# Patient Record
Sex: FEMALE | Race: ASIAN | NOT HISPANIC OR LATINO
[De-identification: names, ages, dates, MRNs, and addresses within clinical notes are randomized per-mention and may not be internally consistent; named-entity substitution may affect disease eponyms.]

---

## 2023-09-14 PROBLEM — Z00.00 ENCOUNTER FOR PREVENTIVE HEALTH EXAMINATION: Status: ACTIVE | Noted: 2023-09-14

## 2023-09-15 ENCOUNTER — APPOINTMENT (OUTPATIENT)
Dept: ORTHOPEDIC SURGERY | Facility: CLINIC | Age: 73
End: 2023-09-15
Payer: MEDICARE

## 2023-09-15 ENCOUNTER — OUTPATIENT (OUTPATIENT)
Dept: OUTPATIENT SERVICES | Facility: HOSPITAL | Age: 73
LOS: 1 days | End: 2023-09-15
Payer: MEDICARE

## 2023-09-15 ENCOUNTER — RESULT REVIEW (OUTPATIENT)
Age: 73
End: 2023-09-15

## 2023-09-15 VITALS
WEIGHT: 125 LBS | SYSTOLIC BLOOD PRESSURE: 126 MMHG | OXYGEN SATURATION: 98 % | BODY MASS INDEX: 23.6 KG/M2 | HEART RATE: 70 BPM | HEIGHT: 61 IN | DIASTOLIC BLOOD PRESSURE: 79 MMHG

## 2023-09-15 DIAGNOSIS — Z78.9 OTHER SPECIFIED HEALTH STATUS: ICD-10-CM

## 2023-09-15 PROCEDURE — 99205 OFFICE O/P NEW HI 60 MIN: CPT

## 2023-09-15 PROCEDURE — 73564 X-RAY EXAM KNEE 4 OR MORE: CPT

## 2023-09-15 PROCEDURE — 73564 X-RAY EXAM KNEE 4 OR MORE: CPT | Mod: 26,50

## 2023-09-19 PROBLEM — Z78.9 NON-SMOKER: Status: ACTIVE | Noted: 2023-09-15

## 2023-12-14 ENCOUNTER — OUTPATIENT (OUTPATIENT)
Dept: OUTPATIENT SERVICES | Facility: HOSPITAL | Age: 73
LOS: 1 days | End: 2023-12-14
Payer: MEDICARE

## 2023-12-14 ENCOUNTER — APPOINTMENT (OUTPATIENT)
Dept: ORTHOPEDIC SURGERY | Facility: CLINIC | Age: 73
End: 2023-12-14
Payer: MEDICARE

## 2023-12-14 ENCOUNTER — RESULT REVIEW (OUTPATIENT)
Age: 73
End: 2023-12-14

## 2023-12-14 DIAGNOSIS — Z01.818 ENCOUNTER FOR OTHER PREPROCEDURAL EXAMINATION: ICD-10-CM

## 2023-12-14 DIAGNOSIS — Z56.0 UNEMPLOYMENT, UNSPECIFIED: ICD-10-CM

## 2023-12-14 DIAGNOSIS — M17.0 BILATERAL PRIMARY OSTEOARTHRITIS OF KNEE: ICD-10-CM

## 2023-12-14 LAB
A1C WITH ESTIMATED AVERAGE GLUCOSE RESULT: 5.7 % — HIGH (ref 4–5.6)
A1C WITH ESTIMATED AVERAGE GLUCOSE RESULT: 5.7 % — HIGH (ref 4–5.6)
ALBUMIN SERPL ELPH-MCNC: 4.1 G/DL — SIGNIFICANT CHANGE UP (ref 3.3–5)
ALBUMIN SERPL ELPH-MCNC: 4.1 G/DL — SIGNIFICANT CHANGE UP (ref 3.3–5)
ALP SERPL-CCNC: 68 U/L — SIGNIFICANT CHANGE UP (ref 40–120)
ALP SERPL-CCNC: 68 U/L — SIGNIFICANT CHANGE UP (ref 40–120)
ALT FLD-CCNC: 15 U/L — SIGNIFICANT CHANGE UP (ref 10–45)
ALT FLD-CCNC: 15 U/L — SIGNIFICANT CHANGE UP (ref 10–45)
ANION GAP SERPL CALC-SCNC: 10 MMOL/L — SIGNIFICANT CHANGE UP (ref 5–17)
ANION GAP SERPL CALC-SCNC: 10 MMOL/L — SIGNIFICANT CHANGE UP (ref 5–17)
APPEARANCE UR: CLEAR — SIGNIFICANT CHANGE UP
APPEARANCE UR: CLEAR — SIGNIFICANT CHANGE UP
APTT BLD: 33.8 SEC — SIGNIFICANT CHANGE UP (ref 24.5–35.6)
APTT BLD: 33.8 SEC — SIGNIFICANT CHANGE UP (ref 24.5–35.6)
AST SERPL-CCNC: 17 U/L — SIGNIFICANT CHANGE UP (ref 10–40)
AST SERPL-CCNC: 17 U/L — SIGNIFICANT CHANGE UP (ref 10–40)
BACTERIA # UR AUTO: NEGATIVE /HPF — SIGNIFICANT CHANGE UP
BACTERIA # UR AUTO: NEGATIVE /HPF — SIGNIFICANT CHANGE UP
BASOPHILS # BLD AUTO: 0.06 K/UL — SIGNIFICANT CHANGE UP (ref 0–0.2)
BASOPHILS # BLD AUTO: 0.06 K/UL — SIGNIFICANT CHANGE UP (ref 0–0.2)
BASOPHILS NFR BLD AUTO: 1 % — SIGNIFICANT CHANGE UP (ref 0–2)
BASOPHILS NFR BLD AUTO: 1 % — SIGNIFICANT CHANGE UP (ref 0–2)
BILIRUB SERPL-MCNC: 0.5 MG/DL — SIGNIFICANT CHANGE UP (ref 0.2–1.2)
BILIRUB SERPL-MCNC: 0.5 MG/DL — SIGNIFICANT CHANGE UP (ref 0.2–1.2)
BILIRUB UR-MCNC: NEGATIVE — SIGNIFICANT CHANGE UP
BILIRUB UR-MCNC: NEGATIVE — SIGNIFICANT CHANGE UP
BUN SERPL-MCNC: 10 MG/DL — SIGNIFICANT CHANGE UP (ref 7–23)
BUN SERPL-MCNC: 10 MG/DL — SIGNIFICANT CHANGE UP (ref 7–23)
CALCIUM SERPL-MCNC: 9.6 MG/DL — SIGNIFICANT CHANGE UP (ref 8.4–10.5)
CALCIUM SERPL-MCNC: 9.6 MG/DL — SIGNIFICANT CHANGE UP (ref 8.4–10.5)
CAST: 0 /LPF — SIGNIFICANT CHANGE UP (ref 0–4)
CAST: 0 /LPF — SIGNIFICANT CHANGE UP (ref 0–4)
CHLORIDE SERPL-SCNC: 107 MMOL/L — SIGNIFICANT CHANGE UP (ref 96–108)
CHLORIDE SERPL-SCNC: 107 MMOL/L — SIGNIFICANT CHANGE UP (ref 96–108)
CO2 SERPL-SCNC: 25 MMOL/L — SIGNIFICANT CHANGE UP (ref 22–31)
CO2 SERPL-SCNC: 25 MMOL/L — SIGNIFICANT CHANGE UP (ref 22–31)
COLOR SPEC: YELLOW — SIGNIFICANT CHANGE UP
COLOR SPEC: YELLOW — SIGNIFICANT CHANGE UP
CREAT SERPL-MCNC: 0.65 MG/DL — SIGNIFICANT CHANGE UP (ref 0.5–1.3)
CREAT SERPL-MCNC: 0.65 MG/DL — SIGNIFICANT CHANGE UP (ref 0.5–1.3)
DIFF PNL FLD: ABNORMAL
DIFF PNL FLD: ABNORMAL
EGFR: 93 ML/MIN/1.73M2 — SIGNIFICANT CHANGE UP
EGFR: 93 ML/MIN/1.73M2 — SIGNIFICANT CHANGE UP
EOSINOPHIL # BLD AUTO: 0.14 K/UL — SIGNIFICANT CHANGE UP (ref 0–0.5)
EOSINOPHIL # BLD AUTO: 0.14 K/UL — SIGNIFICANT CHANGE UP (ref 0–0.5)
EOSINOPHIL NFR BLD AUTO: 2.4 % — SIGNIFICANT CHANGE UP (ref 0–6)
EOSINOPHIL NFR BLD AUTO: 2.4 % — SIGNIFICANT CHANGE UP (ref 0–6)
ESTIMATED AVERAGE GLUCOSE: 117 MG/DL — HIGH (ref 68–114)
ESTIMATED AVERAGE GLUCOSE: 117 MG/DL — HIGH (ref 68–114)
GLUCOSE SERPL-MCNC: 90 MG/DL — SIGNIFICANT CHANGE UP (ref 70–99)
GLUCOSE SERPL-MCNC: 90 MG/DL — SIGNIFICANT CHANGE UP (ref 70–99)
GLUCOSE UR QL: NEGATIVE MG/DL — SIGNIFICANT CHANGE UP
GLUCOSE UR QL: NEGATIVE MG/DL — SIGNIFICANT CHANGE UP
HCT VFR BLD CALC: 43.5 % — SIGNIFICANT CHANGE UP (ref 34.5–45)
HCT VFR BLD CALC: 43.5 % — SIGNIFICANT CHANGE UP (ref 34.5–45)
HGB BLD-MCNC: 14.5 G/DL — SIGNIFICANT CHANGE UP (ref 11.5–15.5)
HGB BLD-MCNC: 14.5 G/DL — SIGNIFICANT CHANGE UP (ref 11.5–15.5)
IMM GRANULOCYTES NFR BLD AUTO: 0.3 % — SIGNIFICANT CHANGE UP (ref 0–0.9)
IMM GRANULOCYTES NFR BLD AUTO: 0.3 % — SIGNIFICANT CHANGE UP (ref 0–0.9)
INR BLD: 0.94 — SIGNIFICANT CHANGE UP (ref 0.85–1.18)
INR BLD: 0.94 — SIGNIFICANT CHANGE UP (ref 0.85–1.18)
KETONES UR-MCNC: NEGATIVE MG/DL — SIGNIFICANT CHANGE UP
KETONES UR-MCNC: NEGATIVE MG/DL — SIGNIFICANT CHANGE UP
LEUKOCYTE ESTERASE UR-ACNC: NEGATIVE — SIGNIFICANT CHANGE UP
LEUKOCYTE ESTERASE UR-ACNC: NEGATIVE — SIGNIFICANT CHANGE UP
LYMPHOCYTES # BLD AUTO: 1.56 K/UL — SIGNIFICANT CHANGE UP (ref 1–3.3)
LYMPHOCYTES # BLD AUTO: 1.56 K/UL — SIGNIFICANT CHANGE UP (ref 1–3.3)
LYMPHOCYTES # BLD AUTO: 27.2 % — SIGNIFICANT CHANGE UP (ref 13–44)
LYMPHOCYTES # BLD AUTO: 27.2 % — SIGNIFICANT CHANGE UP (ref 13–44)
MCHC RBC-ENTMCNC: 31.9 PG — SIGNIFICANT CHANGE UP (ref 27–34)
MCHC RBC-ENTMCNC: 31.9 PG — SIGNIFICANT CHANGE UP (ref 27–34)
MCHC RBC-ENTMCNC: 33.3 GM/DL — SIGNIFICANT CHANGE UP (ref 32–36)
MCHC RBC-ENTMCNC: 33.3 GM/DL — SIGNIFICANT CHANGE UP (ref 32–36)
MCV RBC AUTO: 95.8 FL — SIGNIFICANT CHANGE UP (ref 80–100)
MCV RBC AUTO: 95.8 FL — SIGNIFICANT CHANGE UP (ref 80–100)
MONOCYTES # BLD AUTO: 0.48 K/UL — SIGNIFICANT CHANGE UP (ref 0–0.9)
MONOCYTES # BLD AUTO: 0.48 K/UL — SIGNIFICANT CHANGE UP (ref 0–0.9)
MONOCYTES NFR BLD AUTO: 8.4 % — SIGNIFICANT CHANGE UP (ref 2–14)
MONOCYTES NFR BLD AUTO: 8.4 % — SIGNIFICANT CHANGE UP (ref 2–14)
MRSA PCR RESULT.: NEGATIVE — SIGNIFICANT CHANGE UP
MRSA PCR RESULT.: NEGATIVE — SIGNIFICANT CHANGE UP
MUCOUS THREADS # UR AUTO: PRESENT
MUCOUS THREADS # UR AUTO: PRESENT
NEUTROPHILS # BLD AUTO: 3.48 K/UL — SIGNIFICANT CHANGE UP (ref 1.8–7.4)
NEUTROPHILS # BLD AUTO: 3.48 K/UL — SIGNIFICANT CHANGE UP (ref 1.8–7.4)
NEUTROPHILS NFR BLD AUTO: 60.7 % — SIGNIFICANT CHANGE UP (ref 43–77)
NEUTROPHILS NFR BLD AUTO: 60.7 % — SIGNIFICANT CHANGE UP (ref 43–77)
NITRITE UR-MCNC: NEGATIVE — SIGNIFICANT CHANGE UP
NITRITE UR-MCNC: NEGATIVE — SIGNIFICANT CHANGE UP
NRBC # BLD: 0 /100 WBCS — SIGNIFICANT CHANGE UP (ref 0–0)
NRBC # BLD: 0 /100 WBCS — SIGNIFICANT CHANGE UP (ref 0–0)
PH UR: 6 — SIGNIFICANT CHANGE UP (ref 5–8)
PH UR: 6 — SIGNIFICANT CHANGE UP (ref 5–8)
PLATELET # BLD AUTO: 287 K/UL — SIGNIFICANT CHANGE UP (ref 150–400)
PLATELET # BLD AUTO: 287 K/UL — SIGNIFICANT CHANGE UP (ref 150–400)
POTASSIUM SERPL-MCNC: 4 MMOL/L — SIGNIFICANT CHANGE UP (ref 3.5–5.3)
POTASSIUM SERPL-MCNC: 4 MMOL/L — SIGNIFICANT CHANGE UP (ref 3.5–5.3)
POTASSIUM SERPL-SCNC: 4 MMOL/L — SIGNIFICANT CHANGE UP (ref 3.5–5.3)
POTASSIUM SERPL-SCNC: 4 MMOL/L — SIGNIFICANT CHANGE UP (ref 3.5–5.3)
PROT SERPL-MCNC: 6.7 G/DL — SIGNIFICANT CHANGE UP (ref 6–8.3)
PROT SERPL-MCNC: 6.7 G/DL — SIGNIFICANT CHANGE UP (ref 6–8.3)
PROT UR-MCNC: NEGATIVE MG/DL — SIGNIFICANT CHANGE UP
PROT UR-MCNC: NEGATIVE MG/DL — SIGNIFICANT CHANGE UP
PROTHROM AB SERPL-ACNC: 10.7 SEC — SIGNIFICANT CHANGE UP (ref 9.5–13)
PROTHROM AB SERPL-ACNC: 10.7 SEC — SIGNIFICANT CHANGE UP (ref 9.5–13)
RBC # BLD: 4.54 M/UL — SIGNIFICANT CHANGE UP (ref 3.8–5.2)
RBC # BLD: 4.54 M/UL — SIGNIFICANT CHANGE UP (ref 3.8–5.2)
RBC # FLD: 12 % — SIGNIFICANT CHANGE UP (ref 10.3–14.5)
RBC # FLD: 12 % — SIGNIFICANT CHANGE UP (ref 10.3–14.5)
RBC CASTS # UR COMP ASSIST: 1 /HPF — SIGNIFICANT CHANGE UP (ref 0–4)
RBC CASTS # UR COMP ASSIST: 1 /HPF — SIGNIFICANT CHANGE UP (ref 0–4)
S AUREUS DNA NOSE QL NAA+PROBE: NEGATIVE — SIGNIFICANT CHANGE UP
S AUREUS DNA NOSE QL NAA+PROBE: NEGATIVE — SIGNIFICANT CHANGE UP
SODIUM SERPL-SCNC: 142 MMOL/L — SIGNIFICANT CHANGE UP (ref 135–145)
SODIUM SERPL-SCNC: 142 MMOL/L — SIGNIFICANT CHANGE UP (ref 135–145)
SP GR SPEC: 1.01 — SIGNIFICANT CHANGE UP (ref 1–1.03)
SP GR SPEC: 1.01 — SIGNIFICANT CHANGE UP (ref 1–1.03)
SQUAMOUS # UR AUTO: 1 /HPF — SIGNIFICANT CHANGE UP (ref 0–5)
SQUAMOUS # UR AUTO: 1 /HPF — SIGNIFICANT CHANGE UP (ref 0–5)
UROBILINOGEN FLD QL: 0.2 MG/DL — SIGNIFICANT CHANGE UP (ref 0.2–1)
UROBILINOGEN FLD QL: 0.2 MG/DL — SIGNIFICANT CHANGE UP (ref 0.2–1)
WBC # BLD: 5.74 K/UL — SIGNIFICANT CHANGE UP (ref 3.8–10.5)
WBC # BLD: 5.74 K/UL — SIGNIFICANT CHANGE UP (ref 3.8–10.5)
WBC # FLD AUTO: 5.74 K/UL — SIGNIFICANT CHANGE UP (ref 3.8–10.5)
WBC # FLD AUTO: 5.74 K/UL — SIGNIFICANT CHANGE UP (ref 3.8–10.5)
WBC UR QL: 0 /HPF — SIGNIFICANT CHANGE UP (ref 0–5)
WBC UR QL: 0 /HPF — SIGNIFICANT CHANGE UP (ref 0–5)

## 2023-12-14 PROCEDURE — 71046 X-RAY EXAM CHEST 2 VIEWS: CPT | Mod: 26

## 2023-12-14 PROCEDURE — 80053 COMPREHEN METABOLIC PANEL: CPT

## 2023-12-14 PROCEDURE — 87086 URINE CULTURE/COLONY COUNT: CPT

## 2023-12-14 PROCEDURE — 85610 PROTHROMBIN TIME: CPT

## 2023-12-14 PROCEDURE — 87640 STAPH A DNA AMP PROBE: CPT

## 2023-12-14 PROCEDURE — 85025 COMPLETE CBC W/AUTO DIFF WBC: CPT

## 2023-12-14 PROCEDURE — 85730 THROMBOPLASTIN TIME PARTIAL: CPT

## 2023-12-14 PROCEDURE — 83036 HEMOGLOBIN GLYCOSYLATED A1C: CPT

## 2023-12-14 PROCEDURE — 71046 X-RAY EXAM CHEST 2 VIEWS: CPT

## 2023-12-14 PROCEDURE — 99214 OFFICE O/P EST MOD 30 MIN: CPT

## 2023-12-14 PROCEDURE — 93005 ELECTROCARDIOGRAM TRACING: CPT

## 2023-12-14 PROCEDURE — 87641 MR-STAPH DNA AMP PROBE: CPT

## 2023-12-14 PROCEDURE — 93010 ELECTROCARDIOGRAM REPORT: CPT | Mod: NC

## 2023-12-14 PROCEDURE — 81001 URINALYSIS AUTO W/SCOPE: CPT

## 2023-12-14 SDOH — ECONOMIC STABILITY - INCOME SECURITY: UNEMPLOYMENT, UNSPECIFIED: Z56.0

## 2023-12-15 PROBLEM — Z56.0 UNEMPLOYED: Status: ACTIVE | Noted: 2023-12-15

## 2023-12-15 LAB
CULTURE RESULTS: SIGNIFICANT CHANGE UP
CULTURE RESULTS: SIGNIFICANT CHANGE UP
SPECIMEN SOURCE: SIGNIFICANT CHANGE UP
SPECIMEN SOURCE: SIGNIFICANT CHANGE UP

## 2023-12-15 RX ORDER — CELECOXIB 100 MG/1
100 CAPSULE ORAL
Refills: 0 | Status: ACTIVE | COMMUNITY

## 2023-12-15 RX ORDER — VIT C/VIT E/LUTEIN/MINERALS 1 60-30-6
CAPSULE ORAL
Refills: 0 | Status: ACTIVE | COMMUNITY

## 2023-12-26 NOTE — ADDENDUM
[FreeTextEntry1] : Labs (12/14/23): CBC, CMP, Coags wnl A1c 5.7% MSSA/MRSA negative UCx (12/15/23): insignificant amount of mixed growth  ECG (12/14/23): Sinus bradycardia 53 bpm  CXR (12/14/23): 2 cm circular opacity projecting over the left lower lung zone in the PA view possibly within the lingula and the lateral view. Further characterization to include a CT chest is suggested to exclude a lung carcinoma.  Based on the above, patient is medically optimized for surgery.

## 2023-12-26 NOTE — HISTORY OF PRESENT ILLNESS
[No Pertinent Cardiac History] : no history of aortic stenosis, atrial fibrillation, coronary artery disease, recent myocardial infarction, or implantable device/pacemaker [No Pertinent Pulmonary History] : no history of asthma, COPD, sleep apnea, or smoking [No Adverse Anesthesia Reaction] : no adverse anesthesia reaction in self or family member [(Patient denies any chest pain, claudication, dyspnea on exertion, orthopnea, palpitations or syncope)] : Patient denies any chest pain, claudication, dyspnea on exertion, orthopnea, palpitations or syncope [Moderate (4-6 METs)] : Moderate (4-6 METs) [NSAIDs: _____] : NSAIDs: [unfilled] [Herbal Supplements: _____] : Herbal Supplements: [unfilled] [Chronic Anticoagulation] : no chronic anticoagulation [Chronic Kidney Disease] : no chronic kidney disease [Diabetes] : no diabetes [FreeTextEntry1] : Left total knee arthroplasty [FreeTextEntry2] : 1/4/2024 [FreeTextEntry3] : Dr. Gurjit Sanderson [FreeTextEntry4] : MELANIE VILLAFANA is a 73 year old female who presents for preprocedural evaluation.  Hx vision impairment left eye 2/2 macular degeneration, glaucoma, retinal detachment.    [FreeTextEntry8] : Physical activity limited due to pain.

## 2023-12-26 NOTE — PHYSICAL EXAM
[No Acute Distress] : no acute distress [Well Nourished] : well nourished [Well Developed] : well developed [Well-Appearing] : well-appearing [Normal Sclera/Conjunctiva] : normal sclera/conjunctiva [PERRL] : pupils equal round and reactive to light [EOMI] : extraocular movements intact [Normal Outer Ear/Nose] : the outer ears and nose were normal in appearance [Normal Oropharynx] : the oropharynx was normal [No JVD] : no jugular venous distention [No Lymphadenopathy] : no lymphadenopathy [Supple] : supple [Thyroid Normal, No Nodules] : the thyroid was normal and there were no nodules present [No Respiratory Distress] : no respiratory distress  [No Accessory Muscle Use] : no accessory muscle use [Clear to Auscultation] : lungs were clear to auscultation bilaterally [Normal Rate] : normal rate  [Regular Rhythm] : with a regular rhythm [Normal S1, S2] : normal S1 and S2 [No Murmur] : no murmur heard [No Carotid Bruits] : no carotid bruits [No Abdominal Bruit] : a ~M bruit was not heard ~T in the abdomen [No Varicosities] : no varicosities [Pedal Pulses Present] : the pedal pulses are present [No Edema] : there was no peripheral edema [No Palpable Aorta] : no palpable aorta [No Extremity Clubbing/Cyanosis] : no extremity clubbing/cyanosis [Soft] : abdomen soft [Non Tender] : non-tender [Non-distended] : non-distended [No Masses] : no abdominal mass palpated [No HSM] : no HSM [Normal Bowel Sounds] : normal bowel sounds [Normal Posterior Cervical Nodes] : no posterior cervical lymphadenopathy [Normal Anterior Cervical Nodes] : no anterior cervical lymphadenopathy [No CVA Tenderness] : no CVA  tenderness [No Spinal Tenderness] : no spinal tenderness [No Rash] : no rash [Coordination Grossly Intact] : coordination grossly intact [No Focal Deficits] : no focal deficits [Normal Gait] : normal gait [Deep Tendon Reflexes (DTR)] : deep tendon reflexes were 2+ and symmetric [Normal Affect] : the affect was normal [Normal Insight/Judgement] : insight and judgment were intact [de-identified] : Ambulates with cane. Left knee limited ROM due to pain. Tender to palpation medial joint line.

## 2023-12-26 NOTE — REVIEW OF SYSTEMS
[Vision Problems] : vision problems [Joint Pain] : joint pain [Joint Stiffness] : joint stiffness [Negative] : Heme/Lymph [Discharge] : no discharge [Pain] : no pain [Redness] : no redness [Itching] : no itching [FreeTextEntry3] : Visual impairment left eye

## 2023-12-26 NOTE — INTERPRETER SERVICES
[Interpreters_FullName] : Donita Dawson [Interpreters_Relationshiptopatient] : Daughter [TWNoteComboBox1] : Hiram

## 2023-12-26 NOTE — ASSESSMENT
[FreeTextEntry4] : MELANIE VILLAFANA is a 73 year old female presents for preoperative medical evaluation:  Milton score 0 RCRI class I - 3.9% 30-day risk of death, MI or cardiac arrest METs >4 CXR (12/14/23): No evidence of acute cardiopulmonary disease.  Plan:  Labs, ECG Avoid Intra-/lakesha-operative hypotension. Prophylactic antibiotics recommended.  Stop NSAIDs, vitamins, supplements and herbal therapies at least 5 days prior to procedure.  DVT prophylaxis and early mobilization is recommended. Early use of incentive spirometry recommended. All questions answered and patient verbalized understanding.

## 2024-01-04 ENCOUNTER — APPOINTMENT (OUTPATIENT)
Dept: ORTHOPEDIC SURGERY | Facility: HOSPITAL | Age: 74
End: 2024-01-04

## 2024-01-04 NOTE — H&P ADULT - PROBLEM SELECTOR PLAN 1
Admit to Orthopaedic Service.  Presents today for elective left total knee replacement  Pt medically stable and cleared for procedure today by  ** Admit to Orthopaedic Service.  Presents today for elective left total knee replacement  Pt medically stable and cleared for procedure today by Dr. Farley

## 2024-01-04 NOTE — H&P ADULT - NSHPLABSRESULTS_GEN_ALL_CORE
Preop CBC, BMP, PT/INR, PTT within normal range and reviewed per medical clearance  H/H: 14.5/43.5  Cr: 0.65  A1c: 5.7  UA: negative, + small blood  Preop CXR showing 2cm circular opacity projecting over left lower lung- reviewed per medical clearance  Preop EKG- HR 53bpm, sinus bradycardia and reviewed per medical clearance  3M: DOS Preop CBC, BMP, PT/INR, PTT within normal range and reviewed per medical clearance  H/H: 14.5/43.5  Cr: 0.65  A1c: 5.7  UA: negative, + small blood  Preop CXR showing 2cm circular opacity projecting over left lower lung- reviewed per medical clearance  Preop EKG- HR 53bpm, sinus bradycardia and reviewed per medical clearance  3M: DOS  MSSA positive  MRSA negative

## 2024-01-04 NOTE — H&P ADULT - NSHPPHYSICALEXAM_GEN_ALL_CORE
MSK: Decreased left knee ROM secondary to pain     Remained of PE per medical clearance Gen: 73F NAD  MSK: Decreased left knee ROM secondary to pain  Skin without erythema, ecchymosis, abrasions or lesions,   Calves soft, nontender bilaterally   Sensation intact to light touch bilateral lower extremities  Pulses: +2 DP palpabe, brisk capillary refill  EHL/FHL/TA/GS 5/5 bilaterally LE    Rest of PE per MD clearance

## 2024-01-04 NOTE — H&P ADULT - HISTORY OF PRESENT ILLNESS
73yoF with left knee pain x     Presents today for elective left total knee replacement with Dr. Sanderson. Daughter bedside to translate.     73yoF with left knee pain x 10 years. She said that the pain began spontaneously and without accident or trauma. Pt says her pain has progressively worsened without improvement and has become increasingly unresponsive to conservative management. Pt ambulates with a cane at home. Denies numbness/tingling of extremities, She is able to ambulate 2 block with a cane.     Denies any history of blood clots or seizure. Denies chest pain, shortness of breath, nausea or vomiting today.     Presents today for elective left total knee replacement with Dr. Sanderson.

## 2024-01-05 VITALS
SYSTOLIC BLOOD PRESSURE: 115 MMHG | WEIGHT: 122.58 LBS | HEART RATE: 81 BPM | HEIGHT: 61 IN | TEMPERATURE: 97 F | DIASTOLIC BLOOD PRESSURE: 77 MMHG | OXYGEN SATURATION: 98 % | RESPIRATION RATE: 18 BRPM

## 2024-01-05 RX ORDER — OXYCODONE 5 MG/1
5 TABLET ORAL
Qty: 50 | Refills: 0 | Status: ACTIVE | COMMUNITY
Start: 2024-01-05 | End: 1900-01-01

## 2024-01-05 RX ORDER — MORPHINE SULFATE 15 MG/1
15 TABLET, FILM COATED, EXTENDED RELEASE ORAL
Qty: 14 | Refills: 0 | Status: ACTIVE | COMMUNITY
Start: 2024-01-05 | End: 1900-01-01

## 2024-01-05 RX ORDER — POVIDONE-IODINE 5 %
1 AEROSOL (ML) TOPICAL ONCE
Refills: 0 | Status: COMPLETED | OUTPATIENT
Start: 2024-01-08 | End: 2024-01-08

## 2024-01-05 RX ORDER — ACETAMINOPHEN 500 MG/1
500 TABLET ORAL
Qty: 180 | Refills: 1 | Status: ACTIVE | COMMUNITY
Start: 2024-01-05 | End: 1900-01-01

## 2024-01-05 RX ORDER — PANTOPRAZOLE 40 MG/1
40 TABLET, DELAYED RELEASE ORAL
Qty: 30 | Refills: 2 | Status: ACTIVE | COMMUNITY
Start: 2024-01-05 | End: 1900-01-01

## 2024-01-05 RX ORDER — CELECOXIB 200 MG/1
200 CAPSULE ORAL TWICE DAILY
Qty: 60 | Refills: 2 | Status: ACTIVE | COMMUNITY
Start: 2024-01-05 | End: 1900-01-01

## 2024-01-05 NOTE — ASU PATIENT PROFILE, ADULT - FALL HARM RISK - RISK INTERVENTIONS
Assistance OOB with selected safe patient handling equipment/Communicate Fall Risk and Risk Factors to all staff, patient, and family/Discuss with provider need for PT consult/Monitor gait and stability/Provide patient with walking aids - walker, cane, crutches/Reinforce activity limits and safety measures with patient and family/Visual Cue: Yellow wristband/Bed in lowest position, wheels locked, appropriate side rails in place/Call bell, personal items and telephone in reach/Instruct patient to call for assistance before getting out of bed or chair/Non-slip footwear when patient is out of bed/[place] to call system/Physically safe environment - no spills, clutter or unnecessary equipment/Purposeful Proactive Rounding/Room/bathroom lighting operational, light cord in reach Assistance OOB with selected safe patient handling equipment/Communicate Fall Risk and Risk Factors to all staff, patient, and family/Discuss with provider need for PT consult/Monitor gait and stability/Provide patient with walking aids - walker, cane, crutches/Reinforce activity limits and safety measures with patient and family/Visual Cue: Yellow wristband/Bed in lowest position, wheels locked, appropriate side rails in place/Call bell, personal items and telephone in reach/Instruct patient to call for assistance before getting out of bed or chair/Non-slip footwear when patient is out of bed/[place] to call system/Physically safe environment - no spills, clutter or unnecessary equipment/Purposeful Proactive Rounding/Room/bathroom lighting operational, light cord in reach

## 2024-01-05 NOTE — ASU PATIENT PROFILE, ADULT - MENTAL HEALTH CONDITIONS/SYMPTOMS, PROFILE
LOV 1/4/23 with SD for CPE. Pended Mammogram.  OK to order? Last Mammogram 10/22/22 for Bilateral 2D 3D Mammogram.  Copied results:    Impression   CONCLUSION:       BI-RADS CATEGORY:    DIAGNOSTIC CATEGORY 1--NEGATIVE ASSESSMENT. RECOMMENDATIONS:    ROUTINE MAMMOGRAM AND CLINICAL EVALUATION IN 12 MONTHS. none

## 2024-01-05 NOTE — ASU PREOP CHECKLIST - WAS PATIENT ON BETA BLOCKER?
Chief Complaint Patient presents with  Hypertension 3 month follow up 1. Have you been to the ER, urgent care clinic since your last visit? Hospitalized since your last visit? No 
 
2. Have you seen or consulted any other health care providers outside of the [address] [place] [address] since your last visit? Include any pap smears or colon screening. No  
 
Fasting No

## 2024-01-07 ENCOUNTER — TRANSCRIPTION ENCOUNTER (OUTPATIENT)
Age: 74
End: 2024-01-07

## 2024-01-08 ENCOUNTER — INPATIENT (INPATIENT)
Facility: HOSPITAL | Age: 74
LOS: 0 days | Discharge: HOME HEALTH | DRG: 470 | End: 2024-01-09
Attending: ORTHOPAEDIC SURGERY | Admitting: ORTHOPAEDIC SURGERY
Payer: MEDICARE

## 2024-01-08 ENCOUNTER — APPOINTMENT (OUTPATIENT)
Dept: ORTHOPEDIC SURGERY | Facility: HOSPITAL | Age: 74
End: 2024-01-08

## 2024-01-08 DIAGNOSIS — Z98.890 OTHER SPECIFIED POSTPROCEDURAL STATES: Chronic | ICD-10-CM

## 2024-01-08 DIAGNOSIS — M17.12 UNILATERAL PRIMARY OSTEOARTHRITIS, LEFT KNEE: ICD-10-CM

## 2024-01-08 PROCEDURE — 73560 X-RAY EXAM OF KNEE 1 OR 2: CPT | Mod: 26,LT

## 2024-01-08 PROCEDURE — 27447 TOTAL KNEE ARTHROPLASTY: CPT | Mod: LT

## 2024-01-08 PROCEDURE — S2900 ROBOTIC SURGICAL SYSTEM: CPT | Mod: NC

## 2024-01-08 DEVICE — CELLERATE SURGICAL POWDER RX 5GM: Type: IMPLANTABLE DEVICE | Site: LEFT | Status: FUNCTIONAL

## 2024-01-08 DEVICE — PIN CAS FIX 3.2X150MM: Type: IMPLANTABLE DEVICE | Site: LEFT | Status: FUNCTIONAL

## 2024-01-08 DEVICE — CEMENT PALACOS R: Type: IMPLANTABLE DEVICE | Site: LEFT | Status: FUNCTIONAL

## 2024-01-08 DEVICE — IMP KNEE PERSONA ASF PS 10MM: Type: IMPLANTABLE DEVICE | Site: LEFT | Status: FUNCTIONAL

## 2024-01-08 DEVICE — ZIMMER/NEXGEN SMOOTH PIN 3.2X75MM: Type: IMPLANTABLE DEVICE | Site: LEFT | Status: FUNCTIONAL

## 2024-01-08 DEVICE — ZIMMER/NEXGEN HEX HEAD SCREW 3.5MM: Type: IMPLANTABLE DEVICE | Site: LEFT | Status: FUNCTIONAL

## 2024-01-08 DEVICE — STEM PSN TIB CMT SZ CL 5 DEG: Type: IMPLANTABLE DEVICE | Site: LEFT | Status: FUNCTIONAL

## 2024-01-08 DEVICE — ZIMMER FEMALE HEX SCREW MAGNETIC 2.5MM X 25MM: Type: IMPLANTABLE DEVICE | Site: LEFT | Status: FUNCTIONAL

## 2024-01-08 DEVICE — PIN FIX CAS 3.2X80MM STR: Type: IMPLANTABLE DEVICE | Site: LEFT | Status: FUNCTIONAL

## 2024-01-08 DEVICE — FEM PERSONA PS CMT CCR STD SZ6 L: Type: IMPLANTABLE DEVICE | Site: LEFT | Status: FUNCTIONAL

## 2024-01-08 DEVICE — STEM EXT PERSONA 14MM PLUS 30M: Type: IMPLANTABLE DEVICE | Site: LEFT | Status: FUNCTIONAL

## 2024-01-08 RX ORDER — OXYCODONE HYDROCHLORIDE 5 MG/1
10 TABLET ORAL EVERY 6 HOURS
Refills: 0 | Status: DISCONTINUED | OUTPATIENT
Start: 2024-01-08 | End: 2024-01-09

## 2024-01-08 RX ORDER — SODIUM CHLORIDE 9 MG/ML
1000 INJECTION, SOLUTION INTRAVENOUS
Refills: 0 | Status: DISCONTINUED | OUTPATIENT
Start: 2024-01-08 | End: 2024-01-09

## 2024-01-08 RX ORDER — CEFAZOLIN SODIUM 1 G
2000 VIAL (EA) INJECTION EVERY 8 HOURS
Refills: 0 | Status: COMPLETED | OUTPATIENT
Start: 2024-01-08 | End: 2024-01-09

## 2024-01-08 RX ORDER — POLYETHYLENE GLYCOL 3350 17 G/17G
17 POWDER, FOR SOLUTION ORAL AT BEDTIME
Refills: 0 | Status: DISCONTINUED | OUTPATIENT
Start: 2024-01-08 | End: 2024-01-09

## 2024-01-08 RX ORDER — ONDANSETRON 8 MG/1
4 TABLET, FILM COATED ORAL EVERY 6 HOURS
Refills: 0 | Status: DISCONTINUED | OUTPATIENT
Start: 2024-01-08 | End: 2024-01-09

## 2024-01-08 RX ORDER — ASPIRIN/CALCIUM CARB/MAGNESIUM 324 MG
81 TABLET ORAL
Refills: 0 | Status: DISCONTINUED | OUTPATIENT
Start: 2024-01-09 | End: 2024-01-09

## 2024-01-08 RX ORDER — APREPITANT 80 MG/1
40 CAPSULE ORAL ONCE
Refills: 0 | Status: COMPLETED | OUTPATIENT
Start: 2024-01-08 | End: 2024-01-08

## 2024-01-08 RX ORDER — HYDROMORPHONE HYDROCHLORIDE 2 MG/ML
0.5 INJECTION INTRAMUSCULAR; INTRAVENOUS; SUBCUTANEOUS
Refills: 0 | Status: DISCONTINUED | OUTPATIENT
Start: 2024-01-08 | End: 2024-01-09

## 2024-01-08 RX ORDER — SENNA PLUS 8.6 MG/1
2 TABLET ORAL AT BEDTIME
Refills: 0 | Status: DISCONTINUED | OUTPATIENT
Start: 2024-01-08 | End: 2024-01-09

## 2024-01-08 RX ORDER — CHLORHEXIDINE GLUCONATE 213 G/1000ML
1 SOLUTION TOPICAL ONCE
Refills: 0 | Status: COMPLETED | OUTPATIENT
Start: 2024-01-08 | End: 2024-01-08

## 2024-01-08 RX ORDER — MAGNESIUM HYDROXIDE 400 MG/1
30 TABLET, CHEWABLE ORAL DAILY
Refills: 0 | Status: DISCONTINUED | OUTPATIENT
Start: 2024-01-08 | End: 2024-01-09

## 2024-01-08 RX ORDER — OXYCODONE HYDROCHLORIDE 5 MG/1
5 TABLET ORAL EVERY 4 HOURS
Refills: 0 | Status: DISCONTINUED | OUTPATIENT
Start: 2024-01-08 | End: 2024-01-09

## 2024-01-08 RX ORDER — ACETAMINOPHEN 500 MG
1000 TABLET ORAL EVERY 8 HOURS
Refills: 0 | Status: DISCONTINUED | OUTPATIENT
Start: 2024-01-08 | End: 2024-01-09

## 2024-01-08 RX ORDER — ACETAMINOPHEN 500 MG
1000 TABLET ORAL ONCE
Refills: 0 | Status: COMPLETED | OUTPATIENT
Start: 2024-01-08 | End: 2024-01-08

## 2024-01-08 RX ORDER — CELECOXIB 200 MG/1
200 CAPSULE ORAL EVERY 12 HOURS
Refills: 0 | Status: DISCONTINUED | OUTPATIENT
Start: 2024-01-09 | End: 2024-01-09

## 2024-01-08 RX ADMIN — Medication 1000 MILLIGRAM(S): at 09:23

## 2024-01-08 RX ADMIN — Medication 1 APPLICATION(S): at 09:24

## 2024-01-08 RX ADMIN — APREPITANT 40 MILLIGRAM(S): 80 CAPSULE ORAL at 09:24

## 2024-01-08 RX ADMIN — CHLORHEXIDINE GLUCONATE 1 APPLICATION(S): 213 SOLUTION TOPICAL at 09:24

## 2024-01-08 RX ADMIN — Medication 100 MILLIGRAM(S): at 19:24

## 2024-01-08 RX ADMIN — Medication 1000 MILLIGRAM(S): at 19:24

## 2024-01-08 NOTE — BRIEF OPERATIVE NOTE - ASSISTANT(S)
Dr. Hinkle , Fellow[facility]

## 2024-01-08 NOTE — PHYSICAL THERAPY INITIAL EVALUATION ADULT - PERTINENT HX OF CURRENT PROBLEM, REHAB EVAL
Patient is 73 year old female with left knee pain x 10 years. She said that the pain began spontaneously and without accident or trauma. Pt says her pain has progressively worsened without improvement and has become increasingly unresponsive to conservative management.

## 2024-01-08 NOTE — PHYSICAL THERAPY INITIAL EVALUATION ADULT - GENERAL OBSERVATIONS, REHAB EVAL
MO Kenney aware of intent to treat. Patient received semi-supine in NAD with +heplock +L knee dressing clean/dry/intact +cryocuff +SCD x 2

## 2024-01-08 NOTE — PROGRESS NOTE ADULT - SUBJECTIVE AND OBJECTIVE BOX
Orthopedics Post Op Check    Procedure: left TKA   Surgeon: Dr Sanderson    Pt comfortable, without complaints. Pain well controlled in PACU; pt visiting with daughter.   Denies CP, SOB, N/V, numbness/tingling BLE     Vital Signs Last 24 Hrs  T(C): 36.7 (08 Jan 2024 15:26), Max: 36.7 (08 Jan 2024 15:26)  T(F): 98 (08 Jan 2024 15:26), Max: 98 (08 Jan 2024 15:26)  HR: 82 (08 Jan 2024 15:26) (78 - 84)  BP: 111/63 (08 Jan 2024 15:26) (103/57 - 115/77)  BP(mean): 82 (08 Jan 2024 15:26) (74 - 82)  RR: 16 (08 Jan 2024 15:26) (13 - 20)  SpO2: 95% (08 Jan 2024 15:26) (93% - 98%)    Parameters below as of 08 Jan 2024 15:26  Patient On (Oxygen Delivery Method): room air    AVSS, NAD  General: Pt Alert and oriented, comfortable  Dressing C/D/I ace , cryo cuff in place   Sensation intact and equal BLE   Motor ehl/fhl/ta/gs 5/5 BLE   Pulses dp palpable BLE, cap refill brisk, toes warm and well perfused     Post op XR: s/p left TKA, hardware in place     A/P: 73yFemale POD#0 s/p left TKA   - Stable, monitor labs and VS   - Pain Control  - DVT ppx: SCDs, ASA 81 BID   - Yesika-op abx: Ancef   - PT, WBS: WBAT   - F/U AM Labs

## 2024-01-08 NOTE — PHYSICAL THERAPY INITIAL EVALUATION ADULT - ADDITIONAL COMMENTS
Patient reports she lives with her  in elevator apartment with no CHANCE. PTA patient ambulated with rollator and was independent with ADLs. Patient required assistance for IADLs from A 6 days 5hrs.

## 2024-01-09 ENCOUNTER — TRANSCRIPTION ENCOUNTER (OUTPATIENT)
Age: 74
End: 2024-01-09

## 2024-01-09 VITALS
RESPIRATION RATE: 16 BRPM | DIASTOLIC BLOOD PRESSURE: 55 MMHG | HEART RATE: 74 BPM | SYSTOLIC BLOOD PRESSURE: 101 MMHG | TEMPERATURE: 98 F | OXYGEN SATURATION: 96 %

## 2024-01-09 LAB
ANION GAP SERPL CALC-SCNC: 8 MMOL/L — SIGNIFICANT CHANGE UP (ref 5–17)
ANION GAP SERPL CALC-SCNC: 8 MMOL/L — SIGNIFICANT CHANGE UP (ref 5–17)
BUN SERPL-MCNC: 12 MG/DL — SIGNIFICANT CHANGE UP (ref 7–23)
BUN SERPL-MCNC: 12 MG/DL — SIGNIFICANT CHANGE UP (ref 7–23)
CALCIUM SERPL-MCNC: 8.5 MG/DL — SIGNIFICANT CHANGE UP (ref 8.4–10.5)
CALCIUM SERPL-MCNC: 8.5 MG/DL — SIGNIFICANT CHANGE UP (ref 8.4–10.5)
CHLORIDE SERPL-SCNC: 108 MMOL/L — SIGNIFICANT CHANGE UP (ref 96–108)
CHLORIDE SERPL-SCNC: 108 MMOL/L — SIGNIFICANT CHANGE UP (ref 96–108)
CO2 SERPL-SCNC: 24 MMOL/L — SIGNIFICANT CHANGE UP (ref 22–31)
CO2 SERPL-SCNC: 24 MMOL/L — SIGNIFICANT CHANGE UP (ref 22–31)
CREAT SERPL-MCNC: 0.58 MG/DL — SIGNIFICANT CHANGE UP (ref 0.5–1.3)
CREAT SERPL-MCNC: 0.58 MG/DL — SIGNIFICANT CHANGE UP (ref 0.5–1.3)
EGFR: 96 ML/MIN/1.73M2 — SIGNIFICANT CHANGE UP
EGFR: 96 ML/MIN/1.73M2 — SIGNIFICANT CHANGE UP
GLUCOSE SERPL-MCNC: 124 MG/DL — HIGH (ref 70–99)
GLUCOSE SERPL-MCNC: 124 MG/DL — HIGH (ref 70–99)
HCT VFR BLD CALC: 37.7 % — SIGNIFICANT CHANGE UP (ref 34.5–45)
HCT VFR BLD CALC: 37.7 % — SIGNIFICANT CHANGE UP (ref 34.5–45)
HCV AB S/CO SERPL IA: 0.04 S/CO — SIGNIFICANT CHANGE UP
HCV AB S/CO SERPL IA: 0.04 S/CO — SIGNIFICANT CHANGE UP
HCV AB SERPL-IMP: SIGNIFICANT CHANGE UP
HCV AB SERPL-IMP: SIGNIFICANT CHANGE UP
HGB BLD-MCNC: 12.4 G/DL — SIGNIFICANT CHANGE UP (ref 11.5–15.5)
HGB BLD-MCNC: 12.4 G/DL — SIGNIFICANT CHANGE UP (ref 11.5–15.5)
MCHC RBC-ENTMCNC: 31.2 PG — SIGNIFICANT CHANGE UP (ref 27–34)
MCHC RBC-ENTMCNC: 31.2 PG — SIGNIFICANT CHANGE UP (ref 27–34)
MCHC RBC-ENTMCNC: 32.9 GM/DL — SIGNIFICANT CHANGE UP (ref 32–36)
MCHC RBC-ENTMCNC: 32.9 GM/DL — SIGNIFICANT CHANGE UP (ref 32–36)
MCV RBC AUTO: 94.7 FL — SIGNIFICANT CHANGE UP (ref 80–100)
MCV RBC AUTO: 94.7 FL — SIGNIFICANT CHANGE UP (ref 80–100)
NRBC # BLD: 0 /100 WBCS — SIGNIFICANT CHANGE UP (ref 0–0)
NRBC # BLD: 0 /100 WBCS — SIGNIFICANT CHANGE UP (ref 0–0)
PLATELET # BLD AUTO: 295 K/UL — SIGNIFICANT CHANGE UP (ref 150–400)
PLATELET # BLD AUTO: 295 K/UL — SIGNIFICANT CHANGE UP (ref 150–400)
POTASSIUM SERPL-MCNC: 4 MMOL/L — SIGNIFICANT CHANGE UP (ref 3.5–5.3)
POTASSIUM SERPL-MCNC: 4 MMOL/L — SIGNIFICANT CHANGE UP (ref 3.5–5.3)
POTASSIUM SERPL-SCNC: 4 MMOL/L — SIGNIFICANT CHANGE UP (ref 3.5–5.3)
POTASSIUM SERPL-SCNC: 4 MMOL/L — SIGNIFICANT CHANGE UP (ref 3.5–5.3)
RBC # BLD: 3.98 M/UL — SIGNIFICANT CHANGE UP (ref 3.8–5.2)
RBC # BLD: 3.98 M/UL — SIGNIFICANT CHANGE UP (ref 3.8–5.2)
RBC # FLD: 12 % — SIGNIFICANT CHANGE UP (ref 10.3–14.5)
RBC # FLD: 12 % — SIGNIFICANT CHANGE UP (ref 10.3–14.5)
SODIUM SERPL-SCNC: 140 MMOL/L — SIGNIFICANT CHANGE UP (ref 135–145)
SODIUM SERPL-SCNC: 140 MMOL/L — SIGNIFICANT CHANGE UP (ref 135–145)
WBC # BLD: 15.27 K/UL — HIGH (ref 3.8–10.5)
WBC # BLD: 15.27 K/UL — HIGH (ref 3.8–10.5)
WBC # FLD AUTO: 15.27 K/UL — HIGH (ref 3.8–10.5)
WBC # FLD AUTO: 15.27 K/UL — HIGH (ref 3.8–10.5)

## 2024-01-09 PROCEDURE — 86803 HEPATITIS C AB TEST: CPT

## 2024-01-09 PROCEDURE — 85027 COMPLETE CBC AUTOMATED: CPT

## 2024-01-09 PROCEDURE — 80048 BASIC METABOLIC PNL TOTAL CA: CPT

## 2024-01-09 PROCEDURE — 97161 PT EVAL LOW COMPLEX 20 MIN: CPT

## 2024-01-09 PROCEDURE — 36415 COLL VENOUS BLD VENIPUNCTURE: CPT

## 2024-01-09 PROCEDURE — S2900: CPT

## 2024-01-09 PROCEDURE — C1889: CPT

## 2024-01-09 PROCEDURE — C1776: CPT

## 2024-01-09 PROCEDURE — 73560 X-RAY EXAM OF KNEE 1 OR 2: CPT

## 2024-01-09 PROCEDURE — 99222 1ST HOSP IP/OBS MODERATE 55: CPT

## 2024-01-09 PROCEDURE — C1713: CPT

## 2024-01-09 PROCEDURE — 97116 GAIT TRAINING THERAPY: CPT

## 2024-01-09 RX ORDER — MORPHINE SULFATE 50 MG/1
1 CAPSULE, EXTENDED RELEASE ORAL
Qty: 0 | Refills: 0 | DISCHARGE

## 2024-01-09 RX ORDER — CELECOXIB 200 MG/1
1 CAPSULE ORAL
Refills: 0 | DISCHARGE

## 2024-01-09 RX ORDER — PANTOPRAZOLE SODIUM 20 MG/1
40 TABLET, DELAYED RELEASE ORAL
Refills: 0 | Status: DISCONTINUED | OUTPATIENT
Start: 2024-01-09 | End: 2024-01-09

## 2024-01-09 RX ORDER — PANTOPRAZOLE SODIUM 20 MG/1
1 TABLET, DELAYED RELEASE ORAL
Qty: 0 | Refills: 0 | DISCHARGE
Start: 2024-01-09

## 2024-01-09 RX ORDER — ACETAMINOPHEN 500 MG
2 TABLET ORAL
Qty: 0 | Refills: 0 | DISCHARGE
Start: 2024-01-09

## 2024-01-09 RX ORDER — ASPIRIN/CALCIUM CARB/MAGNESIUM 324 MG
1 TABLET ORAL
Qty: 60 | Refills: 0
Start: 2024-01-09 | End: 2024-02-07

## 2024-01-09 RX ORDER — OXYCODONE HYDROCHLORIDE 5 MG/1
1 TABLET ORAL
Qty: 0 | Refills: 0 | DISCHARGE
Start: 2024-01-09

## 2024-01-09 RX ORDER — CELECOXIB 200 MG/1
1 CAPSULE ORAL
Qty: 0 | Refills: 0 | DISCHARGE
Start: 2024-01-09

## 2024-01-09 RX ADMIN — CELECOXIB 200 MILLIGRAM(S): 200 CAPSULE ORAL at 06:25

## 2024-01-09 RX ADMIN — Medication 1000 MILLIGRAM(S): at 11:36

## 2024-01-09 RX ADMIN — Medication 100 MILLIGRAM(S): at 02:00

## 2024-01-09 RX ADMIN — CELECOXIB 200 MILLIGRAM(S): 200 CAPSULE ORAL at 05:25

## 2024-01-09 RX ADMIN — Medication 1000 MILLIGRAM(S): at 02:32

## 2024-01-09 RX ADMIN — Medication 1000 MILLIGRAM(S): at 01:32

## 2024-01-09 RX ADMIN — Medication 1 TABLET(S): at 11:38

## 2024-01-09 RX ADMIN — Medication 81 MILLIGRAM(S): at 05:26

## 2024-01-09 NOTE — DISCHARGE NOTE NURSING/CASE MANAGEMENT/SOCIAL WORK - NSDCPEFALRISK_GEN_ALL_CORE
For information on Fall & Injury Prevention, visit: [email or web address] OR  [email or web address] OR  https://www.cdc.gov/steadi/patient.html For information on Fall & Injury Prevention, visit: [email or web address] OR  [email or web address] OR  https://www.cdc.gov/steadi/patient.html

## 2024-01-09 NOTE — PROGRESS NOTE ADULT - SUBJECTIVE AND OBJECTIVE BOX
Ortho Note    Pt seen and examined. Pt wishes to use daughter instead of translation service for communication.  Comfortable without complaints, pain controlled  Denies CP, SOB, N/V, numbness/tingling     Vital Signs Last 24 Hrs  T(C): 36.7 (01-09-24 @ 08:53), Max: 36.7 (01-09-24 @ 08:53)  T(F): 98.1 (01-09-24 @ 08:53), Max: 98.1 (01-09-24 @ 08:53)  HR: 74 (01-09-24 @ 08:53) (74 - 74)  BP: 101/55 (01-09-24 @ 08:53) (101/55 - 101/55)  BP(mean): --  RR: 16 (01-09-24 @ 08:53) (16 - 16)  SpO2: 96% (01-09-24 @ 08:53) (96% - 96%)  AVSS    General: Pt Alert and oriented, NAD  DSG- aquacel C/D/I  Pulses: +2DP, WWP feet  Sensation: SILT BLE  Motor: 5/5 EHL/FHL/TA/GS BLE                          12.4   15.27 )-----------( 295      ( 09 Jan 2024 05:30 )             37.7     01-09    140  |  108  |  12  ----------------------------<  124<H>  4.0   |  24  |  0.58    Ca    8.5      09 Jan 2024 05:30      A/P 73y F PO#1 s/p left toatl knee replacement  - Stable  - Pain Control  - DVT ppx: ASA 81mg bid  - PT, WBS: WBAT  - OOB for meals, I/S  - bowel regimen  - dispo: home with HPT (cleared today)    Ortho Pager [phone] Ortho Note    Pt seen and examined. Pt wishes to use daughter instead of translation service for communication.  Comfortable without complaints, pain controlled  Denies CP, SOB, N/V, numbness/tingling     Vital Signs Last 24 Hrs  T(C): 36.7 (01-09-24 @ 08:53), Max: 36.7 (01-09-24 @ 08:53)  T(F): 98.1 (01-09-24 @ 08:53), Max: 98.1 (01-09-24 @ 08:53)  HR: 74 (01-09-24 @ 08:53) (74 - 74)  BP: 101/55 (01-09-24 @ 08:53) (101/55 - 101/55)  BP(mean): --  RR: 16 (01-09-24 @ 08:53) (16 - 16)  SpO2: 96% (01-09-24 @ 08:53) (96% - 96%)  AVSS    General: Pt Alert and oriented, NAD  DSG- aquacel C/D/I  Pulses: +2DP, WWP feet  Sensation: SILT BLE  Motor: 5/5 EHL/FHL/TA/GS BLE                          12.4   15.27 )-----------( 295      ( 09 Jan 2024 05:30 )             37.7     01-09    140  |  108  |  12  ----------------------------<  124<H>  4.0   |  24  |  0.58    Ca    8.5      09 Jan 2024 05:30      A/P 73y F PO#1 s/p left toatl knee replacement  - Stable  - Pain Control  - DVT ppx: ASA 81mg bid  - PT, WBS: WBAT  - OOB for meals, I/S  - bowel regimen  - dispo: home with HPT (cleared today)    Ortho Pager [phone]

## 2024-01-09 NOTE — DISCHARGE NOTE PROVIDER - NSDCFUSCHEDAPPT_GEN_ALL_CORE_FT
[facility]  ORTHOSURG [address]  Scheduled Appointment: 01/22/2024     [facility]  ORTHOSURG [address]  Scheduled Appointment: 01/22/2024

## 2024-01-09 NOTE — DISCHARGE NOTE PROVIDER - NSDCMRMEDTOKEN_GEN_ALL_CORE_FT
CeleBREX 100 mg oral capsule: 1 cap(s) orally prn   with patient acetaminophen 500 mg oral tablet: 2 tab(s) orally every 8 hours  aspirin 81 mg oral delayed release tablet: 1 tab(s) orally every 12 hours for 30 days after surgery MDD: 2  celecoxib 200 mg oral capsule: 1 cap(s) orally every 12 hours  morphine 15 mg/12 hr oral tablet, extended release: 1 tab(s) orally once a day as needed for extended pain relief  oxyCODONE 5 mg oral tablet: 1 tab(s) orally every 4 hours as needed for  severe pain  pantoprazole 40 mg oral delayed release tablet: 1 tab(s) orally once a day (before a meal)   acetaminophen 500 mg oral tablet: 2 tab(s) orally every 8 hours  aspirin 81 mg oral delayed release tablet: 1 tab(s) orally every 12 hours for 30 days after surgery MDD: 2  celecoxib 200 mg oral capsule: 1 cap(s) orally every 12 hours  morphine 15 mg/12 hr oral tablet, extended release: 1 tab(s) orally once a day as needed for extended pain relief DO NOT TAKE IF PAIN WELL-CONTROLLED BY ACETAMINOPHEN, CELECOXIB, AND OXYCODONE  oxyCODONE 5 mg oral tablet: 1 tab(s) orally every 4 hours as needed for  severe pain  pantoprazole 40 mg oral delayed release tablet: 1 tab(s) orally once a day (before a meal)

## 2024-01-09 NOTE — DISCHARGE NOTE PROVIDER - CARE PROVIDER_API CALL
Gurjit Sanderson  Joint Reconstruction  [address]  [place] [address]  Phone: [phone]  Fax: [phone]  Follow Up Time: 1 week   Gurjit Sanderson  Joint Reconstruction  [address]  [place] [address]  Phone: [phone]  Fax: [phone]  Follow Up Time: 1 week

## 2024-01-09 NOTE — DISCHARGE NOTE PROVIDER - NSDCFUADDINST_GEN_ALL_CORE_FT
****Please follow Dr. Sanderson’s instruction sheets- attached****      ACTIVITY:   - Weight bear as tolerated with assistive device. No strenuous activity, heavy lifting, driving or returning to work until cleared by MD.   - Apply a cold compress to the surgical site several times daily to reduce pain and swelling. For icing, twenty-minute sessions followed by an hour off is recommended. You should ice as frequently as possible. Ice should NEVER be placed directly on the skin. Wearing compression stockings during the first week after surgery can help reduce swelling in your knee, calf and foot, but is not required.      (KNEE REPLACEMENTS)   DO place a pillow under your heel when elevating the leg, to encourage full extension of the knee. Do NOT place a pillow behind your knee for comfort, as this can lead to permanent difficulty straightening your leg. It is normal to develop some swelling in the leg, ankle, and foot because of gravity.     DRESSING/SHOWERING:   (AQUACEL – brown gel dressing)   - You may shower, your dressing is water-resistant. Do not soak in bathtubs. Remove dressing after postop day 7, then leave incision open to air. You may do this yourself (simply peel it off), or you may ask for assistance from your visiting nurse. Keep your incision clean and dry. Do not pick at your incision. Do not apply creams, ointments or oils to your incision until cleared by your surgeon. Do not soak your incision in sitting water (ie tubs, pools, lakes, etc.) until cleared by your surgeon. Do not scrub the incision – instead, allow soap and water to flow over the incision and then pat it dry with a clean towel.     MEDICATION/ANTICOAGULATION:   -You have been prescribed Aspirin, as a preventative to help prevent postoperative blood clots. Please take this medication as prescribed.    - You have been prescribed medications for pain: please follow instructions on Dr. Sanderson's attached paperwork   - For any additional medications, follow instructions on the bottle.    -Try to have regular bowel movements. Take stool softener or laxative if necessary. You may wish to take Miralax daily until you have regular bowel movements.    - If you have been prescribed Aspirin or an anti-inflammatory, please take prilosec (omeprazole) once a day, before breakfast, until no longer taking Aspirin or anti-inflammatory. This will help protect your stomach.   - If you have a pain management physician, please follow-up with them postoperatively.    - If you experience any negative side effects of your medications, please call your surgeon's office to discuss.      FOLLOW-UP:   - Call to schedule an appt with Dr. Sanderson for follow up.   - Please follow-up with your primary care physician or any other specialist you see postoperatively, if needed.    - Contact your doctor or go to the emergency room if you experience: fever greater than 101.5, chills, chest pain, difficulty breathing, redness or excessive drainage around the incision, other concerns.

## 2024-01-09 NOTE — DISCHARGE NOTE PROVIDER - NSDCCPTREATMENT_GEN_ALL_CORE_FT
PRINCIPAL PROCEDURE  Procedure: Left total knee replacement  Findings and Treatment: osteoarthritis

## 2024-01-09 NOTE — DISCHARGE NOTE PROVIDER - HOSPITAL COURSE
Admit to Orthopedics   OR for left TKA   Periop Antibiotics- Ancef   DVT ppx- ASA 81 BID, SCDs  PT- WBAT, OOB  Pain control      Please follow Dr. Sanderson’s instruction sheets   ACTIVITY:   - Weight bear as tolerated with assistive device. No strenuous activity, heavy lifting, driving or returning to work until cleared by MD.   - Apply a cold compress to the surgical site several times daily to reduce pain and swelling. For icing, twenty-minute sessions followed by an hour off is recommended. You should ice as frequently as possible. Ice should NEVER be placed directly on the skin. Wearing compression stockings during the first week after surgery can help reduce swelling in your knee, calf and foot, but is not required.      (KNEE REPLACEMENTS)   DO place a pillow under your heel when elevating the leg, to encourage full extension of the knee. Do NOT place a pillow behind your knee for comfort, as this can lead to permanent difficulty straightening your leg. It is normal to develop some swelling in the leg, ankle, and foot because of gravity.     DRESSING/SHOWERING:   (AQUACEL – brown gel dressing)   - You may shower, your dressing is water-resistant. Do not soak in bathtubs. Remove dressing after postop day 7, then leave incision open to air. You may do this yourself (simply peel it off), or you may ask for assistance from your visiting nurse. Keep your incision clean and dry. Do not pick at your incision. Do not apply creams, ointments or oils to your incision until cleared by your surgeon. Do not soak your incision in sitting water (ie tubs, pools, lakes, etc.) until cleared by your surgeon. Do not scrub the incision – instead, allow soap and water to flow over the incision and then pat it dry with a clean towel.     MEDICATION/ANTICOAGULATION:   -You have been prescribed Aspirin, as a preventative to help prevent postoperative blood clots. Please take this medication as prescribed.    - You have been prescribed medications for pain: please follow instructions on Dr. Sanderson's attached paperwork   - For any additional medications, follow instructions on the bottle.    -Try to have regular bowel movements. Take stool softener or laxative if necessary. You may wish to take Miralax daily until you have regular bowel movements.    - If you have been prescribed Aspirin or an anti-inflammatory, please take prilosec (omeprazole) once a day, before breakfast, until no longer taking Aspirin or anti-inflammatory. This will help protect your stomach.   - If you have a pain management physician, please follow-up with them postoperatively.    - If you experience any negative side effects of your medications, please call your surgeon's office to discuss.      FOLLOW-UP:   - Call to schedule an appt with Dr. Sanderson for follow up.   - Please follow-up with your primary care physician or any other specialist you see postoperatively, if needed.    - Contact your doctor or go to the emergency room if you experience: fever greater than 101.5, chills, chest pain, difficulty breathing, redness or excessive drainage around the incision, other concerns. Admit to Orthopedics   OR for left TKA   Periop Antibiotics- Ancef   DVT ppx- ASA 81 BID, SCDs  PT- WBAT, OOB  Pain control    Please follow Dr. Sanderson’s instruction sheets

## 2024-01-09 NOTE — DISCHARGE NOTE PROVIDER - PROVIDER TOKENS
PROVIDER:[TOKEN:[[id number]],FOLLOWUP:[1 week]] PROVIDER:[TOKEN:[[id number]],FOLLOWUP:[1 week]]

## 2024-01-09 NOTE — DISCHARGE NOTE NURSING/CASE MANAGEMENT/SOCIAL WORK - PATIENT PORTAL LINK FT
You can access the FollowMyHealth Patient Portal offered by [facility] by registering at the following website: [email or web address]. By joining [facility]’s FollowMyHealth portal, you will also be able to view your health information using other applications (apps) compatible with our system. You can access the FollowMyHealth Patient Portal offered by [facility] by registering at the following website: [email or web address]. By joining [facility]’s FollowMyHealth portal, you will also be able to view your health information using other applications (apps) compatible with our system.

## 2024-01-09 NOTE — CONSULT NOTE ADULT - SUBJECTIVE AND OBJECTIVE BOX
HPI:  73yoF Cantonese speaking PMHx of LLL lung nodule since 2014 ( last CT Chest showed LLL nodule measured 1.7cm in 2021 and being followed by Pulm Dr. Ashvin Miranda), s/p myomectomy, severe DJD L knee for 10 years, underwent and elective left total knee replacement with Dr. Sanderson. (04 Jan 2024 20:32)  POD# 1  Pt reports miminal L knee pain only s/p APAP    PAST MEDICAL & SURGICAL HISTORY:  Osteoarthritis of left knee      H/O myomectomy        Home Medications:  acetaminophen 500 mg oral tablet: 2 tab(s) orally every 8 hours (09 Jan 2024 12:08)  celecoxib 200 mg oral capsule: 1 cap(s) orally every 12 hours (09 Jan 2024 12:08)  morphine 15 mg/12 hr oral tablet, extended release: 1 tab(s) orally once a day as needed for extended pain relief DO NOT TAKE IF PAIN WELL-CONTROLLED BY ACETAMINOPHEN, CELECOXIB, AND OXYCODONE (09 Jan 2024 12:23)  oxyCODONE 5 mg oral tablet: 1 tab(s) orally every 4 hours as needed for  severe pain (09 Jan 2024 12:08)  pantoprazole 40 mg oral delayed release tablet: 1 tab(s) orally once a day (before a meal) (09 Jan 2024 12:21)    Allergies    No Known Allergies    Intolerances      FAMILY HISTORY:    Social History:  denies smoking history (04 Jan 2024 20:32)      REVIEW OF SYSTEMS:  CONSTITUTIONAL: No fever, weight loss  EYES: No eye pain, or discharge  ENMT:  No tinnitus, vertigo  NECK: No pain or stiffness  RESPIRATORY: No cough, No dyspnea  CARDIOVASCULAR: No chest pain, or leg swelling  GASTROINTESTINAL: No abdominal pain. No diarrhea ;No melena or hematochezia.  GENITOURINARY: No dysuria, frequency, or hematuria  NEUROLOGICAL: No numbness, or tremors  SKIN: No itching, burning, rashes, or lesions   ENDOCRINE: No heat or cold intolerance;  MUSCULOSKELETAL: No joint pain or swelling;   PSYCHIATRIC: No mood swings, or difficulty sleeping  HEME/LYMPH: No easy bruising, or bleeding gums  ALLERGY AND IMMUNOLOGIC: No hives or eczema    Diet, Regular (01-08-24 @ 11:33) [Active]      CURRENT MEDICATIONS:   acetaminophen     Tablet .. 1000 milliGRAM(s) Oral every 8 hours  aspirin enteric coated 81 milliGRAM(s) Oral two times a day  celecoxib 200 milliGRAM(s) Oral every 12 hours  HYDROmorphone  Injectable 0.5 milliGRAM(s) IV Push every 15 minutes PRN  HYDROmorphone  Injectable 0.5 milliGRAM(s) IV Push every 3 hours PRN  lactated ringers. 1000 milliLiter(s) IV Continuous <Continuous>  magnesium hydroxide Suspension 30 milliLiter(s) Oral daily PRN  multivitamin 1 Tablet(s) Oral daily  ondansetron Injectable 4 milliGRAM(s) IV Push every 6 hours PRN  oxyCODONE    IR 5 milliGRAM(s) Oral every 4 hours PRN  oxyCODONE    IR 10 milliGRAM(s) Oral every 6 hours PRN  pantoprazole    Tablet 40 milliGRAM(s) Oral before breakfast  polyethylene glycol 3350 17 Gram(s) Oral at bedtime  senna 2 Tablet(s) Oral at bedtime      VITAL SIGNS, INS/OUTS (last 24 hours):  Vital Signs Last 24 Hrs  T(C): 36.7 (09 Jan 2024 08:53), Max: 37 (08 Jan 2024 20:30)  T(F): 98.1 (09 Jan 2024 08:53), Max: 98.6 (08 Jan 2024 20:30)  HR: 74 (09 Jan 2024 08:53) (73 - 84)  BP: 101/55 (09 Jan 2024 08:53) (101/55 - 116/71)  BP(mean): 82 (08 Jan 2024 15:26) (74 - 82)  RR: 16 (09 Jan 2024 08:53) (13 - 20)  SpO2: 96% (09 Jan 2024 08:53) (93% - 96%)    Parameters below as of 09 Jan 2024 08:53  Patient On (Oxygen Delivery Method): room air      I&O's Summary    08 Jan 2024 07:01  -  09 Jan 2024 07:00  --------------------------------------------------------  IN: 3405 mL / OUT: 1150 mL / NET: 2255 mL        PHYSICAL EXAM:  Gen: Reclining in bed at time of exam, appears stated age  HEENT: NCAT, MMM, clear OP  Neck: supple, trachea at midline  CV: RRR, +S1/S2  Pulm: adequate respiratory effort, no increase in work of breathing  Abd: soft, NTND  Skin: warm and dry,  Ext: WWP, no LE edema, L knee dressing in place   Neuro: AOx3, no gross focal neurological deficits  Psych: affect and behavior appropriate, pleasant at time of interview    BASIC LABS:                        12.4   15.27 )-----------( 295      ( 09 Jan 2024 05:30 )             37.7     01-09    140  |  108  |  12  ----------------------------<  124<H>  4.0   |  24  |  0.58    Ca    8.5      09 Jan 2024 05:30        Urinalysis Basic - ( 09 Jan 2024 05:30 )    Color: x / Appearance: x / SG: x / pH: x  Gluc: 124 mg/dL / Ketone: x  / Bili: x / Urobili: x   Blood: x / Protein: x / Nitrite: x   Leuk Esterase: x / RBC: x / WBC x   Sq Epi: x / Non Sq Epi: x / Bacteria: x      CAPILLARY BLOOD GLUCOSE          OTHER LABS:        MICRODATA:      IMAGING:    EKG:    #Diet - Diet, Regular (01-08-24 @ 11:33) [Active]        #DVT PPx -

## 2024-01-09 NOTE — CONSULT NOTE ADULT - ASSESSMENT
73yoF Cantonese speaking PMHx of LLL lung nodule since 2014 ( last CT Chest showed LLL nodule measured 1.7cm in 2021 and being followed by Pulm Dr. Ashvin Miranda), s/p myomectomy, severe DJD L knee for 10 years, underwent and elective left total knee replacement with Dr. Sanderson. (04 Jan 2024 20:32)  POD# 1     # DJD L knee s/p L TKR ( 1/8)   - POD 1   - s/p pain control/bowel regimen per Ortho   - ASA 81mg po bid for DVT ppx   - PT eval appreciated WBAT     # LLL lung nodule since 2014  - last CT Chest : 1.7cm LLL nodule 2 years ago per outside Pulm Dr. Ashvin Miranda  - CXR showed LLL nodule ~ 2cm   - Pt needs a repeat CT Chest wo contrast   - f/u Pulm Dr. Ashvin Miranda     Contacts:  Pulm Dr. Ashvin Miranda   PMD Dr. Prasad Bolden He   Daughter Donita [phone]     POC as d/w Donita, and Ortho PA    Dr.Thida Leigh covering 1/10-1/17/24    73yoF Cantonese speaking PMHx of LLL lung nodule since 2014 ( last CT Chest showed LLL nodule measured 1.7cm in 2021 and being followed by Pulm Dr. Ashvin Miranda), s/p myomectomy, severe DJD L knee for 10 years, underwent and elective left total knee replacement with Dr. Sanderson. (04 Jan 2024 20:32)  POD# 1     # DJD L knee s/p L TKR ( 1/8)   - POD 1   - s/p pain control/bowel regimen per Ortho   - ASA 81mg po bid for DVT ppx   - PT eval appreciated WBAT     # LLL lung nodule since 2014  - last CT Chest : 1.7cm LLL nodule 2 years ago per outside Pulm Dr. Ashvin Miranda  - CXR showed LLL nodule ~ 2cm   - Pt needs a repeat CT Chest wo contrast   - f/u Pulm Dr. Ashvin Miranda     Contacts:  Pulm Dr. Ashvin Miranda   PMD Dr. Prasad Bolden He   Daughter Donita [phone]     POC as d/w Donita, and Ortho PA    Dr.Thida Leigh covering 1/10-1/17/24

## 2024-01-11 DIAGNOSIS — M17.12 UNILATERAL PRIMARY OSTEOARTHRITIS, LEFT KNEE: ICD-10-CM

## 2024-01-11 DIAGNOSIS — R91.1 SOLITARY PULMONARY NODULE: ICD-10-CM

## 2024-01-19 ENCOUNTER — APPOINTMENT (OUTPATIENT)
Dept: ORTHOPEDIC SURGERY | Facility: CLINIC | Age: 74
End: 2024-01-19

## 2024-01-22 ENCOUNTER — OUTPATIENT (OUTPATIENT)
Dept: OUTPATIENT SERVICES | Facility: HOSPITAL | Age: 74
LOS: 1 days | End: 2024-01-22
Payer: MEDICARE

## 2024-01-22 ENCOUNTER — RESULT REVIEW (OUTPATIENT)
Age: 74
End: 2024-01-22

## 2024-01-22 ENCOUNTER — APPOINTMENT (OUTPATIENT)
Dept: ORTHOPEDIC SURGERY | Facility: CLINIC | Age: 74
End: 2024-01-22
Payer: MEDICARE

## 2024-01-22 VITALS
WEIGHT: 125 LBS | DIASTOLIC BLOOD PRESSURE: 75 MMHG | SYSTOLIC BLOOD PRESSURE: 127 MMHG | OXYGEN SATURATION: 97 % | BODY MASS INDEX: 23.6 KG/M2 | HEIGHT: 61 IN | HEART RATE: 79 BPM

## 2024-01-22 DIAGNOSIS — Z98.890 OTHER SPECIFIED POSTPROCEDURAL STATES: Chronic | ICD-10-CM

## 2024-01-22 PROBLEM — M17.12 UNILATERAL PRIMARY OSTEOARTHRITIS, LEFT KNEE: Chronic | Status: ACTIVE | Noted: 2024-01-04

## 2024-01-22 PROCEDURE — 73562 X-RAY EXAM OF KNEE 3: CPT

## 2024-01-22 PROCEDURE — 99024 POSTOP FOLLOW-UP VISIT: CPT

## 2024-01-22 PROCEDURE — 73562 X-RAY EXAM OF KNEE 3: CPT | Mod: 26,LT

## 2024-01-22 RX ORDER — ASPIRIN ENTERIC COATED TABLETS 81 MG 81 MG/1
81 TABLET, DELAYED RELEASE ORAL
Qty: 60 | Refills: 0 | Status: ACTIVE | COMMUNITY
Start: 2024-01-22 | End: 1900-01-01

## 2024-01-22 NOTE — HISTORY OF PRESENT ILLNESS
[___ Weeks Post Op] : [unfilled] weeks post op [de-identified] : T INGRIDA 01/__/24 [de-identified] : 01/22/24: 72 y/o female presenting now about 2 weeks status post left total knee replacement, overall doing well. patient reports that her pain is about 1/10 and she has been taking Tylenol and Celebrex as needed. She states home physical therapy and visiting nursing services went well. Denies any fever, chills, or drainage from the surgical site.  [de-identified] : General appearance: well nourished and hydrated, pleasant, alert and oriented x 3, cooperative. HEENT: normocephalic, EOM intact, wearing mask, external auditory canal clear. Cardiovascular: no lower leg edema, no varicosities, dorsalis pedis pulses palpable and symmetric. Lymphatics: no palpable lymphadenopathy, no lymphedema. Neurologic: sensation is normal, no muscle weakness in upper or lower extremities, patella tendon reflexes present and symmetric. Dermatologic: skin moist, warm, no rash. Spine: cervical spine with normal lordosis and painless range of motion, thoracic spine with normal kyphosis and painless range of motion, lumbosacral spine with normal lordosis and painless range of motion.  No tenderness to palpation along midline spine and paraspinal musculature.  Sacroiliac joints nontender bilaterally. Negative SLR and crossed SLR tests bilaterally. Gait: normal. Left knee: - Focal soft tissue swelling: none - Ecchymosis: none - Erythema: none - Effusion: none, no palpable Baker's cyst - Wounds: none - Alignment: normal - Tenderness: none - ROM: - Collateral laxity: none - Cruciate laxity: none - Meniscal signs: negative Helga and Pedro - Popliteal angle (degrees): - Quad strength: Right knee: - Focal soft tissue swelling: none - Ecchymosis: none - Erythema: none - Effusion: none, no palpable Baker's cyst - Wounds: none - Alignment: normal - Tenderness: none - ROM: - Collateral laxity: none - Cruciate laxity: none - Meniscal signs: negative Helga and Pedro - Popliteal angle (degrees): - Quad strength:  Limb lengths: clinically equal   Left hip: - Focal soft tissue swelling: none - Ecchymosis: none - Erythema: none - Wounds: none - Tenderness: none - ROM:   - Flexion:   - Extension:   - Adduction:   - Abduction:   - Internal rotation in 90 degrees of hip flexion:   - External rotation in 90 degrees of hip flexion: - BALDO: negative - FADIR: negative - Jagdeep: negative - Stinchfield: negative - Flexor power: - Abductor power:     Right hip: - Focal soft tissue swelling: none - Ecchymosis: none - Erythema: none - Wounds: none - Tenderness: none - ROM:   - Flexion:   - Extension:   - Adduction:   - Abduction:   - Internal rotation in 90 degrees of hip flexion:   - External rotation in 90 degrees of hip flexion: - BALDO: negative - FADIR: negative - Jagdeep: negative - Stinchfield: negative - Flexor power: - Abductor power: [de-identified] : General appearance: well nourished and hydrated, pleasant, alert and oriented x 3, cooperative. HEENT: normocephalic, EOM intact, wearing mask, external auditory canal clear. Cardiovascular: no lower leg edema, no varicosities, dorsalis pedis pulses palpable and symmetric. Lymphatics: no palpable lymphadenopathy, no lymphedema. Neurologic: sensation is normal, no muscle weakness in upper or lower extremities, patella tendon reflexes present and symmetric. Dermatologic: skin moist, warm, no rash. Spine: cervical spine with normal lordosis and painless range of motion, thoracic spine with normal kyphosis and painless range of motion, lumbosacral spine with normal lordosis and painless range of motion.  No tenderness to palpation along midline spine and paraspinal musculature.  Sacroiliac joints nontender bilaterally. Negative SLR and crossed SLR tests bilaterally. Gait: She presents with an antalgic gait with the use of a cane today. Left knee: - Focal soft tissue swelling: none - Ecchymosis: none - Erythema: none - Effusion: none, no palpable Baker's cyst - Wounds: well-healed anterior surgical incision, benign appearing.  - Alignment: normal - Tenderness: none - ROM: 3-105 - Collateral laxity: none - Cruciate laxity: none - Meniscal signs: negative Helga and Pedro - Popliteal angle (degrees): - Quad strength: 5/5  [de-identified] : Imp: 74 y/o female now about 2 weeks status post left total knee replacement.  - I encouraged her to continue with Tylenol and Celebrex as needed for pain.  - I will provide her a script for Aspirin medication to begin a course of 30 days.  - I will also provide her a physical therapy script as well as a home exercise program.  - We will follow up again in 4 weeks with left knee x-rays.

## 2024-01-22 NOTE — END OF VISIT
[FreeTextEntry3] : All medical record entries made by the Scribe were at my, Dr. Gurjit Sanderson, direction and personally dictated by me on 01/22/2024. I have reviewed the chart and agree that the record accurately reflects my personal performance of the history, physical exam, assessment and plan. I have also personally directed, reviewed, and agreed with the chart.

## 2024-02-19 NOTE — ASU PATIENT PROFILE, ADULT - PATIENT'S PREFERRED PRONOUN
Comprehensive CCD (C-CDA v2.1)  
  
                          Created on: 2024  
  
  
JESSICA QUICK  
External Reference #: [id number]  
: 1943  
Sex: Female  
  
Demographics  
  
  
                                        Address             [address] [place]  [address]  
   
                                        Home Phone          [phone]  
   
                                        Mobile Phone        [phone]  
   
                                        Preferred Language  en  
   
                                        Marital Status        
   
                                        [employer] Affiliation Unknown  
   
                                        Race                White  
   
                                        Ethnic Group        Not  or Lati  
no  
  
  
Author  
  
  
                                        Name                Unknown  
   
                                        Address             [address]  
[address]  
[place]  [address]  
   
                                        Phone               [phone]  
   
                                        Organization        [facility]  
  
  
Care Team Providers  
  
  
                                Care Team Member Name Role            Phone  
   
                                UNKNOWN, PROVIDER Attending       Unavailable  
   
                                RAUL WHITAKER Primary Care    Unavailable  
   
                                UNKNOWN, PROVIDER Attending       Unavailable  
   
                                RAUL WHITAKER Primary Care    Unavailable  
   
                                UNKNOWN, PROVIDER Attending       Unavailable  
   
                                RAUL WHITAKER Primary Care    Unavailable  
   
                                UNKNOWN, PROVIDER Attending       Unavailable  
   
                                NO FAMILY DOCTOR, NO FAMILY DOCTOR Primary Care   
   Unavailable  
   
                                FERNANDO KOROMA Admitting       Unavailable  
   
                                FERNANDO KOROMA Attending       Unavailable  
   
                                CLAUDIO BRITO  Primary Care    Unavailable  
   
                                TACOS PEREZ Consulting      Unavailable  
   
                                FERNANDO KOROMA Consulting      Unavailable  
   
                                CLAUDIO BRITO  Primary Care    Unavailable  
   
                                STRUS, BLAIR    Admitting       Unavailable  
   
                                STRUS, BLAIR    Attending       Unavailable  
   
                                STRUS, BLAIR    Consulting      Unavailable  
   
                                DARYL, CLAUDIO  Primary Care    Unavailable  
   
                                ILDA, JACK       Admitting       Unavailable  
   
                                ILDA, JACK       Attending       Unavailable  
   
                                ROSA JOVEL   Consulting      Unavailable  
   
                                ILDA, JACK       Consulting      Unavailable  
   
                                DARYL, CLAUDIO  Primary Care    Unavailable  
   
                                HAY, JACK       Admitting       Unavailable  
   
                                ILDA, JACK       Attending       Unavailable  
   
                                TACOS PEREZ Consulting      Unavailable  
   
                                ILDA, JACK       Consulting      Unavailable  
   
                                Claudio Brito Unavailable     [phone]  
   
                                Unavailable     Unavailable     [phone]  
   
                                Andrew Freeman  Unavailable     [phone]  
   
                                Rona Irizarry Unavailable     [phone]  
   
                                Micki Fischer Unavailable     [phone]  
   
                                DO Claudio Brito Primary Care Provider [phone]  
   
                                DO Andrew Freeman Attending Provider [phone]  
   
                                DO Bart Guzmán Emergency Provider [phone]  
   
                                DO Claudio Brito Primary Care Provider [phone]  
   
                                DO Andrew Freeman Attending Provider [phone]  
   
                                Dr. Tayo Norris Attending       Rianava  
davie Brito, Dr. Claudio Pozo Primary Care    Pablo Norris, Dr. Tayo Rivera Referring       Unava  
ilable  
   
                                House, Dr. Claudio Pozo Primary Care    Pablo Jovel, KYLE Alas Referring       Unavailable  
   
                                KYLE Jovel Attending       Unavailable  
   
                                DO Claudio Brito Primary Care Provider [phone]  
   
                                DO Morteza singh Emergency Provider [phone]  
   
                                Unavailable     Primary Care Provider UnavailTAYO Gaines Attending       Unavailable  
   
                                TAYO NORRIS Referring       Unavailable  
   
                                CLAUDIO BRITO Primary Care    Unavailab  
STEVE Leo Emergency Provider [phone]  
   
                                VernonDO Snyder Primary Care Provider [phone]  
[phone]  
   
                                Beecher, DO Hermann T Admit Provider  [phone]  
   
                                Beecher,  Hermann T Attending Provider [phone]  
   
                                Morteza Nguyễn Attending       Unavailable  
   
                                Claudio Brito  Primary Care    Unavailable  
   
                                Morteza Nguyễn Admitting       Unavailable  
   
                                RoqueHermann bernabe Attending       Unavailable  
   
                                Claudio Brito  [facility]    Unavailable  
   
                                BeecherHermann Admitting       Unavailable  
   
                                Blake CARROLL, Eric DEL ANGEL Attending       Unavailable  
   
                                Blake CARROLL, Eric DEL ANGEL Attending       Unavailable  
   
                                Eric Lozano MD Attending       Unavailable  
   
                                CLAUDIO BRITO Primary Care    Unavailable  
  
  
  
Allergies  
  
  
                                                    Allergy   
Classification      Reported Allergen(s) Allergy Type        Date of   
Onset                     Reaction(s)               Facility  
   
                                                      
(1 source)                              amLODIPine /   
valsartan                 Drug Allergy                
015                                                 [facility]   
Repository  
   
                                                      
(1 source)                              hydroCHLOROthiazide /   
Lisinopril                Drug Allergy                
015                                                 [facility]   
Repository  
   
                                                      
(1 source)          irbesartan          Drug Allergy          
015                                                 [facility]   
Repository  
   
                                                      
(20 sources)        levoFLOXacin        Drug Allergy          
015                       Unknown                   [facility]   
Repository  
   
                                                      
(20 sources)        Verapamil           Drug Allergy          
015                       Unknown                   [facility]   
Repository  
   
                                                      
(14 sources)                            Angiotensin   
Converting Enzyme   
(Ace) Inhibitors;   
Translations: [ACE   
Inhibitors]                             Allergy to   
drug   
(finding)                                 
023                       [facility]   
Repository  
   
                                                      
(15 sources)                            Lisinopril;   
Translations:   
[lisinopril]              Drug Allergy                
023                                     Other, Cough,   
Headache                                [facility]   
[facility]   
250 DO  
Work Phone:   
[phone]  
   
                                                      
(20 sources)                            amLODIPine /   
valsartan       Drug Allergy                    Unknown         [facility]   
[facility]  
Other Phone:   
[phone]  
   
                                                      
(20 sources)                            hydroCHLOROthiazide /   
Lisinopril      Drug Allergy                    Unknown         [facility]   
[facility]  
Other Phone:   
[phone]  
   
                                                      
(20 sources) irbesartan   Drug Allergy              Unknown      [facility]   
[facility]  
Other Phone:   
[phone]  
   
                                                      
(1 source)                              Angiotensin-convertin  
g enzyme inhibitor   
agent                                   Drug   
Intolerance                               
023                                     Other, Cough,   
Headache                                [facility]  
  
  
  
Medications  
Current Medications  
  
  
  
                      Medication Drug Class(es) Dates      Sig (Normalized) Sig   
(Original)  
   
                                                    ACETAMINOPHEN EXTRA   
STRENGTH ORAL  
(1 source)                                                  take 2 capsules by   
mouth four times   
daily                                   ACETAMINOPHEN EXTRA   
STRENGTH ORAL Take   
2 capsules by mouth   
4 times a day. 0   
Active  
   
                                                    apixaban 5 mg oral   
tablet  
(20 sources)                            Factor Xa   
Inhibitor                               Start:   
10-                              take 1 tablet by   
mouth twice daily                       apixaban (Eliquis)   
5 mg tablet   
Indications:   
Chronic atrial   
fibrillation   
(CMS/HCC) Take 1   
tablet (5 mg) by   
mouth 2 times a   
day. 90 tablet 3   
10/10/2023 Active  
  
  
  
                                        Start: 2018   take 1 tablet by marian  
th every   
twelve hours                            Apixaban (Eliquis) 5 mg Tablet   
Active 5 MG PO Q12H 2018 12:00am  
   
                                                    Start: 2018  
End: 10-                         take 1 tablet by mouth every   
twelve hours                            Eliquis 5 MG 1 tablet Orally bid  Active  
  
  
  
                                                    Aspir-81 81 MG  
(20 sources)                                                      
  
  
  
                                                                Aspir-81 81 MG O  
rally Once a day every other day Active  
  
  
  
                                                    aspirin 81 mg   
delayed release   
oral tablet  
(20 sources)                            Platelet Aggregation   
Inhibitor, Nonsteroidal   
Anti-inflammatory Drug    Start: 2021         take 1 tablet   
by mouth   
every twelve   
hours                                   Aspirin 81 MG   
1 tablet   
Orally BID for   
35 days do not   
fill until 1   
day prior to   
surgery 08   
Dec, 2021   
Active  
  
  
  
                                                    Start: 2018  
End: 2024           take 81 mg by mouth once daily Aspirin Discontinued 81  
 MG PO   
Daily   
2018 12:00am 2024 2:10pm Every other day.  
  
  
  
                                                    atorvastatin 40 mg   
oral tablet  
(20 sources)                            HMG-CoA   
Reductase   
Inhibitor                               Start:   
10-                              take 1 tablet   
by mouth once   
daily at   
bedtime                                 atorvastatin   
(Lipitor) 40 mg   
tablet Indications:   
Hyperlipidemia,   
unspecified   
hyperlipidemia type   
Take 1 tablet (40 mg)   
by mouth once daily   
at bedtime. 90 tablet   
3 10/10/2023 Active  
  
  
  
                                                    Start: 2021  
End: 10-                         take 40 mg by mouth once daily   
at bedtime                              Atorvastatin Active 40 MG PO Daily   
at bedtime 2021   
12:00am  
   
                                                    Start: 2018  
End: 2021           take 40 mg by mouth once daily Atorvastatin Discontinu  
ed 40 MG   
PO   
daily 2018 12:00am   
2021 4:21pm  
  
  
  
                                                    cyclobenzaprine   
hydrochloride 10 mg   
oral tablet  
(20 sources)              Muscle Relaxant           Start:   
2024                              take 10 mg   
by mouth   
once daily                              Cyclobenzaprine   
Active 10 MG PO Daily   
2024   
12:00am  
  
  
  
                                        Start: 10-   take 1 tablet by marian  
th three   
times daily as needed                   cyclobenzaprine (Flexeril) 10 mg   
tablet Take 1 tablet (10 mg) by mouth   
3 times a day as needed. 0 10/04/2022   
Active  
   
                                                    Start: 2022  
End: 2022                         take 10 mg by mouth every   
eight hours                             Cyclobenzaprine Discontinued 10 MG PO   
Q8H  12:00am   
2022 5:06pm  
   
                                                    Start: 2021  
End: 2022                         take 10 mg by mouth three   
times daily                             Cyclobenzaprine Discontinued 10 MG PO   
Three times daily 2022   
12:00am 2022 5:06pm  
   
                                                    Start: 2018  
End: 2019                         take 10 mg by mouth once daily   
at bedtime                              Cyclobenzaprine Discontinued 10 MG PO   
Daily at bedtime 2018   
12:00am 2019 11:56am  
  
  
  
                                                    docusate sodium 100   
mg oral capsule  
(20 sources)                            Start: 2021   take 1 capsule   
by mouth every   
twelve hours                            Colace 100 MG 1   
capsule as needed   
Orally BID for 14   
days 08 Dec, 2021   
Active  
   
                                                    gabapentin 300 mg   
oral capsule  
(20 sources)                            Anti-epileptic   
Agent               Start: 10-                       gabapentin   
(Neurontin) 300 mg   
capsule Take 1   
capsule (300 mg)   
by mouth. 2 - 3   
TIMES DAILY AS   
NEEDED 0   
10/10/2022 Active  
  
  
  
                                        Start: 2018   take 1 capsule by mo  
uth every   
eight hours                             Neurontin 300 MG 1 capsule Orally   
tid PRN 16 Mar, 2018 Active  
  
  
  
                                                    hydroCHLOROthiazide 25   
mg oral tablet  
(3 sources)                             Thiazide   
Diuretic                                Start:   
01-                              take 37.5   
mg by   
mouth once   
daily                                   Hydrochlorothiazide   
Active 37.5 MG PO Daily   
   
12:00am  
  
  
  
                                                    Start: 10-  
End: 10-                         take 25 mg by mouth once   
daily                                   Hydrochlorothiazide Discontinued 25 MG   
PO Daily 2024 12:00am   
2024 10:26am  
  
  
  
                                                    latanoprost 0.05 mg/ml   
ophthalmic solution  
(13 sources)    Prostaglandin Analog Start: 2022                 latanopro  
st (Xalatan)   
0.005 % ophthalmic   
solution Administer   
into both eyes once   
daily at bedtime. 0   
2022 Active  
  
  
  
                                        Start: 2022   take 1 drop(s) into   
the eye(s)   
at bedtime                              Latanoprost 0.005 % Ophthalmic   
Solution INSTILL 1 DROP IN BOTH   
EYES AT BEDTIME. Quantity: 0   
Refills: 0 Ordered: 6-Sep-2022 DO   
Start : 6-Sep-2022 Active  
   
                                        Start: 2019   take 1 drop(s) into   
the eye(s)   
once daily at bedtime                   Latanoprost (Xalatan) 0.005 % drops   
Active 1 DROPS EYE-BOTH Bedtime   
2019 11:00pm Instill one   
drop into affected eye once daily   
as directed.  
  
  
  
                                                    metoprolol   
tartrate 25 mg   
oral tablet  
(20 sources)                            beta-Adrenergic   
Blocker                                 Start:   
10-                              take 2   
tablets by   
mouth twice   
daily                                   metoprolol tartrate   
(Lopressor) 25 mg   
tablet Indications:   
Essential   
hypertension,   
benign Take 2   
tablets (50 mg) by   
mouth 2 times a   
day. 90 tablet 3   
10/10/2023 Active  
  
  
  
                                                    Start: 2022  
End: 10-                         take 2 tablets by mouth twice   
daily                                   metoprolol tartrate (Lopressor) 25   
mg tablet Take 2 tablets (50 mg) by   
mouth 2 times a day. 0 2022   
10/10/2023 Discontinued (Reorder)  
   
                                Start: 2018 take 50 mg by mouth twice zachery  
y Metoprolol Tartrate Active 50   
MG PO   
Twice daily 2018   
12:00am  
   
                                                            take 1 tablet by marian  
th every   
twelve hours                            Metoprolol Tartrate 25 MG 1 tablet   
with food Orally Twice a day Active  
   
                                                            take 2 tablets by mo  
uth twice   
daily                                   Metoprolol Tartrate 25 MG Oral   
Tablet TAKE 2 TABLET Twice daily   
Quantity: 0 Refills: 0 Ordered:   
12-Oct-2021 DO Active  
  
  
  
                                                    nitroglycerin 0.4 mg   
sublingual tablet  
(20 sources)    Nitrate Vasodilator Start: 2019                 Nitroglyce  
rin   
(Nitrostat) 0.4 mg   
tablet, sublingual   
Active 0.4 MG   
SUBLINGUAL EVERY 3-5   
MINUTES 2019 11:00pm  
  
  
  
                                Start: 2015                 Nitrostat 0.4   
MG 1 tablet Sublingual q5 min x3 if no relief   
call   
911 prn 13 Aug, 2015 Active  
  
  
  
                                                    omeprazole 20 mg   
delayed release oral   
capsule  
(14 sources)                            Proton Pump   
Inhibitor                                 
End: 10-                         take 1 capsule   
by mouth once   
daily before   
mealtime                                omeprazole   
(PriLOSEC) 20 mg   
DR capsule Take 1   
capsule (20 mg) by   
mouth once daily   
in the morning.   
Take before meals.   
0 10/10/2023   
Discontinued   
(Therapy   
completed)  
   
                                                    oxyCODONE   
hydrochloride 5 mg   
oral tablet  
(15 sources)        Opioid Agonist      Start: 2021   take 1 tablet   
by mouth every   
four hours                              oxyCODONE HCl 5 MG   
1 tablet as needed   
Orally every 4 hrs   
for 7 days do not   
fill until 1 day   
prior to surgery   
08 Dec, 2021   
Active  
  
  
  
                                                    Start: 2018  
End: 2019                         take 10 mg by mouth every six   
hours                                   Oxycodone Discontinued 10 MG PO Q6H   
2018 12:00am 2019 11:56am  
  
  
  
                                                    potassium chloride 10   
meq extended release   
oral tablet  
(1 source)                                          Start: 10-  
End: 10-                         take 1 tablet   
by mouth   
twice daily                             potassium chloride   
CR (Klor-Con) 10 mEq   
ER tablet   
Indications:   
Essential   
hypertension, benign   
, History of PTCA   
Take 1 tablet (10   
mEq) by mouth 2   
times a day. Do not   
crush, chew, or   
split. 60 tablet 11   
10/10/2023   
10/09/2024 Active  
   
                                                    Timolol Maleate  
(1 source)                              beta-Adrenergi  
c Blocker                 Start: 2024         take 1   
drop(s) into   
the eye(s)   
twice daily                             Timolol Maleate   
Active 1 DROPS   
EYE-BOTH Twice daily   
2024   
12:00am  
   
                                                    traMADol   
hydrochloride 50 mg   
oral tablet  
(20 sources)        Opioid Agonist      Start: 2024   take 1 mg by   
mouth once   
daily                                   Tramadol Active 1 MG   
PO Daily 2024 12:00am  
  
  
  
                                                    Start: 2021  
End: 2022                         take 50 mg by mouth every four   
hours                                   Tramadol Discontinued 50 MG PO Q4H 42   
7 2022 12:00am 2022 5:06pm  
  
  
  
                                                    valsartan 320 mg   
oral tablet  
(10 sources)                            Angiotensin 2   
Receptor Blocker                        Start: 10-  
End: 10-                         take 320 mg   
by mouth once   
daily                                   Valsartan Active   
320 MG PO Daily   
2024   
12:00am  
  
  
  
                                                    Start: 10-  
End: 10-                         take 1 tablet by mouth once   
daily                                   valsartan (Diovan) 160 mg tablet Take   
1 tablet (160 mg) by mouth once   
daily. 0 10/13/2022 10/10/2023   
Discontinued (Ineffective)  
  
  
  
Completed/Discontinued Medications  
  
  
  
                      Medication Drug Class(es) Dates      Sig (Normalized) Sig   
(Original)  
   
                                                    acetaminophen 500 mg   
oral tablet  
(20 sources)                                        Start:   
2021  
End:   
2022                              take 2 tablets by   
mouth every eight   
hours                                   Acetaminophen   
(Tylenol Extra   
Strength) 500 mg   
tablet Discontinued   
1000 MG PO Every 8   
hours 60 2022 12:00am   
2022   
5:06pm  
  
  
  
                                Start: 2021 take 2 tablets by mouth every   
eight hours   
   
                                                                  
   
                                                                Tylenol 1 tab Or  
al Active  
  
  
  
                                                    Acetaminophen Extra   
Strength CAPS  
(13 sources)                                                    Acetaminophen Ex  
tra   
Strength CAPS TAKE 2   
CAPSULES 4 TIMES   
DAILY. Quantity: 0   
Refills: 0 Ordered:   
12-Oct-2021 DO Active  
   
                                                    amiodarone   
hydrochloride 200 mg   
oral tablet  
(2 sources)               Antiarrhythmic            Start:   
10-  
2                                       take 2 tablets   
by mouth twice   
daily                                   Amiodarone HCl - 200   
MG Oral Tablet TAKE 2   
TABLET Twice daily   
Quantity: 28 Refills:   
0 Ordered: 13-Oct-2022   
Tayo Norris DO   
Start : 13-Oct-2022   
Active New start- Take   
400 MG BID for 1 week  
   
                                                    ascorbic acid 500 mg   
oral tablet  
(5 sources)               Vitamin C                 Start:   
  
End:   
  
2                                       take 1 tablet by   
mouth once daily                        Ascorbic Acid (Vitamin   
C) (Vitamin C) 500 mg   
Tablet Discontinued   
500 MG PO Daily   
2021   
12:00am July 21st,   
2022 5:06pm  
   
                                                    B12-Levomefolate   
Calcium-B6  
(5 sources)                                         Start:   
  
8  
End:   
  
9                                       take 1 tablet by   
mouth once daily                        B12-Levomefolate   
Calcium-B6   
Discontinued 1 TAB PO   
Daily  12:00am   
2019   
11:56am  
  
  
  
                                                    Start: 2018  
End: 2019                         take 1 tablet by mouth once   
daily                                   B12-Levomefolate Calcium-B6   
Discontinued 1 TAB PO Daily  1:00am 2019 12:56pm  
  
  
  
                                                    busPIRone hydrochloride   
15 mg oral tablet  
(20 sources)                                        Start: 2021  
End: 2024                         take 5 mg by mouth   
once daily at   
bedtime                                 Buspirone Discontinued   
5 MG PO Daily at   
bedtime 2021 12:00am 2024 2:07pm  
  
  
  
                                                    Start: 2021  
End: 2024                         take 1 tablet by mouth once   
daily in the morning                    Buspirone Discontinued 10 MG PO   
Every morning 2021   
11:00pm 2024 2:07pm   
Takes 10mg in AM, 5mg at night. Uses   
one 15mg tablet and breaks it.  
   
                                                    Start: 2019  
End: 2021                         take 7.5 mg by mouth twice   
daily                                   Buspirone Discontinued 7.5 MG PO   
Twice daily 2019 11:00pm   
2021 11:50pm  
   
                                        Start: 2018   take 2 tablets by mo  
ut in the   
morning, then take 1 tablet by   
mouth in the evening                    busPIRone HCl 5 MG 2 tablets in the   
AM Orally and 1 tablet in the PM 17   
May, 2018 Active  
   
                                Start: 2018                   
   
                                                      
End: 10-                                     busPIRone (Buspar) 15 mg tab  
let Take   
by mouth once daily as needed. 0   
10/10/2023 Discontinued (Therapy   
completed)  
  
  
  
                                                    cholecalciferol 0.05   
mg oral capsule  
(5 sources)               Vitamin D                 Start:   
2018  
End: 2019                         take 1   
capsule by   
mouth once                              Cholecalciferol   
(Vitamin D3)   
(Vitamin D3) 2,000   
unit Capsule   
Discontinued 2000   
UNIT PO Once 2018 12:00am   
2019   
11:56am  
   
                                                    clopidogrel 75 mg   
oral tablet  
(5 sources)                             P2Y12 Platelet   
Inhibitor                               Start:   
2018  
End: 2018                         take 75 mg by   
mouth once   
daily                                   Clopidogrel   
Discontinued 75 MG   
PO daily 2018 12:00am   
2018   
9:07am  
   
                                                    Durolane  
(20 sources)                                        Start:   
2020                                          Durolane 17 Aug,   
2020 60 mg  
   
                                                    ferrous sulfate 325   
mg oral tablet  
(5 sources)                                         Start:   
12-  
End: 2022                         take 1 tablet   
by mouth   
twice daily                             Ferrous Sulfate   
(Iron) 325 mg (65 mg   
iron) tablet   
Discontinued 325 MG   
PO Twice daily 60   
2021   
12:00am 2022 1:32pm  
   
                                                    Fish Oil & Vitamin D3  
(5 sources)                                         Start:   
2021  
End: 2022                         take 1   
capsule by   
mouth once   
daily                                   Fish Oil & Vitamin   
D3 Discontinued 1   
CAP PO Daily   
2021   
12:00am 2022 1:32pm  
  
  
  
                                                    Start: 2021  
End: 2022                         take 1 capsule by mouth once   
daily                                   Fish Oil & Vitamin D3 Discontinued 1   
CAP PO Daily 2021   
1:00am 2022 2:32pm  
  
  
  
                                                    hydrOXYzine pamoate   
50 mg oral capsule  
(5 sources)               Antihistamine             Start:   
2021  
End: 2022                         take 50 mg by   
mouth every   
six hours                               Hydroxyzine   
Pamoate   
Discontinued 50   
MG PO Q6H 60  12:00am   
2022 1:32pm  
   
                                                    LORazepam 0.5 mg   
oral tablet  
(5 sources)               Benzodiazepine            Start:   
2021  
End: 2022                         take 1 tablet   
by mouth   
twice daily                             Lorazepam   
(Ativan) 0.5 mg   
tablet   
Discontinued 0.5   
MG PO Twice daily   
14    
12:00am 2022 1:33pm  
   
                                                    losartan potassium   
100 mg oral tablet  
(20 sources)                            Angiotensin 2   
Receptor Blocker                        Start:   
2018  
End: 2024                         take 100 mg   
by mouth once   
daily in the   
morning                                 Losartan   
Discontinued 100   
MG PO Every   
morning 2018 12:00am   
2024   
2:12pm  
   
                                                    meclizine   
hydrochloride 25 mg   
oral tablet  
(5 sources)               Antiemetic                Start:   
2019  
End: 2021                         take 25 mg by   
mouth once   
daily                                   Meclizine   
Discontinued 25   
MG PO Daily 2019   
11:00pm 2021   
11:50pm  
   
                                                    raNITIdine 150 mg   
oral tablet  
(10 sources)                            Histamine-2 Receptor   
Antagonist                              Start:   
2018  
End: 2019                         take 150 mg   
by mouth once   
daily at   
bedtime                                 Ranitidine Hcl   
Discontinued 150   
MG PO Daily at   
bedtime 2018   
12:00am 2019   
11:56am  
  
  
  
                                                    Start: 2018  
End: 2019                         take 150 mg by mouth once daily   
at bedtime                              Ranitidine Hcl Discontinued 150 MG   
PO Daily at bedtime 2018 12:00am 2019 11:56am  
  
  
  
                                                    triamcinolone acetonide 40   
mg/ml injectable suspension  
(20 sources)    Corticosteroid  Start: 2022                 Kenalog-40    
20 mg  
  
  
  
                                Start: 2021                 Kenalog -40 mg  
 11 Aug, 2021 40 mg  
   
                                Start: 2020                 Kenalog -40 mg  
 05 Aug, 2020 40 mg  
   
                                Start: 2020                 KENALOG - 10 m  
g 05 Aug, 2020 40 mg  
   
                                Start: 09-                 Kenalog -40 mg  
 10 Sep, 2019 40 mg  
   
                                Start: 2018                 Kenalog -40 mg  
 16 May, 2018 10 mg  
   
                                Start: 2017                 Kenalog -40 mg  
  40 mg  
   
                                Start: 2012                 KENALOG - 10 m  
g 06 Sep, 2012 1.5 cc  
  
  
  
Problems  
Active Problems  
  
  
                      Problem Classification Problem    Date       Documented Da  
te Episodic/Chronic  
   
                                                    Acute myocardial   
infarction  
(5 sources)                             Acute non-ST segment   
elevation myocardial   
infarction;   
Translations:   
[Non-ST elevation   
(NSTEMI) myocardial   
infarction]                             2021          Chronic  
   
                                                    Anxiety disorders  
(20 sources)                            Anxiety;   
Translations:   
[Anxiety disorder,   
unspecified]                                                Chronic  
   
                                                    Cardiac dysrhythmias  
(20 sources)                            Chronic atrial   
fibrillation;   
Translations:   
[Unspecified atrial   
fibrillation]                           Onset:   
2019                Chronic  
   
                                                    Cardiac dysrhythmias  
(14 sources)                            Palpitations;   
Translations:   
[Palpitations]                          Onset:   
2023                Episodic  
   
                                                    Complication of   
device; implant or   
graft  
(5 sources)                             Arthropathy of knee   
joint; Translations:   
[Fibrosis due to   
internal orthopedic   
prosthetic devices,   
implants and grafts,   
initial encounter]                      2022          Episodic  
   
                                                    Coronary   
atherosclerosis and   
other heart disease  
(20 sources)                            Ischemic   
cardiomyopathy;   
Translations:   
[Atherosclerotic   
heart disease of   
native coronary   
artery without   
angina pectoris]                        Onset:   
2019                Chronic  
   
                                                    Coronary   
atherosclerosis and   
other heart disease  
(2 sources)                             Coronary angioplasty   
status;   
Translations:   
[Coronary   
angioplasty status]                     Onset:   
2023                                          Episodic  
   
                                                    Deficiency and other   
anemia  
(20 sources)                            Thalassemia;   
Translations:   
[Thalassemia,   
unspecified]                            2018          Chronic  
   
                                                    Deficiency and other   
anemia  
(1 source)                              Thalassemia,   
unspecified;   
Translations:   
[Thalassemia]                                               Chronic  
   
                                                    Deficiency and other   
anemia  
(5 sources)                             Anemia;   
Translations:   
[Anemia,   
unspecified]                            2018          Episodic  
   
                                                    Diabetes mellitus   
without complication  
(20 sources)                            Hyperglycemia;   
Translations:   
[Hyperglycemia,   
unspecified]                                                Episodic  
   
                                                    Diseases of mouth;   
excluding dental  
(1 source)                              Cellulitis and   
abscess of mouth;   
Translations:   
[CELLULITIS AND   
ABSCESS OF MOUTH]                       Onset:   
2019                                            
   
                                                    Diseases of white   
blood cells  
(20 sources)                            Leukopenia;   
Translations:   
[Decreased white   
blood cell count,   
unspecified]                                                Chronic  
   
                                                    Disorders of lipid   
metabolism  
(20 sources)                            Hyperlipidemia,   
unspecified;   
Translations:   
[Hyperlipidemia]                        Onset:   
01-                10-                Chronic  
   
                                                    Essential hypertension  
(20 sources)                            Essential (primary)   
hypertension;   
Translations:   
[Benign essential   
hypertension]                           Onset:   
03-                10-                Chronic  
   
                                                    External cause codes:   
Struck by; against  
(1 source)                              Walked into   
furniture, initial   
encounter;   
Translations:   
[WALKED INTO   
FURNITURE INITIAL   
ENC]                                    Onset:   
2019                                            
   
                                                    Fluid and electrolyte   
disorders  
(20 sources)                            Hypokalemia;   
Translations:   
[Hypokalemia]                                               Episodic  
   
                                                    Genitourinary symptoms   
and ill-defined   
conditions  
(20 sources)                            Blood in urine;   
Translations:   
[Hematuria]                                                 Episodic  
   
                                                    Headache; including   
migraine  
(1 source)                              Headache;   
Translations:   
[HEADACHE]                              Onset:   
2019                                          Episodic  
   
                                                    Hypertension with   
complications and   
secondary hypertension  
(3 sources)                             Hypertensive urgency   
; Translations:   
[Hypertensive   
urgency]                                Onset:   
2024                Chronic  
   
                                                    Malaise and fatigue  
(5 sources)                             Fatigue;   
Translations: [Other   
fatigue]                                2018          Episodic  
   
                                                    Menopausal disorders  
(20 sources)                            Menopause present;   
Translations:   
[Menopausal and   
female climacteric   
states]                                                     Chronic  
   
                                                    Nonspecific chest pain  
(6 sources)                             Chest pain,   
unspecified;   
Translations: [Chest   
pain, unspecified]                      Onset:   
2018                                          Episodic  
   
                                                    Nutritional   
deficiencies  
(20 sources)                            Vitamin D   
deficiency;   
Translations:   
[Vitamin D   
deficiency,   
unspecified]                                                Chronic  
   
                                                    Osteoarthritis  
(20 sources)                            Osteoarthritis of   
knee; Translations:   
[Osteoarthritis of   
knee, unspecified]                      Onset:   
2021  
Resolved:   
09-                                          Chronic  
   
                                                    Osteoporosis  
(20 sources)                            Osteoporosis;   
Translations:   
[Osteoporosis]                                              Chronic  
   
                                                    Other aftercare  
(1 source)                              Long term (current)   
use of   
anticoagulants;   
Translations: [LONG   
TERM CURRNT USE   
ANTICOAGULANTS]                         Onset:   
2019                                          Episodic  
   
                                                    Other aftercare  
(20 sources)                            Drug therapy   
finding;   
Translations:   
[Long-term (current)   
use of   
anticoagulants]                         Onset:   
2023                Episodic  
   
                                                    Other aftercare  
(2 sources)                             Taking high risk   
medication;   
Translations: [Other   
long term (current)   
drug therapy]                           Onset:   
09-                10-                Episodic  
   
                                                    Other aftercare  
(2 sources)                             Other long term   
(current) drug   
therapy;   
Translations: [Other   
long term (current)   
drug therapy]                           Onset:   
2023                                          Episodic  
   
                                                    Other connective   
tissue disease  
(20 sources)                            History of total   
knee arthroplasty;   
Translations:   
[Presence of left   
artificial knee   
joint]                                  12-          Chronic  
   
                                                    Other connective   
tissue disease  
(7 sources)                             Presence of left   
artificial knee   
joint                                   Onset:   
2021  
Resolved:   
06-                                          Chronic  
   
                                                    Other connective   
tissue disease  
(3 sources)                             Trigger finger,   
right middle finger                     Onset:   
2022  
Resolved:   
2022                                          Episodic  
   
                                                    Other connective   
tissue disease  
(3 sources)               Pain in right hand        Onset:   
2022  
Resolved:   
2022                                          Episodic  
   
                                                    Other connective   
tissue disease  
(2 sources)                             Trigger finger,   
right index finger                                          Episodic  
   
                                                    Other lower   
respiratory disease  
(15 sources)                            Dyspnea on exertion;   
Translations:   
[Shortness of   
breath]                                 Onset:   
09-                10-                Episodic  
   
                                                    Other lower   
respiratory disease  
(5 sources)                             Dyspnea;   
Translations:   
[Dyspnea,   
unspecified]                            2018          Episodic  
   
                                                    Other lower   
respiratory disease  
(2 sources)                             Other forms of   
dyspnea;   
Translations: [Other   
forms of dyspnea]                       Onset:   
10-                                          Episodic  
   
                                                    Other non-traumatic   
joint disorders  
(20 sources)                            Arthralgia of the   
lower leg;   
Translations: [Knee   
pain, right]                                                Episodic  
   
                                                    Other non-traumatic   
joint disorders  
(4 sources)                             Stiffness of left   
knee, not elsewhere   
classified                              Onset:   
2022  
Resolved:   
06-                                          Episodic  
   
                                                    Other screening for   
suspected conditions   
(not mental disorders   
or infectious disease)  
(2 sources)                             Raised cardiac   
enzyme or marker;   
Translations: [Other   
specified abnormal   
findings of blood   
chemistry]                              2024          Episodic  
   
                                                    Other upper   
respiratory infections  
(5 sources)                             Upper respiratory   
infection;   
Translations: [Acute   
upper respiratory   
infection,   
unspecified]                            2022          Episodic  
   
                                                    Residual codes;   
unclassified  
(13 sources)                            Body mass index   
20-24 - normal;   
Translations: [Body   
Mass Index between   
19-24, adult]                                               Episodic  
   
                                                    Residual codes;   
unclassified  
(5 sources)                             Other specified   
postprocedural   
states                                                      Episodic  
   
                                                    Superficial injury;   
contusion  
(1 source)                              Contusion of left   
front wall of   
thorax, initial   
encounter;   
Translations:   
[CONTUS LT FRONT   
WALL THORAX INITIAL]                    Onset:   
2019                                          Episodic  
   
                                                    Syncope  
(3 sources)                             Near syncope;   
Translations:   
[Syncope and   
collapse]                               Onset:   
2024                Episodic  
   
                                                    Unclassified  
(1 source)                              Other long term   
(current) drug   
therapy                                 Onset:   
2019                                            
   
                                                    Unclassified  
(2 sources)                             Chronic atrial   
fibrillation,   
unspecified;   
Translations:   
[Chronic atrial   
fibrillation,   
unspecified]                            Onset:   
2023                                            
   
                                                    Viral infection  
(5 sources)                             Viral disease;   
Translations: [Viral   
infection,   
unspecified]                            2022          Episodic  
  
  
Past or Other Problems  
  
  
                      Problem Classification Problem    Date       Documented Da  
te Episodic/Chronic  
   
                                                    Conditions associated   
with dizziness or   
vertigo  
(5 sources)                             Dizziness and   
giddiness;   
Translations:   
[Dizziness and   
giddiness]                              Onset:   
2019                                          Episodic  
   
                                                    Disorders of teeth and   
jaw  
(1 source)                              Dental caries,   
unspecified;   
Translations:   
[DENTAL CARIES   
UNSPECIFIED]                            Onset:   
2019                                          Episodic  
   
                                                    Other non-traumatic   
joint disorders  
(4 sources)               Pain in left knee         Onset:   
2021  
Resolved:   
2022                                          Episodic  
   
                                                    Other skin disorders  
(3 sources)                             Localized   
swelling, mass   
and lump, head;   
Translations:   
[LOCALIZED   
SWELLING MASS AND   
LUMP HEAD]                              Onset:   
2019                                          Episodic  
   
                                                    Unclassified  
(13 sources)                            Never smoked   
tobacco;   
Translations:   
[Never a smoker]                                              
   
                                                    Unclassified  
(1 source)                                          Onset:   
10-                10-                  
  
  
  
Results  
  
  
                          Test Name    Value        Interpretation Reference   
[facility]                                   Facility  
   
                                                    Patient Letteron 2024   
   
                                        Patient Letter      [id number]  
[id number]  
[id number]  
IFF                 Normal                                  [facility]  
   
                                                    Outside Recordson 2024  
   
   
                                        Outside Records     [id number]  
[id number]  
[id number]  
IFF                 Normal                                  [facility]  
   
                                                    Acanthocytes [Presence] in B  
lood by Light microscopyOrdered By: Yue Blackwell on   
2024   
   
                                                    Acanthocytes LM Ql   
(Bld)           Moderate                                        [facility]  
   
                                                    Anisocytosis LM Ql (Bld)Orde  
red By: Yue Blackwell on 2024   
   
                      Anisocytosis Ql (Bld) Moderate                         [facility]  
   
                                                    Basic Metabolic Panelon    
   
                      Anion gap [Moles/Vol] 10.5 mmol/L Normal     6.0-15.0   [facility]  
   
                                        Comment on above:   Performed By: #### C  
MP, MG ####  
[facility]  
[address]   
   
                      Calcium [Mass/Vol] 9.0 mg/dL  Normal     8.6-10.3   [facility]  
   
                                        Comment on above:   Performed By: #### C  
MP, MG ####  
[facility]  
[address] [place] [address]   
   
                      Chloride [Moles/Vol] 106 mmol/L Normal          [facility]  
   
                                        Comment on above:   Performed By: #### C  
MP, MG ####  
[facility]  
[id number] [facility]   
   
                      CO2 [Moles/Vol] 26.1 mmol/L Normal     21.0-31.0  [facility]  
   
                                        Comment on above:   Performed By: #### C  
MP, MG ####  
[facility]  
[address] [id number] [place]   
   
                      Creatinine [Mass/Vol] 0.94 mg/dL Normal     0.60-1.20  [facility]  
   
                                        Comment on above:   Performed By: #### C  
MP, MG ####  
[facility]  
[address] [id number] [place]   
   
                                                    Creatinine Clr Calc   
Pharmacy        43.67           [facility]  
   
                                        Comment on above:   Result Comment: PERF  
ORMED BY:  
[place] [address]  
[phone]  
PATHOLOGIST MEDICAL DIRECTOR  
LILIA ROCHA M.D.   
   
                                                            Performed By: #### C  
MP, MG ####  
[place] [address] [place]   
   
                                                    GFR/1.73 sq   
M.predicted MDRD   
(S/P/Bld) [Vol   
rate/Area]      mL/min/{1.73_m2} [facility]  
   
                                        Comment on above:   Performed By: #### C  
MP, MG ####  
[address]   
   
                      Glucose [Mass/Vol] 90 mg/dL   Normal          [facility]  
   
                                        Comment on above:   Result Comment: Rand  
 Glucose Reference Range is dependent on  
   
time and  
content of last meal. Glucose of more than 200 mg/dL in a  
nonstressed, ambulatory subject supports the diagnosis of  
Diabetes Mellitus.  
ADA recommended reference range   
   
                                                            Performed By: #### C  
MP, MG ####  
[address]   
   
                      Potassium [Moles/Vol] 3.6 mmol/L Normal     3.5-5.1    [facility]  
   
                                        Comment on above:   Performed By: #### C  
MP, MG ####  
[address]   
   
                      Sodium [Moles/Vol] 139 mmol/L Normal     136-145    [facility]  
   
                                        Comment on above:   Performed By: #### C  
MP, MG ####  
[place] [address] [place]   
   
                                                    Urea nitrogen   
[Mass/Vol]      11 mg/dL        Normal          7-25            [facility]  
   
                                        Comment on above:   Performed By: #### C  
MP, MG ####  
[place] [address] [place]   
   
                                                    Basophils Auto (Bld) [#/Vol]  
Ordered By: Yue Blackwell on 2024   
   
                                                    Basophils (Bld)   
[#/Vol]         0.1 10*3/uL                     0.0-0.2         [facility]  
   
                                                    Basophils/100 WBC Auto (Bld)  
Ordered By: Yue Blackwell on 2024   
   
                                                    Basophils/100 WBC   
(Bld)           0.9 %                           .               [facility]  
   
                                                    [place] cells [Presence] in Blo  
od by Light microscopyOrdered By: Yue Blackwell on   
2024   
   
                      Jace cells LM Ql (Bld) Moderate                         Fi  
[facility]  
   
                                                    Calcium [Mass/volume] in Ser  
um or PlasmaOrdered By: Yue Blackwell on 2024   
   
                      Calcium [Mass/Vol] 9.0 mg/dL             8.6-10.3   [facility]  
   
                                                    Carbon dioxide, total [Moles  
/volume] in Serum or PlasmaOrdered By: Yue Blackwell   
on   
2024   
   
                      CO2 [Moles/Vol] 26.1 mmol/L            21.0-31.0  [facility]  
   
                                                    Chloride [Moles/volume] in S  
judson or PlasmaOrdered By: Yue Blackwell on 2024   
   
                      Chloride [Moles/Vol] 106 mmol/L                 [facility]  
   
                                                    Creatinine [Mass/volume] in   
Serum or PlasmaOrdered By: Yue Blackwell on 2024  
   
   
                      Creatinine [Mass/Vol] 0.94 mg/dL            0.60-1.20  [facility]  
   
                                                    ECH echo transthoracicon    
   
                                        ECH echo transthoracic [facility] [place] [address]  
  
Echocardiogram  
Signed  
  
Patient: Jessica Quick   
MR#: [id number]  
[id number]  
: 1943   
Acct:[id number]  
  
Age/Sex: 80 / F ADM Date:   
24  
  
Loc:  Room: [address] [place] [address]   
Type: ADM INOo  
Attending Dr: Hermann Roque DO  
  
Ordering Provider: STEVE Mcconnell  
Date of Service:   
241130  
Accession #:   
([id number]) ECH/ECH   
echo transthoracic:   
Presyncope-will discharge   
today  
  
  
Copies to: MD Yue Ugarte, APRDORA  
  
  
BSA: 1.7 m2 BP: 172/104   
mmHg  
HR: 65  
Reason For Study:   
Presyncope  
History: A-Fib., CAD,   
HLD, HTN, Stents  
  
Interpretation Summary  
Left ventricular systolic   
function is normal.  
Ejection Fraction =   
55-60%.  
Mild concentric left   
ventricular hypertrophy.  
The left ventricular wall   
motion is normal.  
The left atrium appears   
severely dilated.  
There is mild tricuspid   
regurgitation.  
There is trace mitral   
regurgitation.  
  
Procedure/Quality: A   
two-dimensional   
transthoracic   
echocardiogram with color  
flow and Doppler was   
performed. The study was   
technically good in   
quality.  
Left Ventricle: Mild   
concentric left   
ventricular hypertrophy.   
Left  
ventricular systolic   
function is normal.   
Ejection Fraction =   
55-60%. A variety  
of Doppler measurements   
indicate normal left   
ventricular diastolic   
function.  
The left ventricular wall   
motion is normal.  
Left Atrium: The left   
atrium appears severely   
dilated.  
Right Atrium: The right   
atrium appears normal in   
size.  
Right Ventricle: The   
right ventricular size,   
thickness and function   
are  
normal.  
Aortic Valve: The aortic   
valve is mildly   
sclerotic. The aortic   
valve is  
trileaflet. No   
hemodynamically   
significant valvular   
aortic stenosis. No   
aortic  
regurgitation is present.  
Mitral Valve: The mitral   
valve is mildly   
sclerotic. There is no   
mitral valve  
stenosis. There is trace   
mitral regurgitation.  
Tricuspid Valve: The   
tricuspid valve is   
normal. There is mild   
tricuspid  
regurgitation.  
Pulmonic Valve: The   
pulmonic valve is not   
well seen, but is grossly   
normal.  
Mild pulmonic valvular   
regurgitation.  
Arteries: The aortic root   
is normal size.  
Pericardium/Pleura: No   
pericardial effusion   
seen.  
IVC/Hepatic Viens: The   
inferior vena cava is   
normal in size, with a   
normal  
collapsibility index.  
Miscellaneous: No   
thrombus, vegetation or   
mass is seen.  
Measurements with Normals  
IVSd: 1.2 cm (0.7-1.1   
cm)LVIDd: 4.5 cm (3.7-5.4   
cm)  
LVPWd: 1.2 cm (0.7-1.1   
cm)LVIDs: 2.9 cm (2.3-3.6   
cm)  
LA dimension: 4.8 cm   
(2.3-4.0 cm)Ao root diam:   
3.3 cm(2.0-3.6 cm)  
asc Aorta Diam: 4.0   
cm(2.1-3.4cm)  
  
Doppler with Normals  
RVSP(TR): 38.8 mmHg   
(18-35mmHg)  
LV V1 max: 109.1   
cm/sec(0.7-1.7m/s)MV E   
max fito: 114.0   
cm/sec(0.8-1.3m/s)  
  
MMode/2D Measurements   
Calculations  
RVDd: 3.5 cm FS: 36.2 %   
Ao root area: LVOT diam:   
2.1 cm  
TAPSE: 2.2 cm EDV(Teich):   
91.1 ml 8.8 cm2 LVOT   
area: 3.4 cm2  
RV S Fito: ESV(Teich):   
30.9 ml  
12.1 cm/sec EF(Teich):   
66.0 %  
__  
  
LVLd ap4: 7.0 cm   
SV(MOD-sp4): 44.6 ml   
LAV(MOD-sp4): LA A2 area:  
EDV(MOD-sp4): 106.0 ml   
29.0 cm2  
80.7 ml LAV(MOD-sp2): LA   
A4 area:  
LVLs ap4: 5.6 cm 93.5 ml  
ESV(MOD-sp4): 31.6 cm2  
36.1 ml LA length (vol):  
EF(MOD-sp4): 7.5 cm  
55.3 % LA vol: 104.1 ml  
LA vol index:  
62.0 ml/m2  
__  
  
RA Volume: 45.4 mlRA   
Volume Index:  
27.0 ml/m2  
  
Doppler Measurements   
Calculations  
MV dec time: E/E' lat:   
11.8 MV dec slope: Ao V2   
max:  
0.21 sec E/E' med: 15.0   
154.2 cm/sec  
544.1 cm/sec2 Ao max P.5 mmHg  
Ao mean P.9 mmHg  
Ao V2 mean:  
117.5 cm/sec  
Ao V2 VTI: 31.3 cm  
  
CHIP(I,D): 2.3 cm2  
CHIP(V,D): 2.4 cm2  
  
__  
LV V1 max PG: TV max PG:   
TR max fito:  
4.8 mmHg 34.0 mmHg 290.5   
cm/sec  
LV V1 mean PG: TR max P.8 mmHg  
2.6 mmHg RAP systole: 5.0   
mmHg  
LV V1 mean:  
77.6 cm/sec  
LV V1 VTI: 21.7 cm  
  
_________________________  
___  
Electronically signed by:   
Chris Macdonald on   
2024 06:04 PM  
  
  
  
  
  
Transcribed By: HARLEEN  
Performed At: 24   
1255  
Signed By: Chris Macdonald MD   
24 1804       Normal                                  [facility]  
   
                                                    Eosinophils Auto (Bld) [#/Vo  
l]Ordered By: Yue Blackwell on 2024   
   
                                                    Eosinophils (Bld)   
[#/Vol]         0.0 10*3/uL                     0.0-0.45        [facility]  
   
                                                    Eosinophils/100 WBC Auto (Bl  
d)Ordered By: Yue Blackwell on 2024   
   
                                                    Eosinophils/100 WBC   
(Bld)           0.3 %                           .               [facility]  
   
                                                    Erythrocyte distribution wid  
th Auto (RBC) [Ratio]Ordered By: Yue Blackwell on   
2024   
   
                                                    Erythrocyte   
distribution width   
(RBC) [Ratio]   16.3 %                          11.9-15.3       [facility]  
   
                                                    Glucose [Mass/volume] in Ser  
um or PlasmaOrdered By: Yue Blackwell on 2024   
   
                      Glucose [Mass/Vol] 90 mg/dL                   [facility]  
   
                                        Comment on above:   ADA recommended refe  
rence rangeRandom Glucose Reference Range   
is dependent on time and content of last meal. Glucose of more   
than 200 mg/dL in a nonstressed, ambulatory subject supports   
the diagnosis of Diabetes Mellitus.   
   
                                                    Hematocrit Auto (Bld) [Volum  
e fraction]Ordered By: Yue Blackwell on 2024   
   
                                                    Hematocrit (Bld)   
[Volume fraction] 29.4 %                          34.0-46.4       [facility]  
   
                                                    Hemoglobin [Mass/volume] in   
BloodOrdered By: Yue Blackwell on 2024   
   
                                                    Hemoglobin (Bld)   
[Mass/Vol]      9.3 g/dL                        11.8-15.4       [facility]  
   
                                                    Hypochromia LM Ql (Bld)Order  
ed By: Yue Blackwell on 2024   
   
                      Hypochromia Ql (Bld) Moderate                         [facility]  
   
                                                    INR in Platelet poor plasma   
by Coagulation assayOrdered By: Yue Blackwell on   
2024   
   
                                                    INR Coag (PPP)   
[Relative time] 1.7 {INR}                                       [facility]  
   
                                        Comment on above:   INR Therapeutic Rang  
e A) Pre- and Peroperative OAT started two  
  
weeks before surgery. NOT HIP SURGERY: 1.5 - 2.5 HIP SURGERY: 2   
- 3B) Primary and secondary prevention of venous THROMBOSIS: 2   
- 3C) Active venous thrombosis, pulmonary embolismand   
prevention of recurrent venous thrombosis: 2 - 3D) Prevention   
of arterial thromboembolismincluding patients with mechanical   
heart valves: 3 - 4.5   
   
                                                    [place] 2024   
   
                                        L                   --------------------  
-----  
-------------------------  
-------------------------  
-----------------  
Specimen: [id number]   
Received:    
Status: MELLISA Sullivan Num:   
[id number]  
Spec Type: Impression   
Subm Dr: Hermann Roque DO  
  
Tissues: PATHPER  
Procedures: PATHREVIEW  
-------------------------  
-----------------  
Age/  
Patient Birth Sex   
Location Account   
Attending Physician  
-------------------------  
-----------------  
Jessica Quick 80/F 3T   
[id number] Hermann Roque DO  
-------------------------  
-----------------  
  
SPEC NUM: [id number] RECD:   
 STATUS:   
MELLISA KODI NUM: [id number]  
CATA: 24-3 [facility]   
DR: Hermann Roque DO  
  
ENTERED:    
SILVANO DR:  
  
SPEC TYPE: Impression   
DEPT: [place]  
ENTERED BY: [id number] RECV   
BY: [id number]  
  
ORDERED: PATHREVIEW  
ORDERED: PATHREVIEW  
  
Pathologist Review  
  
Abnormal CBC for   
peripheral blood smear   
review per clinician's   
request:  
- Mild anemia with severe   
microcytic type,   
including mild   
anisocytosis and  
poikilocytosis with   
moderate hypochromasia,   
moderate microcytosis,   
few ovalocytes,  
occasional target cells,   
occasional crenated and   
spur cells and the   
related schistocytes,  
and rare teardrop cells   
and at least rare   
polychromatophils  
  
- No obvious   
morphological abnormality   
of the leukocyte   
population  
  
- Occasional large   
platelets  
  
Comment  
-The overall finding is   
compatible with the   
stated history of beta   
thalassemia minor  
-There is interval mild   
drop of the hemoglobin   
level since last path   
review at P20?125  
-Continuous routine   
laboratory follow-up is   
also suggested  
  
CPT: 64659  
-------------------------  
-----------------  
  
  
  
  
  
  
  
  
-------------------------  
-----------------  
Specimen:    
Received:    
Status: MELLISA Sullivan Num:   
[id number]  
Spec Type: Impression   
Subm Dr: Hermann Roque DO  
  
Tissues: PATHPER  
Procedures: PATHREVIEW  
-------------------------  
-----------------  
Patient: Jessica Quick   
[id number] (Continued)  
-------------------------  
-----------------  
  
  
Signed   
__________(signature on   
file)___________   
Kacie Moe MD   
01/10/24 0859       Normal                                  [facility]  
   
                                                    Leukocytes [#/volume] correc  
tamir for nucleated erythrocytes in Blood by Automated  
   
counOrdered By: Yue Blackwell on 2024   
   
                                                    WBC corrected for nucl   
RBC Auto (Bld) [#/Vol] 6.5 10*3/uL                     3.8-11.6        [facility]  
   
                                                    Lymphocytes Auto (Bld) [#/Vo  
l]Ordered By: Yue Blackwell on 2024   
   
                                                    Lymphocytes (Bld)   
[#/Vol]         1.5 10*3/uL                     1.00-4.8        [facility]  
   
                                                    Lymphocytes/100 WBC Auto (Bl  
d)Ordered By: Yue Blackwell on 2024   
   
                                                    Lymphocytes/100 WBC   
(Bld)           23.1 %                          .               [facility]  
   
                                                    MCH Auto (RBC) [Entitic mass  
]Ordered By: Yue Blackwell on 2024   
   
                                                    MCH (RBC) [Entitic   
mass]           20.9 pg                         24.7-34.3       [facility]  
   
                                                    MCHC Auto (RBC) [Mass/Vol]Or  
dered By: Yue Blackwell on 2024   
   
                      MCHC (RBC) [Mass/Vol] 31.7 g/dL             32.0-35.0  [facility]  
   
                                                    MCV Auto (RBC) [Entitic vol]  
Ordered By: Yue Blackwell on 2024   
   
                                                    MCV (RBC) [Entitic   
vol]            66.1 fL                                   [facility]  
   
                                                    Microcytes LM Ql (Bld)Ordere  
d By: Yue Blackwell on 2024   
   
                      Microcytes Ql (Bld) Marked                           [facility]  
   
                                                    Monocytes Auto (Bld) [#/Vol]  
Ordered By: Yue Blackwell on 2024   
   
                                                    Monocytes (Bld)   
[#/Vol]         0.5 10*3/uL                     0.0-0.8         [facility]  
   
                                                    Monocytes/100 WBC Auto (Bld)  
Ordered By: Yue Blackwell on 2024   
   
                                                    Monocytes/100 WBC   
(Bld)           7.6 %                           .               [facility]  
   
                                                    Neutrophils Auto (Bld) [#/Vo  
l]Ordered By: Yue Blackwell on 2024   
   
                                                    Neutrophils (Bld)   
[#/Vol]         4.4 10*3/uL                     1.8-7.7         [facility]  
   
                                                    Neutrophils/100 WBC Auto (Bl  
d)Ordered By: Yue Blackwell on 2024   
   
                                                    Neutrophils/100 WBC   
(Bld)           68.1 %                          .               [facility]  
   
                                                    No Panel InformationOrdered   
By: Yue Blackwell on 2024   
   
                                                    Estimated GFR   
(CKD-EPI)       > 60.0 mL/Min                                   [facility]  
   
                                                    Pharmacy Creatinine   
Clearance (Chem 43.67                                           [facility]  
   
                                                    Slides for Pathologist   
Review          N/A                                             [facility]  
   
                                                    Nucleated erythrocytes [Pres  
ence] in Blood by Automated countOrdered By: Yue Blackwell   
on 2024   
   
                                                    Nucleated RBC Auto Ql   
(Bld)           0.1 /100{WBC}                   0-0.5           [facility]  
   
                                                    Ovalocyte detectionOrdered B  
y: Yue Blackwell on 2024   
   
                      Ovalocytes LM Ql (Bld) Moderate                         Fi  
[facility]  
   
                                                    Platelet adequacy [Presence]  
 in Blood by Light microscopyOrdered By: Yue Blackwell  
 on   
2024   
   
                      Platelets LM Ql (Bld) Normal                Normal     [facility]  
   
                                                    Platelet mean volume Auto (B  
ld) [Entitic vol]Ordered By: Yue Blackwell on   
2024   
   
                                                    Platelet mean volume   
(Bld) [Entitic vol] 7.9 fL                          6.3-10.7        [facility]  
   
                                                    Platelet morphology finding   
[Identifier] in BloodOrdered By: Yue Blackwell on   
2024   
   
                                                    Platelet morphology   
finding Nom (Bld) Normal                          Normal          [facility]  
   
                                                    Platelets Auto (Bld) [#/Vol]  
Ordered By: Yue Blackwell on 2024   
   
                                                    Platelets (Bld)   
[#/Vol]         291 10*3/uL                     150-450         [facility]  
   
                                                    Poikilocytosis [Presence] in  
 Blood by Light microscopyOrdered By: Yue Blackwell on  
   
2024   
   
                                                    Poikilocytosis LM Ql   
(Bld)           Moderate                                        [facility]  
   
                                                    Polychromasia [Presence] in   
Blood by Light microscopyOrdered By: Yue Blackwell on   
2024   
   
                                                    Polychromasia LM Ql   
(Bld)           Marked                                          [facility]  
   
                                                    Potassium [Moles/volume] in   
Serum or PlasmaOrdered By: Yue Blackwell on 2024  
   
   
                      Potassium [Moles/Vol] 3.6 mmol/L            3.5-5.1    [facility]  
   
                                                    Prothrombin Time INRon    
   
                                                    INR Coag (PPP)   
[Relative time] 1.7 {INR}       Normal                          [facility]  
   
                                        Comment on above:   Result Comment: INR   
Therapeutic Range  
A) Pre- and Peroperative OAT started two weeks before  
surgery. NOT HIP SURGERY: 1.5 - 2.5  
HIP SURGERY: 2 - 3  
B) Primary and secondary prevention of venous  
THROMBOSIS: 2 - 3  
C) Active venous thrombosis, pulmonary embolism  
and prevention of recurrent venous thrombosis: 2 - 3  
D) Prevention of arterial thromboembolism  
including patients with mechanical heart valves: 3 - 4.5  
PERFORMED BY:  
[place] [address]  
[phone]  
PATHOLOGIST MEDICAL DIRECTOR  
LILIA ROCHA M.D.   
   
                                                            Performed By: #### C  
MP, MG ####  
[address]   
   
                      PT Coag (PPP) [Time] 19.5 s     High       9.0-12.9   [facility]  
   
                                        Comment on above:   Result Comment: A he  
matocrit value greater than 55% may lead   
to   
inaccurate  
results in coagulation testing. Patients having hematocrit  
values >55% require a special collection tube for  
coagulation studies.  
Please contact the laboratory at [phone] for redraw  
instructions.   
   
                                                            Performed By: #### C  
MP, MG ####  
[facility]  
[address]   
   
                                                    Prothrombin time (PT)Ordered  
 By: Yue Blackwell on 2024   
   
                      PT Coag (PPP) [Time] 19.5 s                9.0-12.9   [facility]  
   
                                        Comment on above:   A hematocrit value g  
reater than 55% may lead to inaccurate   
results in coagulation testing. Patients having hematocrit   
values >55% require a special collection tube for coagulation   
studies. Please contact the laboratory at [phone] for   
redraw instructions.   
   
                                                    RBC Auto (Bld) [#/Vol]Ordere  
d By: Yue Blackwell on 2024   
   
                      RBC (Bld) [#/Vol] 4.45 10*6/uL            3.60-5.00  [facility]  
   
                                                    RBC morphologyOrdered By: Hayley Blackwell on 2024   
   
                                                    RBC morphology finding   
Nom (Bld)       N/A                                             [facility]  
   
                                                    Scan and CBCon 2024   
   
                      Acanthocytes Moderate   Normal                [facility]  
   
                                        Comment on above:   Performed By: #### C  
MP, MG ####  
[facility]  
[address]   
   
                      Anisocytosis Ql (Bld) Moderate   Normal                [facility]  
   
                                        Comment on above:   Performed By: #### C  
MP, MG ####  
[facility]  
[address] [place] [id number] [place]   
   
                                                    Basophils (Bld)   
[#/Vol]         0.1 10*3/uL     Normal          0.0-0.2         [facility]  
   
                                        Comment on above:   Performed By: #### C  
MP, MG ####  
[facility]  
[address] [id number] [place]   
   
                                                    Basophils/100 WBC   
(Bld)           0.9 %           Normal          .               [facility]  
   
                                        Comment on above:   Performed By: #### C  
MP, MG ####  
[address]   
   
                      Crenated RBC Moderate   Normal                [facility]  
   
                                        Comment on above:   Performed By: #### C  
MP, MG ####  
[address]   
   
                                                    Eosinophils (Bld)   
[#/Vol]         0.0 10*3/uL     Normal          0.0-0.45        [facility]  
   
                                        Comment on above:   Performed By: #### C  
MP, MG ####  
[address]   
   
                                                    Eosinophils/100 WBC   
(Bld)           0.3 %           Normal          .               [facility]  
   
                                        Comment on above:   Performed By: #### C  
MP, MG ####  
[address]   
   
                                                    Erythrocyte   
distribution width   
(RBC) [Ratio]   16.3 %          High            11.9-15.3       [facility]  
   
                                        Comment on above:   Performed By: #### C  
MP, MG ####  
[address]   
   
                                                    Hematocrit (Bld)   
[Volume fraction] 29.4 %          Low             34.0-46.4       [facility]  
   
                                        Comment on above:   Performed By: #### C  
MP, MG ####  
[address]   
   
                                                    Hemoglobin (Bld)   
[Mass/Vol]      9.3 g/dL        Low             11.8-15.4       [facility]  
   
                                        Comment on above:   Performed By: #### C  
MP, MG ####  
[address]   
   
                      Hypochromasia Moderate   Normal                [facility]  
   
                                        Comment on above:   Performed By: #### C  
MP, MG ####  
[address]   
   
                                                    Lymphocytes (Bld)   
[#/Vol]         1.5 10*3/uL     Normal          1.00-4.8        [facility]  
   
                                        Comment on above:   Performed By: #### C  
MP, MG ####  
[address]   
   
                                                    Lymphocytes/100 WBC   
(Bld)           23.1 %          Normal          .               [facility]  
   
                                        Comment on above:   Performed By: #### C  
MP, MG ####  
[address]   
   
                                                    MCH (RBC) [Entitic   
mass]           20.9 pg         Low             24.7-34.3       [facility]  
   
                                        Comment on above:   Performed By: #### C  
MP, MG ####  
[facility]  
[address] [place] [address]   
   
                                                    MCV (RBC) [Entitic   
vol]            66.1 fL         Low                       [facility]  
   
                                        Comment on above:   Performed By: #### C  
MP, MG ####  
[address]   
   
                                                    Mean Corpuscular HGB   
Conc            31.7 g/dL       Low             32.0-35.0       [facility]  
   
                                        Comment on above:   Performed By: #### C  
MP, MG ####  
[address]   
   
                      Microcytosis Marked     Normal                [facility]  
   
                                        Comment on above:   Performed By: #### C  
MP, MG ####  
[address]   
   
                                                    Monocytes (Bld)   
[#/Vol]         0.5 10*3/uL     Normal          0.0-0.8         [facility]  
   
                                        Comment on above:   Performed By: #### C  
MP, MG ####  
[address]   
   
                                                    Monocytes/100 WBC   
(Bld)           7.6 %           Normal          .               [facility]  
   
                                        Comment on above:   Performed By: #### C  
MP, MG ####  
[address]   
   
                                                    Neutrophils (Bld)   
[#/Vol]         4.4 10*3/uL     Normal          1.8-7.7         [facility]  
   
                                        Comment on above:   Performed By: #### C  
MP, MG ####  
[address]   
   
                                                    Neutrophils/100 WBC   
(Bld)           68.1 %          Normal          .               [facility]  
   
                                        Comment on above:   Performed By: #### C  
MP, MG ####  
[address]   
   
                      NRBC%      0.1 /100{WBC} Normal     0-0.5      [facility]  
   
                                        Comment on above:   Performed By: #### C  
MP, MG ####  
[address]   
   
                      Ovalocytes Moderate   Normal                [facility]  
   
                                        Comment on above:   Performed By: #### C  
MP, MG ####  
[address]   
   
                      Platelet Estimate Normal     Normal     Normal     [facility]  
   
                                        Comment on above:   Performed By: #### C  
MP, MG ####  
[address]   
   
                                                    Platelet mean volume   
(Bld) [Entitic vol] 7.9 fL          Normal          6.3-10.7        [facility]  
   
                                        Comment on above:   Performed By: #### C  
MP, MG ####  
[address]   
   
                      Platelet Morphology Normal     Normal     Normal     [facility]  
   
                                        Comment on above:   Result Comment: PERF  
ORMED BY:  
[place] [address]  
[phone]  
PATHOLOGIST MEDICAL DIRECTOR  
LILIA ROCHA M.D.   
   
                                                            Performed By: #### C  
MP, MG ####  
[address]   
   
                                                    Platelets (Bld)   
[#/Vol]         291 10*3/uL     Normal          150-450         [facility]  
   
                                        Comment on above:   Performed By: #### C  
MP, MG ####  
[address]   
   
                      Poikilocytosis Moderate   Normal                [facility]  
   
                                        Comment on above:   Performed By: #### C  
MP, MG ####  
[address]   
   
                      Polychromasia Marked     Normal                [facility]  
   
                                        Comment on above:   Performed By: #### C  
MP, MG ####  
[address]   
   
                      RBC (Bld) [#/Vol] 4.45 10*6/uL Normal     3.60-5.00  [facility]  
   
                                        Comment on above:   Performed By: #### C  
MP, MG ####  
[facility]  
[address] [place] [address] [place]   
   
                      Schistocytes Slight     Normal                [facility]  
   
                                        Comment on above:   Performed By: #### C  
MP, MG ####  
[facility]  
[address] [place] [address]   
   
                      Target Cells Slight     Normal                [facility]  
   
                                        Comment on above:   Performed By: #### C  
MP, MG ####  
[facility]  
[address] [place] [address]   
   
                      WBC (Bld) [#/Vol] 6.5 10*3/uL Normal     3.8-11.6   [facility]  
   
                                        Comment on above:   Performed By: #### C  
MP, MG ####  
[address]   
   
                                                    Schistocytes [Presence] in B  
lood by Light microscopyOrdered By: Yue Blackwell on   
2024   
   
                                                    Schistocytes LM Ql   
(Bld)           [facility]  
   
                                                    Serum or plasma anion gap de  
terminationOrdered By: Yue Blackwell on 2024   
   
                      Anion gap [Moles/Vol] 10.5 mmol/L            6.0-15.0   [facility]  
   
                                                    Sodium [Moles/volume] in Ser  
um or PlasmaOrdered By: Yue Blackwell on 2024   
   
                      Sodium [Moles/Vol] 139 mmol/L            136-145    [facility]  
   
                                                    Target cellsOrdered By: Kimi Blackwell on 2024   
   
                                                    Target cells LM Ql   
(Bld)           [facility]  
   
                                                    Troponin I High Sensitivityo  
n 2024   
   
                                                    Troponin I High   
Sensitivity     29.2 pg/mL      High            0.0-15.0        [facility]  
   
                                        Comment on above:   Order Comment: Comme  
nt Add on   
   
                                                            Result Comment: PERF  
ORMED BY:  
[place] [address]  
[phone]  
PATHOLOGIST MEDICAL DIRECTOR  
LILIA ROCHA M.D.   
   
                                                            Performed By: #### C  
MP, MG ####  
[address]   
   
                                                    Troponin I.cardiac [Mass/vol  
ume] in Serum or Plasma by Detection limit <= 0.01   
ng/Ordered By: Yue Blackwell on 2024   
   
                                                    Troponin I.cardiac DL   
<= 0.01 ng/mL   
[Mass/Vol]      29.2 pg/mL                      0.0-15.0        [facility]  
   
                                                    Urea nitrogen [Mass/volume]   
in Serum or PlasmaOrdered By: Yue Blackwell on   
2024   
   
                                                    Urea nitrogen   
[Mass/Vol]      11 mg/dL                        7-25            [facility]  
   
                                                    WBC Auto (Bld) [#/Vol]Ordere  
d By: Yue Blackwell on 2024   
   
                      WBC (Bld) [#/Vol] 6.5 10*3/uL            3.8-11.6   [facility]  
   
                                                    Activated partial thrombopla  
stin time (aPTT) in platelet poor plasma by   
coagulation   
aOrdered By: Harleen Singh on 2024   
   
                      aPTT Coag (PPP) [Time] 29.3 s                25.1-36.5  [facility]  
   
                                        Comment on above:   A hematocrit value g  
reater than 55% may lead to inaccurate   
results in coagulation testing. Patients having hematocrit   
values >55% require a special collection tube for coagulation   
studies. Please contact the laboratory at [phone] for   
redraw instructions.   
   
                                                    Alanine aminotransferase [En  
zymatic activity/volume] in Serum or PlasmaOrdered   
By:   
Harleen Singh on 2024   
   
                                                    ALT [Catalytic   
activity/Vol]   17 U/L                          7-52            [facility]  
   
                                                    Albumin [Mass/volume] in Ser  
um or Plasma by Bromocresol green (BCG) dye binding   
methoOrdered By: Harleen Singh on 2024   
   
                                                    Albumin BCG dye   
[Mass/Vol]      4.2 g/dL                        3.5-5.7         [facility]  
   
                                                    Alkaline phosphatase [Enzyma  
tic activity/volume] in Serum or PlasmaOrdered By:   
Harleen Singh on 2024   
   
                                                    ALP [Catalytic   
activity/Vol]   58 U/L                                    [facility]  
   
                                                    Aspartate aminotransferase [  
Enzymatic activity/volume] in Serum or PlasmaOrdered  
By:   
Harleen Singh on 2024   
   
                                                    AST [Catalytic   
activity/Vol]   23 U/L                          13-39           [facility]  
   
                                                    Automated erythrocytes count  
 in urine sediment (number/area)Ordered By: Harleen Singh on 2024   
   
                                                    RBC Auto (Urine sed)   
[#/Area]        0-1 [HPF]                       0-4             [facility]  
   
                                                    Automated leukocytes count i  
n urine sediment (number/area)Ordered By: Harleen Singh   
on 2024   
   
                                                    WBC Auto (Urine sed)   
[#/Area]        0-1 [HPF]                       0-4             [facility]  
   
                                                    B-Type Natriuretic Peptideon  
 2024   
   
                                                    Natriuretic peptide B   
(Bld) [Mass/Vol] 353.0 pg/mL     High            5-100           [facility]  
   
                                        Comment on above:   Result Comment: PERF  
ORMED BY:  
[place] [address]  
[phone]  
PATHOLOGIST MEDICAL DIRECTOR  
LILIA ROCHA M.D.   
   
                                                            Performed By: #### P  
TT, BNP, SCAN CBC, HS TROP, PT ####  
[facility]  
[address]   
   
                                                    Bilirubin Test strip Ql (U)O  
rdered By: Harleen Singh on 2024   
   
                      Bilirubin Ql (U) Negative              Negative   [facility]  
   
                                                    Bilirubin.total [Mass/volume  
] in Serum or PlasmaOrdered By: Harleen Singh on   
2024   
   
                      Bilirubin [Mass/Vol] 1.0 mg/dL             0.3-1.0    [facility]  
   
                                                    CT head/brain wo conon    
   
                                        CT head/brain wo con [facility]  
[address] [place] [address]  
  
CT Scan Report  
Signed  
  
Patient: Jessica Qiuck   
MR#: [id number]  
[id number]  
: 1943   
Acct:[id number]  
  
Age/Sex: 80 / F ADM Date:   
24  
  
Loc: ER Room: Type: [facility]   
ER  
Attending Dr:  
Copies to: STEVE Velasquez  
  
  
  
Ordering Provider:   
STEVE Velasquez  
Date of Service: 24  
Accession #:   
([id number] CT/CT   
head/brain wo con:   
lightheaded  
  
  
  
  
CT BRAIN WITHOUT   
CONTRAST:  
  
CLINICAL HISTORY:   
Dizziness lightheadedness   
dry heaves  
  
COMPARISON: None  
  
TECHNIQUE: Contiguous   
axial unenhanced images   
were obtained through the   
brain. This CT exam was  
performed using one or   
more following dose   
reduction techniques:   
Automated exposure   
control,  
adjustment of the mA   
and/or kV according to   
patient size, or use of   
iterative reconstruction  
technique.  
  
FINDINGS: There is no   
evidence of midline   
shift, intra or   
extra-axial fluid   
collection, hemorrhage  
or CT evidence of stroke.  
  
Cortical atrophy with   
chronic microvascular   
ischemic changes.  
  
Encephalomalacia left   
cerebellar hemisphere   
suggestive of prior   
infarct.  
  
Visualized intraorbital   
contents demonstrate no   
acute findings.  
  
Visualized paranasal   
sinuses are clear.  
  
The surrounding soft   
tissues are normal.  
  
  
  
ORDER #: [id number] CT/CT   
head/brain wo con  
IMPRESSION:  
  
  
  
NO ACUTE INTRACRANIAL   
ABNORMALITY.  
  
Impression dictated by:   
Nehemiah Anderson Jr.,   
D.O.2024 2:13 PM  
  
  
Dictation Location:   
[place] [address]  
  
  
  
Transcribed By: [facility]   
24 1413  
Dictated By: Nehemiah Anderson Jr, DO 24   
1409  
  
Signed By:  
24 1413       Normal                                  [facility]  
   
                                                    Color Auto (U)Ordered By: Samuel Singh on 2024   
   
                      Color (U)  Yellow                Yellow     [facility]  
   
                                                    Comprehensive Metabolic Pane  
[place] 2024   
   
                      Albumin [Mass/Vol] 4.2 g/dL   Normal     3.5-5.7    [facility]  
   
                                        Comment on above:   Performed By: #### C  
ABE, MG ####  
[facility]  
[address] [place] [address]   
   
                                                    Albumin/Globulin [Mass   
ratio]          1.8 {ratio}     Normal                          [facility]  
   
                                        Comment on above:   Performed By: #### C  
MP, MG ####  
[facility]  
[address] [id number] [place]   
   
                                                    ALP [Catalytic   
activity/Vol]   58 U/L          Normal                    [facility]  
   
                                        Comment on above:   Performed By: #### C  
MP, MG ####  
[facility]  
[address] [id number] [place]   
   
                                                    ALT [Catalytic   
activity/Vol]   17 U/L          Normal          7-52            [facility]  
   
                                        Comment on above:   Performed By: #### C  
MP, MG ####  
[facility]  
[address] [place] [address] [place]   
   
                      Anion gap [Moles/Vol] 9.8 mmol/L Normal     6.0-15.0   [facility]  
   
                                        Comment on above:   Performed By: #### C  
MP, MG ####  
[address]   
   
                                                    AST [Catalytic   
activity/Vol]   23 U/L          Normal          13-39           [facility]  
   
                                        Comment on above:   Performed By: #### C  
MP, MG ####  
[facility]  
[address] [place] [address]   
   
                      Bilirubin [Mass/Vol] 1.0 mg/dL  Normal     0.3-1.0    [facility]  
   
                                        Comment on above:   Performed By: #### C  
MP, MG ####  
[address]   
   
                      Calcium [Mass/Vol] 9.0 mg/dL  Normal     8.6-10.3   [facility]  
   
                                        Comment on above:   Performed By: #### C  
MP, MG ####  
[address]   
   
                      Chloride [Moles/Vol] 106 mmol/L Normal          [facility]  
   
                                        Comment on above:   Performed By: #### C  
MP, MG ####  
[facility]  
[address] [place] [address]   
   
                      CO2 [Moles/Vol] 28.0 mmol/L Normal     21.0-31.0  [facility]  
   
                                        Comment on above:   Performed By: #### C  
MP, MG ####  
[facility]  
[address] [place] [address]   
   
                      Creatinine [Mass/Vol] 1.01 mg/dL Normal     0.60-1.20  [facility]  
   
                                        Comment on above:   Performed By: #### C  
MP, MG ####  
[facility] [facility]  
[address] [place] [address] [place]   
   
                                                    Creatinine Clr Calc   
Pharmacy        40.64           Normal                          [facility]  
   
                                        Comment on above:   Performed By: #### C  
MP, MG ####  
[place] [address] [place]   
   
                                                    GFR/1.73 sq   
M.predicted MDRD   
(S/P/Bld) [Vol   
rate/Area]      56.627 mL/min/{1.73_m2} Normal                          [facility]  
   
                                        Comment on above:   Performed By: #### C  
MP, MG ####  
[facility]  
[address]   
   
                                                    Globulin (S)   
[Mass/Vol]      2.4 g/dL        [facility]  
   
                                        Comment on above:   Performed By: #### C  
MP, MG ####  
[facility]  
[address]   
   
                      Glucose [Mass/Vol] 108 mg/dL  High            [facility]  
   
                                        Comment on above:   Result Comment: Rand  
om Glucose Reference Range is dependent on  
   
time and  
content of last meal. Glucose of more than 200 mg/dL in a  
nonstressed, ambulatory subject supports the diagnosis of  
Diabetes Mellitus.  
ADA recommended reference range   
   
                                                            Performed By: #### C  
MP, MG ####  
[facility]  
[address]   
   
                      Potassium [Moles/Vol] 3.8 mmol/L Normal     3.5-5.1    [facility]  
   
                                        Comment on above:   Performed By: #### C  
MP, MG ####  
[facility]  
[address] [place] [address] [place]   
   
                      Protein [Mass/Vol] 6.6 g/dL   Normal     6.4-8.9    [facility]  
   
                                        Comment on above:   Performed By: #### C  
MP, MG ####  
[place] [address] [place]   
   
                      Sodium [Moles/Vol] 140 mmol/L Normal     136-145    [facility]  
   
                                        Comment on above:   Performed By: #### C  
MP, MG ####  
[place] [address] [place]   
   
                                                    Urea nitrogen   
[Mass/Vol]      13 mg/dL        Normal          7-25            [facility]  
   
                                        Comment on above:   Performed By: #### C  
MP, MG ####  
[place] [address] [place]   
   
                                                    Dipstick and Microscopicon 0  
2024   
   
                      Bacteria,Urine None Seen  Normal     None Seen  [facility]  
   
                                        Comment on above:   Order Comment: Name   
Collection Type:: Clean-Voided Midstream   
   
                                                            Performed By: #### C  
MP, MG ####  
[address]   
   
                      Hyaline Casts,Urine None Seen  Normal     0-8        [facility]  
   
                                        Comment on above:   Order Comment: Name   
Collection Type:: Clean-Voided Midstream   
   
                                                            Result Comment: PERF  
ORMED BY:  
[place] [address]  
[phone]  
PATHOLOGIST MEDICAL DIRECTOR  
LILIA ROCHA M.D.   
   
                                                            Performed By: #### C  
MP, MG ####  
[address]   
   
                                                    RBC LM.HPF (Urine sed)   
[#/Area]        0 /[HPF]        Normal          0-4             [facility]  
   
                                        Comment on above:   Order Comment: Name   
Collection Type:: Clean-Voided Midstream   
   
                                                            Performed By: #### C  
MP, MG ####  
[address]   
   
                                                    Squamous Epithelial   
Cell,Urine      None Seen       Normal          0-2             [facility]  
   
                                        Comment on above:   Order Comment: Name   
Collection Type:: Clean-Voided Midstream   
   
                                                            Performed By: #### C  
MP, MG ####  
[address]   
   
                                                    WBC LM.HPF (Urine sed)   
[#/Area]        0 /[HPF]        Normal          0-4             [facility]  
   
                                        Comment on above:   Order Comment: Name   
Collection Type:: Clean-Voided Midstream   
   
                                                            Performed By: #### C  
MP, MG ####  
[address]   
   
                                                    ECG 12 lead ECGon 2024  
   
   
                                        ECG 12 lead ECG     [facility]  
[address] [place] [address]  
  
Electrocardiograph Report  
Signed  
  
Patient: Jessica uQick   
MR#: [id number]  
[id number]  
: 1943   
Acct:[id number]  
  
Age/Sex: 80 / F ADM Date:   
24  
  
Loc:  Room: [address] [place] [address]   
Type: ADM INOo  
Attending Dr: Hermann Roque DO  
  
Ordering Provider:   
STEVE Velasquez  
Date of Service:   
01  
Accession #:   
([id number]) ECG/ECG 12   
lead ECG: Dizziness  
  
  
Copies to:  
  
  
Test Reason :  
Blood Pressure : 174/084   
mmHG  
Vent. Rate : 059 BPM   
Atrial Rate : 187 BPM  
P-R Int : 000 ms QRS Dur   
: 074 ms  
QT Int : 440 ms P-R-T   
Axes : 000 055 040   
degrees  
QTc Int : 435 ms  
  
Atrial fibrillation with   
slow ventricular response   
with a competing   
junctional pacemaker  
Septal infarct (cited on   
or before 21-SEP-2021)  
Abnormal ECG  
When compared with ECG of   
2023 18:20,  
Nonspecific T wave   
abnormality no longer   
evident in Lateral leads  
Confirmed by Juan David Maxwell DO ([id number]) on 2024   
3:57:01 PM  
  
Referred By:   
Electronically Signed   
By:Juan David Maxwell DO  
  
  
  
Transcribed By: [facility]  
Signed By Juan David Maxwell DO   
4 1557              Normal                                  [facility]  
   
                                                    Globulin Calc (S) [Mass/Vol]  
Ordered By: Harleen Singh on 2024   
   
                                                    Globulin (S)   
[Mass/Vol]      2.4 g/dL                                        [facility]  
   
                                                    Ketones Auto test strip (U)   
[Mass/Vol]Ordered By: Harleen Singh on 2024   
   
                      Ketones (U) [Mass/Vol] Negative              Negative   Fi  
[facility]  
   
                                                    Laboratory - UrinalysisOrder  
ed By: Harleen Singh on 2024   
   
                                                    Hyaline casts LM Ql   
(Urine sed)     None seen [LPF]                 0-8             [facility]  
   
                                                    Magnesiumon 2024   
   
                      Magnesium [Mass/Vol] 2.0 mg/dL  Normal     1.9-2.7    [facility]  
   
                                        Comment on above:   Result Comment: PERF  
ORMED BY:  
[place] [address]  
[phone]  
PATHOLOGIST MEDICAL DIRECTOR  
LILIA ROCHA M.D.   
   
                                                            Performed By: #### C  
MP MG ####  
[address]   
   
                                                    Magnesium [Mass/volume] in S  
judson or PlasmaOrdered By: Harleen Singh on   
2024   
   
                      Magnesium [Mass/Vol] 2.0 mg/dL             1.9-2.7    [facility]  
   
                                                    Monocyte distribution width   
[Entitic volume] in Blood by AutomatedOrdered By:   
Harleen Singh on 2024   
   
                                                    Monocyte distribution   
width Auto (Bld)   
[Entitic vol]   18.46 %                         0.00-20.00      [facility]  
   
                                                    Natriuretic peptide B [Mass/  
Vol]Ordered By: Harleen Singh on 2024   
   
                                                    Natriuretic peptide B   
(Bld) [Mass/Vol] 353.0 pg/mL                     5-100           [facility]  
   
                                                    Nitrite Test strip Ql (U)Ord  
ered By: Harleen Singh on 2024   
   
                      Nitrite Ql (U) Negative              Negative   [facility]  
   
                                                    Partial Thromboplastin Timeo  
n 2024   
   
                      aPTT Coag (Bld) [Time] 29.3 s     Normal     25.1-36.5  [facility]  
   
                                        Comment on above:   Result Comment: A he  
matocrit value greater than 55% may lead   
to   
inaccurate  
results in coagulation testing. Patients having hematocrit  
values >55% require a special collection tube for  
coagulation studies.  
Please contact the laboratory at [phone] for redraw  
instructions.  
PERFORMED BY:  
[place] [address]  
[phone]  
PATHOLOGIST MEDICAL DIRECTOR  
LILIA ROCHA M.D.   
   
                                                            Performed By: #### P  
TT, BNP, SCAN CBC, HS TROP, PT ####  
[address]   
   
                                                    Protein Auto test strip (U)   
[Mass/Vol]Ordered By: Harleen Singh on 2024   
   
                      Protein (U) [Mass/Vol] Trace mg/dL            Negative   F  
[facility]  
   
                                                    Protein [Mass/volume] in Ser  
um or PlasmaOrdered By: Harleen Singh on   
2024   
   
                      Protein [Mass/Vol] 6.6 g/dL              6.4-8.9    [facility]  
   
                                                    Prothrombin Time INRon    
   
                                                    INR Coag (PPP)   
[Relative time] 1.1 {INR}       Normal                          [facility]  
   
                                        Comment on above:   Result Comment: INR   
Therapeutic Range  
A) Pre- and Peroperative OAT started two weeks before  
surgery. NOT HIP SURGERY: 1.5 - 2.5  
HIP SURGERY: 2 - 3  
B) Primary and secondary prevention of venous  
THROMBOSIS: 2 - 3  
C) Active venous thrombosis, pulmonary embolism  
and prevention of recurrent venous thrombosis: 2 - 3  
D) Prevention of arterial thromboembolism  
including patients with mechanical heart valves: 3 - 4.5   
   
                                                            Performed By: #### P  
TT, BNP, SCAN CBC, HS TROP, PT ####  
[facility]  
[address]   
   
                      PT Coag (PPP) [Time] 12.3 s     Normal     9.0-12.9   [facility]  
   
                                        Comment on above:   Result Comment: A he  
matocrit value greater than 55% may lead   
to   
inaccurate  
results in coagulation testing. Patients having hematocrit  
values >55% require a special collection tube for  
coagulation studies.  
Please contact the laboratory at [phone] for redraw  
instructions.   
   
                                                            Performed By: #### P  
TT, BNP, SCAN CBC, HS TROP, PT ####  
[address]   
   
                                                    Scan and CBCon 2024   
   
                      Anisocytosis Ql (Bld) Slight     Normal                [facility]  
   
                                        Comment on above:   Performed By: #### P  
TT, BNP, SCAN CBC, HS TROP, PT ####  
[facility]  
[address]   
   
                                                    Basophils (Bld)   
[#/Vol]         0.1 10*3/uL     Normal          0.0-0.2         [facility]  
   
                                        Comment on above:   Performed By: #### P  
TT, BNP, SCAN CBC, HS TROP, PT ####  
[address]   
   
                                                    Basophils/100 WBC   
(Bld)           1.2 %           Normal          .               [facility]  
   
                                        Comment on above:   Performed By: #### P  
TT, BNP, SCAN CBC, HS TROP, PT ####  
[facility]  
[address] [place] [address] [place]   
   
                                                    Eosinophils (Bld)   
[#/Vol]         0.1 10*3/uL     Normal          0.0-0.45        [facility]  
   
                                        Comment on above:   Performed By: #### P  
TT, BNP, SCAN CBC, HS TROP, PT ####  
[place] [address] [place]   
   
                                                    Eosinophils/100 WBC   
(Bld)           2.0 %           Normal          .               [facility]  
   
                                        Comment on above:   Performed By: #### P  
TT, BNP, SCAN CBC, HS TROP, PT ####  
[address]   
   
                                                    Erythrocyte   
distribution width   
(RBC) [Ratio]   16.4 %          High            11.9-15.3       [facility]  
   
                                        Comment on above:   Performed By: #### P  
TT, BNP, SCAN CBC, HS TROP, PT ####  
[address]   
   
                                                    Hematocrit (Bld)   
[Volume fraction] 29.0 %          Low             34.0-46.4       [facility]  
   
                                        Comment on above:   Performed By: #### P  
TT, BNP, SCAN CBC, HS TROP, PT ####  
[address]   
   
                                                    Hemoglobin (Bld)   
[Mass/Vol]      9.1 g/dL        Low             11.8-15.4       [facility]  
   
                                        Comment on above:   Performed By: #### P  
TT, BNP, SCAN CBC, HS TROP, PT ####  
[address]   
   
                      Hypochromasia Moderate   Normal                [facility]  
   
                                        Comment on above:   Performed By: #### P  
TT, BNP, SCAN CBC, HS TROP, PT ####  
[address]   
   
                                                    Lymphocytes (Bld)   
[#/Vol]         1.0 10*3/uL     Normal          1.00-4.8        [facility]  
   
                                        Comment on above:   Performed By: #### P  
TT, BNP, SCAN CBC, HS TROP, PT ####  
[address]   
   
                                                    Lymphocytes/100 WBC   
(Bld)           23.1 %          Normal          .               [facility]  
   
                                        Comment on above:   Performed By: #### P  
TT, BNP, SCAN CBC, HS TROP, PT ####  
[address]   
   
                                                    MCH (RBC) [Entitic   
mass]           20.8 pg         Low             24.7-34.3       [facility]  
   
                                        Comment on above:   Performed By: #### P  
TT, BNP, SCAN CBC, HS TROP, PT ####  
[facility]  
[address] [place] [address]   
   
                                                    MCV (RBC) [Entitic   
vol]            66.5 fL         Low                       [facility]  
   
                                        Comment on above:   Performed By: #### P  
TT, BNP, SCAN CBC, HS TROP, PT ####  
[address]   
   
                                                    Mean Corpuscular HGB   
Conc            31.3 g/dL       Low             32.0-35.0       [facility]  
   
                                        Comment on above:   Performed By: #### P  
TT, BNP, SCAN CBC, HS TROP, PT ####  
[address]   
   
                      Microcytosis Moderate   Normal                [facility]  
   
                                        Comment on above:   Performed By: #### P  
TT, BNP, SCAN CBC, HS TROP, PT ####  
[address]   
   
                                                    Monocytes (Bld)   
[#/Vol]         0.3 10*3/uL     Normal          0.0-0.8         [facility]  
   
                                        Comment on above:   Performed By: #### P  
TT, BNP, SCAN CBC, HS TROP, PT ####  
[place] [address] [place]   
   
                                                    Monocytes/100 WBC   
(Bld)           18.46 %         Normal          0.00-20.00      [facility]  
   
                                        Comment on above:   Performed By: #### P  
TT, BNP, SCAN CBC, HS TROP, PT ####  
[place] [address] [place]   
   
                                                    Monocytes/100 WBC   
(Bld)           7.5 %           Normal          .               [facility]  
   
                                        Comment on above:   Performed By: #### P  
TT, BNP, SCAN CBC, HS TROP, PT ####  
[place] [address] [place]   
   
                                                    Neutrophils (Bld)   
[#/Vol]         3.0 10*3/uL     Normal          1.8-7.7         [facility]  
   
                                        Comment on above:   Performed By: #### P  
TT, BNP, SCAN CBC, HS TROP, PT ####  
[place] [address] [place]   
   
                                                    Neutrophils/100 WBC   
(Bld)           66.2 %          Normal          .               [facility]  
   
                                        Comment on above:   Performed By: #### P  
TT, BNP, SCAN CBC, HS TROP, PT ####  
[address]   
   
                      NRBC%      0.1 /100{WBC} Normal     0-0.5      [facility]  
   
                                        Comment on above:   Performed By: #### P  
TT, BNP, SCAN CBC, HS TROP, PT ####  
[address]   
   
                      Ovalocytes Moderate   Normal                [facility]  
   
                                        Comment on above:   Performed By: #### P  
TT, BNP, SCAN CBC, HS TROP, PT ####  
[address]   
   
                      Platelet Estimate Normal     Normal     Normal     [facility]  
   
                                        Comment on above:   Performed By: #### P  
TT, BNP, SCAN CBC, HS TROP, PT ####  
[address]   
   
                                                    Platelet mean volume   
(Bld) [Entitic vol] 7.7 fL          Normal          6.3-10.7        [facility]  
   
                                        Comment on above:   Performed By: #### P  
TT, BNP, SCAN CBC, HS TROP, PT ####  
[address]   
   
                      Platelet Morphology Normal     Normal     Normal     [facility]  
   
                                        Comment on above:   Result Comment: PERF  
ORMED BY:  
[place] [address]  
[phone]  
PATHOLOGIST MEDICAL DIRECTOR  
LILIA ROCHA M.D.   
   
                                                            Performed By: #### P  
TT, BNP, SCAN CBC, HS TROP, PT ####  
[address]   
   
                                                    Platelets (Bld)   
[#/Vol]         299 10*3/uL     Normal          150-450         [facility]  
   
                                        Comment on above:   Performed By: #### P  
TT, BNP, SCAN CBC, HS TROP, PT ####  
[address]   
   
                      Polychromasia Slight     Normal                [facility]  
   
                                        Comment on above:   Performed By: #### P  
TT, BNP, SCAN CBC, HS TROP, PT ####  
[facility]  
[address]   
   
                      RBC (Bld) [#/Vol] 4.37 10*6/uL Normal     3.60-5.00  [facility]  
   
                                        Comment on above:   Performed By: #### P  
TT, BNP, SCAN CBC, HS TROP, PT ####  
[facility]  
[address]   
   
                      Schistocytes Moderate   Normal                [facility]  
   
                                        Comment on above:   Performed By: #### P  
TT, BNP, SCAN CBC, HS TROP, PT ####  
[facility]  
[address]   
   
                      WBC (Bld) [#/Vol] 4.5 10*3/uL Normal     3.8-11.6   [facility]  
   
                                        Comment on above:   Performed By: #### P  
TT, BNP, SCAN CBC, HS TROP, PT ####  
[address]   
   
                                                    Serum or plasma albumin/glob  
ulin mass ratioOrdered By: Harleen Singh on   
2024   
   
                                                    Albumin/Globulin [Mass   
ratio]          1.8 {ratio}                                     [facility]  
   
                                                    Specific gravity Auto test s  
trip (U) [Rel density]Ordered By: Harleen Singh on  
  
2024   
   
                                                    Specific gravity (U)   
[Rel density]       1.007                                   1.001-1.03  
0                                       [facility]  
   
                                                    Squamous epithelial cells de  
tection in urine sediment by light microscopyOrdered  
By:   
Harleen Singh on 2024   
   
                                                    Epithelial   
cells.squamous LM Ql   
(Urine sed)     None seen [HPF]                 0-2             [facility]  
   
                                                    Troponin I High Sensitivityo  
n 2024   
   
                                                    Troponin I High   
Sensitivity     30.7 pg/mL      High            0.0-15.0        [facility]  
   
                                        Comment on above:   Order Comment: GETTI  
NG READY TO MOVE TO FLOOR  
[address]   
   
                                                            Result Comment: PERF  
ORMED BY:  
[place] [address]  
[phone]  
PATHOLOGIST MEDICAL DIRECTOR  
LILIA ROCHA M.D.   
   
                                                            Performed By: #### H  
S TROP ####  
[address]   
   
                                                    Troponin I High   
Sensitivity     29.3 pg/mL      High            0.0-15.0        [facility]  
   
                                        Comment on above:   Result Comment: PERF  
ORMED BY:  
[place] [place] [address]  
[phone]  
PATHOLOGIST MEDICAL DIRECTOR  
LILIA ROCHA M.D.   
   
                                                            Performed By: #### P  
TT, BNP, SCAN CBC, HS TROP, PT ####  
[address]   
   
                                                    Urinalysison 2024   
   
                      Appearance (U) Clear      Normal     Clear      [facility]  
   
                                        Comment on above:   Order Comment: Name   
Collection Type:: Clean-Voided Midstream   
   
                                                            Performed By: #### C  
MP, MG ####  
[address]   
   
                      Bilirubin,Urine Negative   Normal     Negative   [facility]  
   
                                        Comment on above:   Order Comment: Name   
Collection Type:: Clean-Voided Midstream   
   
                                                            Performed By: #### C  
MP, MG ####  
[address]   
   
                      Color (U)  Yellow     Normal     Yellow     [facility]  
   
                                        Comment on above:   Order Comment: Name   
Collection Type:: Clean-Voided Midstream   
   
                                                            Performed By: #### C  
MP, MG ####  
[address]   
   
                      Glucose Ql (U) Normal     Normal     Normal     [facility]  
   
                                        Comment on above:   Order Comment: Name   
Collection Type:: Clean-Voided Midstream   
   
                                                            Performed By: #### C  
MP, MG ####  
[address]   
   
                      Ketones Ql (U) Negative   Normal     Negative   [facility]  
   
                                        Comment on above:   Order Comment: Name   
Collection Type:: Clean-Voided Midstream   
   
                                                            Performed By: #### C  
MP, MG ####  
[address]   
   
                                                    Leukocyte esterase   
Test strip Ql (U) 1+              High            Negative        [facility]  
   
                                        Comment on above:   Order Comment: Name   
Collection Type:: Clean-Voided Midstream   
   
                                                            Performed By: #### C  
MP, MG ####  
[facility]  
[address] [place] [address] [place]   
   
                      Nitrite,Urine Negative   Normal     Negative   [facility]  
   
                                        Comment on above:   Order Comment: Name   
Collection Type:: Clean-Voided Midstream   
   
                                                            Performed By: #### C  
MP, MG ####  
[address]   
   
                      Occult Blood,Urine Negative   Normal     Negative   [facility]  
   
                                        Comment on above:   Order Comment: Name   
Collection Type:: Clean-Voided Midstream   
   
                                                            Result Comment: PERF  
ORMED BY:  
[place] [address]  
[phone]  
PATHOLOGIST MEDICAL DIRECTOR  
LILIA ROCHA M.D.   
   
                                                            Performed By: #### C  
MP, MG ####  
[address]   
   
                      pH (U)     7.0 [pH]   Normal     5.0-9.0    [facility]  
   
                                        Comment on above:   Order Comment: Name   
Collection Type:: Clean-Voided Midstream   
   
                                                            Performed By: #### C  
MP, MG ####  
[place] [address] [place]   
   
                      Protein,Urine Trace      High       Negative   [facility]  
   
                                        Comment on above:   Order Comment: Name   
Collection Type:: Clean-Voided Midstream   
   
                                                            Performed By: #### C  
MP, MG ####  
[address]   
   
                                                    Specificy   
Gravity,Urine       1.007               Normal              1.001-1.03  
0                                       [facility]  
   
                                        Comment on above:   Order Comment: Name   
Collection Type:: Clean-Voided Midstream   
   
                                                            Performed By: #### C  
MP, MG ####  
[place] [address] [place]   
   
                      Urobilinogen,Urine Normal     Normal     Normal     [facility]  
   
                                        Comment on above:   Order Comment: Name   
Collection Type:: Clean-Voided Midstream   
   
                                                            Performed By: #### C  
MP, MG ####  
[address]   
   
                                                    Urine bacteria detection by   
automated methodOrdered By: Harleen Singh on   
2024   
   
                      Bacteria Auto Ql (U) None seen             None Seen  [facility]  
   
                                                    Urine clarity by refractomet  
ry automatedOrdered By: Harleen Singh on   
2024   
   
                                                    Clarity Refractometry   
automated (U)   Clear                           Clear           [facility]  
   
                                                    Urine glucose measurement by  
 automated test strip (mass/volume)Ordered By:   
Harleen Singh on 2024   
   
                                                    Glucose Auto test   
strip (U) [Mass/Vol] Normal mg/dL                    Normal          [facility]  
   
                                                    Urine hemoglobin detection b  
y automated test stripOrdered By: Halreen Singh on  
   
2024   
   
                                                    Hemoglobin Auto test   
strip Ql (U)    Negative                        Negative        [facility]  
   
                                                    Urine leukocyte esterase det  
ection by automated test stripOrdered By: Harleen Singh   
on 2024   
   
                                                    Leukocyte esterase   
Auto test strip Ql (U) 1+                              Negative        [facility]  
   
                                                    Urobilinogen Auto test strip  
 (U) [Mass/Vol]Ordered By: Harleen Singh on   
2024   
   
                                                    Urobilinogen (U)   
[Mass/Vol]      Normal mg/dL                    Normal          [facility]  
   
                                                    XR chest 2V*on 2024   
   
                                        XR chest 2V*        [facility] [place] [address]  
  
XRay Report  
Signed  
  
Patient: Jessica Quick   
MR#: [id number]  
[id number]  
: 1943   
Acct:[id number]  
  
Age/Sex: 80 / F ADM Date:   
24  
  
Loc: ER Room: Type: [facility]   
ER  
Attending Dr:  
Copies to: STEVE Velasquez  
  
  
  
Ordering Provider:   
STEVE Velasquez  
Date of Service: 24  
Accession #:   
([id number] XR/XR chest   
2V*: Dizziness  
  
  
  
  
XR chest 2V* 2024   
11:05 AM  
  
SIGNS AND SYMPTOMS:   
Dizziness, dry heaves,   
sweats  
  
PROTOCOL: Frontal and   
lateral radiographs of   
the chest  
  
COMPARISON: 2022  
  
FINDINGS:  
  
The trachea is midline.   
The heart and mediastinal   
structures are within   
normal limits. The lung  
parenchyma is clear. The   
bony thorax is intact..   
Degenerative changes are   
noted in the thoracic  
spine and shoulders.  
  
  
ORDER #: [id number] XR/XR   
chest 2V*  
IMPRESSION:  
  
No acute cardiopulmonary   
pathology.  
  
Impression dictated by:   
Rosa Maurice M.D.2024 12:07 PM  
  
  
Dictation Location:   
[place] [address]  
  
  
  
Transcribed By: PWS   
24  
Dictated By: Rosa Maurice II, MD 24  
  
Signed By:  
24       [facility]  
   
                                                    pH Auto test strip (U)Ordere  
d By: Harleen Singh on 2024   
   
                      pH (U)     7.0 [pH]              5.0-9.0    [facility]  
   
                                                    Patient Provided Health Data  
on 2023   
   
                                                    Patient Provided   
Health Data                             [id number]  
[id number]  
IFF                 [facility]  
   
                                                    Patient Handouton 2023  
   
   
                                        Patient Handout     [id number]  
  
[id number]  
TIFF                [facility]  
   
                                                    ECG 12 lead ECGon 2023  
   
   
                                        ECG 12 lead ECG     [facility] [place] [address]  
  
Electrocardiograph Report  
Signed  
  
Patient: Jessica Quick   
MR#: [id number]  
[id number]  
: 1943   
Acct:[id number]  
  
Age/Sex: 79 / F ADM Date:   
23  
  
Loc: ER Room: Type: [facility]   
ER  
Attending Dr:  
  
Ordering Provider:   
Morteza Nguyễn DO  
Date of Service:   
  
Accession #:   
([id number]) ECG/ECG 12   
lead ECG: Headache  
  
  
Copies to:  
  
  
Test Reason :  
Blood Pressure : 184/109   
mmHG  
Vent. Rate : 068 BPM   
Atrial Rate : 076 BPM  
P-R Int : 000 ms QRS Dur   
: 080 ms  
QT Int : 406 ms P-R-T   
Axes : 000 062 004   
degrees  
QTc Int : 431 ms  
  
Atrial fibrillation  
Septal infarct (cited on   
or before 21-SEP-2021)  
Abnormal ECG  
When compared with ECG of   
2022 17:03,  
Vent. rate has decreased   
BY 48 BPM  
Confirmed by MORTEZA NGUYỄN DO ([id number]) on 2023   
7:23:19 PM  
  
Referred By:   
Electronically Signed   
By:MORTEZA NGUYỄN DO  
  
  
  
Transcribed By: MUS  
Signed By Morteza Nguyễn DO 1923             [facility]  
   
                                                    XR knee LT 2Von 2022   
   
                                        XR knee LT 2V       [facility]   
[employer]   
[employer]  
Other Phone:   
[phone]  
   
                      XR knee LT 2V [facility]  
 [facility]   
[facility]   
[employer]  
Other Phone:   
[phone]  
   
                      XR knee LT 2V [address] [facility]   
Professional   
Corporation  
Other Phone:   
[phone]  
   
                      XR knee LT 2V [place] [address]                       [facility]   
Professional   
Corporation  
Other Phone:   
[phone]  
   
                      XR knee LT 2V XRay Report                       [facility]  
t   
Professional   
Corporation  
Other Phone:   
[phone]  
   
                      XR knee LT 2V Signed                           [facility]  
   
Professional   
[employer]  
Other Phone:   
[phone]  
   
                                        XR knee LT 2V       Patient: Martinez Quick   
MR#: [id number]                                                [facility]   
Professional   
Corporation  
Other Phone:   
[phone]  
   
                      XR knee LT 2V 839                              [facility]  
   
[employer]   
[employer]  
Other Phone:   
[phone]  
   
                                        XR knee LT 2V       : 1943   
Acct:[id number]                                             [facility]   
Professional   
Corporation  
Other Phone:   
[phone]  
   
                                        XR knee LT 2V       Age/Sex: 79 / F ADM   
Date:   
22                                                    [facility]   
[employer]   
[employer]  
Other Phone:   
[phone]  
   
                                        XR knee LT 2V       Loc: SOXD Room: Type  
: On license of [facility]   
Professional   
Corporation  
Other Phone:   
[phone]  
   
                                        XR knee LT 2V       Attending Dr: Andrew Freeman DO                                                   [facility]   
[employer]   
[employer]  
Other Phone:   
[phone]  
   
                                        XR knee LT 2V       Copies to: Andrew Freeman, DO                                                  [facility]   
[employer]   
[employer]  
Other Phone:   
[phone]  
   
                                        XR knee LT 2V       Ordering Provider: MARIBEL Freeman DO                                                [facility]   
[employer]   
[employer]  
Other Phone:   
[phone]  
   
                      XR knee LT 2V Date of Service: 22                     
    [facility]   
[employer]   
[employer]  
Other Phone:   
[phone]  
   
                                        XR knee LT 2V       Accession #:   
([id number]) XR/XR knee   
LT 2V: Primary   
osteoarthritis of left   
knee                                                        [facility]   
[employer]   
[employer]  
Other Phone:   
[phone]  
   
                      XR knee LT 2V LEFT KNEE - 2 views                       No  
[employer]  
Other Phone:   
[phone]  
   
                      XR knee LT 2V COMPARISON: 2022                         
[facility]   
[employer]   
[employer]  
Other Phone:   
[phone]  
   
                                        XR knee LT 2V       CLINICAL DATA: Follo  
w-up   
knee replacement.   
Decreased mobility.                                         [facility]   
[employer]   
[employer]  
Other Phone:   
[phone]  
   
                                        XR knee LT 2V       AP and lateral   
weightbearing views were   
obtained. A knee   
prosthesis is again   
visualized. The                                             [facility]   
[employer]   
[employer]  
Other Phone:   
[phone]  
   
                                        XR knee LT 2V       hardware appears int  
act   
and unchanged from prior.   
There is no developing   
fracture or dislocation.   
A                                                           [facility]   
[employer]   
[employer]  
Other Phone:   
[phone]  
   
                                        XR knee LT 2V       large amount of join  
t   
fluid is again noted.                                         [facility]   
[employer]   
[employer]  
Other Phone:   
[phone]  
   
                                        XR knee LT 2V       ORDER #: [id number]  
R/XR   
knee LT 2V                                                  [facility]   
[employer]   
[employer]  
Other Phone:   
[phone]  
   
                      XR knee LT 2V IMPRESSION:                       [place]  
t   
Professional   
Corporation  
Other Phone:   
[phone]  
   
                      XR knee LT 2V STABLE KNEE REPLACEMENT.                      
   [facility]   
[employer]   
[employer]  
Other Phone:   
[phone]  
   
                      XR knee LT 2V PERSISTENT KNEE EFFUSION.                     
    [facility]   
[employer]   
[employer]  
Other Phone:   
[phone]  
   
                                        XR knee LT 2V       Impression dictated   
by:   
Barbara Weber M.D.2022 4:12 PM                                         [facility]   
[employer]   
[employer]  
Other Phone:   
[phone]  
   
                                        XR knee LT 2V       Dictation Location:   
[place] [address]                                                 [facility]   
[employer]   
[employer]  
Other Phone:   
[phone]  
   
                                        XR knee LT 2V       Transcribed By: CARMELO   
22 [facility]                                               [facility]   
[employer]   
[employer]  
Other Phone:   
[phone]  
   
                                        XR knee LT 2V       Dictated By: Barbara Weber MD [facility]                                         [facility]   
[employer]   
[employer]  
Other Phone:   
[phone]  
   
                      XR knee LT 2V Signed By:                       [facility]  
   
Professional   
[employer]poration  
Other Phone:   
[phone]  
   
                      XR knee LT 2V [facility]                       [place]  
[facility]   
[facility]   
[employer]  
Other Phone:   
[phone]  
   
                                                    Office Visit (Cardiology)on   
2022   
   
                                        Follow-up visit     Diagnoses/Problems  
Assessed  
Essential hypertension,   
benign (401.1) (I10)  
Chronic atrial   
fibrillation (427.31)   
(I48.20)  
Anticoagulated (V58.61)   
(Z79.01)  
Patient Instructions  
Please bring all   
medicines, vitamins, and   
herbal supplements with   
you when you come to the   
office.  
Prescriptions will not be   
filled unless you are   
compliant with your   
follow up appointments or   
have a follow up   
appointment scheduled as   
per instruction of your   
physician. Refills should   
be requested at the time   
of your visit.  
Fall Prevention Education   
Given  
PLAN:  
Through informed decision   
making process   
incorporating patients   
unique circumstances, the   
following treatment plan   
will be initiated:  
1. Prescription drug   
management of   
cardiovascular medication   
for efficacy, adherence   
to treatment, side effect   
assessment and   
polypharmacy. Current   
treatment clinically   
warranted and to continue   
with following   
modifications:  
- Stop amiodarone  
2. Please check your   
blood pressure daily 2   
hours after medications   
and call me with average.   
Goal is top number below   
140.  
3. Return for follow-up;   
in the interim, contact   
the office if new   
symptoms arise.  
Dr. Norris as scheduled  
  
Chief Complaint  
Medication changes:   
'doing fine'  
JESSICA QUICK is being   
seen for a 4 week   
follow-up of atrial   
fibrillation and   
hypertension.  
Patient presents to the   
office today ambulatory   
with steady gait.  
Last evaluated Dr. Norris 2022. At   
that time   
antihypertensives were   
changed to valsartan and   
she was initiated on   
amiodarone 400 mg twice   
daily. She was unable to   
tolerate that dosing and   
has been taking 200 mg   
daily. I believe plans   
were for cardioversion to   
attempt restoration of   
normal sinus rhythm.  
On record review, in 2018   
patient was tried on   
amiodarone but was   
discontinued due to GI   
distress. At that time   
treatment strategy was   
changed to rate control   
and she had been   
maintained chronic atrial   
fibrillation with   
controlled ventricular   
rate on metoprolol. In   
atrial fibrillation, she   
denies palpitations, no   
dyspnea on exertion, no   
change in exercise   
capacity or functional   
tolerance. Today we   
discussed purpose of   
amiodarone and eventual   
need for cardioversion.   
She is very reluctant to   
proceed. In light of   
intolerance to   
amiodarone, reluctance to   
proceed with   
cardioversion and   
asymptomatic atrial   
fibrillation with optimal   
rate control feel in her   
best interest to continue   
with treatment strategy   
of rate control and   
anticoagulation. Patient   
is in agreement.  
Daily activity includes   
ADLs, housework, she is   
able to go to the grocery   
store. She ambulated in   
from the parking lot   
without any symptoms.   
There have been no   
utilization of   
nitroglycerin.  
2019 LVEF 60 to 65%, mild   
LVH, left atrium severely   
dilated. Currently does   
not exhibit any symptoms   
concerning for   
decompensated heart   
failure.  
Otherwise, blood pressure   
remains suboptimal here   
in the office. She did   
not take her blood   
pressure medications   
prior to coming into the   
office today.  
Otherwise, she denies any   
change in overall   
cardiovascular status   
since last evaluation   
with Dr. Norris.  
  
History of Present   
Illness  
The patient presents with   
permanent atrial   
fibrillation. The   
treatment strategy for   
this patient is rate   
control. She states her   
atrial fibrillation has   
been well controlled   
since the last visit.  
Symptoms: denies   
palpitations, denies   
chest pain, denies   
exercise intolerance,   
denies dyspnea on   
exertion and denies   
dizziness. Associated   
symptoms include no   
syncope.  
Risks: no increased risk   
for falling.  
Medications: the patient   
is adherent with her   
medication regimen. She   
denies medication side   
effects.  
  
Surgical History  
Problems  
History of Complete   
colonoscopy  
Managed By: Rual Cutler DO  
History of Eye surgery  
lower pressure  
History of Knee   
replacement  
History of Percutaneous   
transluminal coronary   
angioplasty  
Current Meds  
  
Medication   
NameInstruction  
Acetaminophen Extra   
Strength CAPSTAKE 2   
CAPSULES 4 TIMES DAILY.  
Amiodarone HCl - 200 MG   
Oral TabletTAKE 2 TABLET   
Daily  
Aspirin EC 81 MG Oral   
Tablet Delayed   
ReleaseTAKE 1 TABLET   
DAILY AS DIRECTED.  
Atorvastatin Calcium 40   
MG Oral Tablettake 1   
tablet by mouth at   
bedtime  
BuSpar 15 MG TABSTAKE 1   
TABLET Daily prn  
Cyclobenzaprine HCl - 10   
MG Oral TabletTAKE 1   
TABLET BY MOUTH AS NEEDED   
THREE TIMES DAILY  
Eliquis 5 MG Oral   
TabletTAKE 1 TABLET BY   
MOUTH TWICE DAILY  
Gabapentin 300 MG Oral   
CapsuleTAKE 1 CAPSULE 2 -   
3 TIMES DAILY AS NEEDED  
Latanoprost 0.005 %   
Ophthalmic   
SolutionINSTILL 1 DROP IN   
BOTH EYES AT BEDTIME.  
Metoprolol Tartrate 25 MG   
Oral Tablettake 2 tablets   
by mouth twice daily  
Omeprazole 20 MG Oral   
Capsule Delayed   
ReleaseTAKE 1 CAPSULE   
DAILY EVERY MORNING   
BEFORE BREAKFAST.  
Valsartan 160 MG Oral   
TabletTAKE 1 TABLET BY   
MOUTH EVERY DAY  
Allergies  
Medication  
lisinopril  
Adverse Reaction; Chest   
Pain; Cough; Headache;   
Recorded By: Saida Mcneill; 2021   
2:48:50 PM  
ACE Inhibitors  
Adverse Reaction; Cough;   
Recorded By: Saida Mcneill; 2021   
2:48:50 PM (more content   
not included)...    Normal                                   [facility]  
   
                                                    Tobacco Screening.on   
022   
   
                                        Fall risk assessment b) One or more fall  
s in   
the last year                                               [facility]   
Locket DO  
Work Phone:   
[phone]  
0  
   
                                                    Tobacco use status   
[facility]            b) No                                           [facility]   
600 DO  
Work Phone:   
[phone]  
0  
   
                                                    Office Visit (Cardiology)on   
10-   
   
                                        Follow-up visit     Diagnoses/Problems  
Assessed  
Chronic atrial   
fibrillation (427.31)   
(I48.20)  
Atherosclerosis of native   
coronary artery of native   
heart without angina   
pectoris (414.01)  
(I25.10)  
Essential hypertension,   
benign (401.1) (I10)  
History of acute inferior   
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  
History of PTCA (V45.82)   
(Z98.61)  
Hyperlipidemia (272.4)   
(E78.5)  
Ischemic cardiomyopathy   
(414.8) (I25.5)  
Non-sustained ventricular   
tachycardia (427.1)   
(I47.29)  
High risk medication use   
(V58.69) (Z79.899)  
Body mass index (BMI) of   
21.0 to 21.9 in adult   
(V85.1) (Z68.21)  
Never a smoker  
Anticoagulated (V58.61)   
(Z79.01)  
*Orders  
Chronic atrial   
fibrillation  
Start: Amiodarone HCl -   
200 MG Oral Tablet; TAKE   
2 TABLET Daily  
IO EKG Electrocardiogram-   
12 Lead; Status:Complete;   
Done: 2022  
Start: Amiodarone HCl -   
200 MG Oral Tablet; TAKE   
2 TABLET Twice daily  
IO EKG Electrocardiogram-   
12 Lead; Status:Complete;   
Done: 2022  
Essential hypertension,   
benign  
Start: Valsartan 160 MG   
Oral Tablet; TAKE 1   
TABLET BY MOUTH EVERY DAY  
SocHx: Never a smoker  
Tobacco Use Screening;   
Status:Complete; Done:   
2022  
Aspirin EC 81 MG Oral   
Tablet Delayed Release;   
TAKE 1 TABLET DAILY AS   
DIRECTED; Last   
Rx:2022; Status:   
ACTIVE Ordered  
Rx By: Tayo Norris;   
Dispense: 90 Days ; #:90   
Tablet; Refill: 3;  
For: Atherosclerosis of   
native coronary artery of   
native heart without   
angina pectoris,   
Hyperlipidemia; SUNDAR = N;   
Print Rx; Last Updated   
By: Evette Weston;   
10/13/2022 4:09:58 PM  
Atorvastatin Calcium 40   
MG Oral Tablet; take 1   
tablet by mouth at   
bedtime;  
Therapy: 2022 to   
(Evaluate:2023)   
Requested for: 2022;   
Last Rx:2022;   
Status: ACTIVE Ordered  
Rx By: Tayo Norris;   
Dispense: 90 Days ; #:90   
Tablet; Refill: 3;  
For: Hyperlipidemia; SUNDAR   
= N; Print Rx; Last   
Updated By: Evette Weston; 10/13/2022 4:09:58   
PM  
Patient Instructions  
Please bring all   
medicines, vitamins, and   
herbal supplements with   
you when you come to the   
office.  
Prescriptions will not be   
filled unless you are   
compliant with your   
follow up appointments or   
have a follow up   
appointment scheduled as   
per instruction of your   
physician. Refills should   
be requested at the time   
of your visit.  
Follow up in 1 year.  
Bp check with Evette Cardona NP in 4 weeks  
EKG in 4 weeks  
  
Chief Complaint  
JESSICA QUICK is being   
seen for an annual   
follow-up of.  
79-year-old female who   
returns for follow-up and   
she is doing well she   
denies any cardiovascular   
complaints or shortness   
of breath or angina   
recurrent nitrate usage   
or hospitalizations. She   
has a known history of   
previous inferior MI with   
revascularization of the   
RCA in . In 2019 she   
underwent repeat   
catheterization for   
angina pectoris that   
revealed normal coronary   
arteries and widely   
patent RCA stent and   
normal left ventricular   
function. She developed   
paroxysmal atrial   
fibrillation afterwards,   
she has been on Eliquis   
and metoprolol.  
Today she presents with   
an irregularly irregular   
rhythm and confirmed with   
atrial fibrillation with   
good rate control.  
She is also hypertensive   
today on current   
therapies  
Recommendations:   
Discontinue losartan   
initiate valsartan 160,   
initiate amiodarone 400   
twice daily for 7 days   
then 400 daily with   
follow-up ECG in 4 weeks   
with possible   
cardioversion   
thereafterwards. Risk   
benefits alternatives and   
informed decision-making   
process discussed with   
patient extensively in   
regards to amiodarone   
therapy, routine   
laboratory and chest   
x-ray and pulmonary   
function test   
surveillance, and   
consideration for   
possible atrial ablation   
in the future if this   
does not work.  
  
Surgical History Problems  
History of Complete   
colonoscopy  
Managed By: Raul Cutler DO  
History of Knee   
replacement  
History of Percutaneous   
transluminal coronary   
angioplasty  
Current Meds  
  
Medication   
NameInstruction  
Acetaminophen Extra   
Strength CAPSTAKE 2   
CAPSULES 4 TIMES DAILY.  
Aspirin EC 81 MG Oral   
Tablet Delayed   
ReleaseTAKE 1 TABLET   
DAILY AS DIRECTED.  
Atorvastatin Calcium 40   
MG Oral Tablettake 1   
tablet by mouth at   
bedtime  
BuSpar 15 MG TABSTAKE 1   
TABLET Daily prn  
Cyclobenzaprine HCl - 10   
MG Oral TabletTAKE 1   
TABLET BY MOUTH AS NEEDED   
THREE TIMES DAILY  
Eliquis 5 MG Oral   
TabletTAKE 1 TABLET BY   
MOUTH TWICE DAILY  
Gabapentin 300 MG Oral   
CapsuleTAKE 1 CAPSULE 2 -   
3 TIMES DAILY AS NEEDED  
Latanoprost 0.005 %   
Ophthalmic   
SolutionINSTILL 1 DROP IN   
BOTH EYES AT BEDTIME.  
Losartan Potassium 100 MG   
Oral Tablettake 1 tablet   
by mouth once daily  
Metoprolol Tartrate 25 MG   
Oral Tablettake 2 tablets   
by mouth twice daily  
Omeprazole 20 MG Oral   
Capsule Delayed   
ReleaseTAKE 1 CAPSULE   
DAILY EVERY MORNING   
BEFORE BREAKFAST.  
Allergies  
Medication  
lisinopril  
Adverse Reaction; Chest   
Pain; Cough; Headache;   
Recorded By: Saida Mcneill; 2021   
2:48:50 PM  
ACE Inhibitors  
Adverse Reaction; Cough;   
Recorded By: Saida Mcneill; 2021   
2:48:50 PM  
Social History  
Problems  
Caffeine use (V49.89)   
(Z78.9)  
1-2 cups coffee daily,   
not too much soda  
Consumes alcohol (V49   
(more content not   
included)...        Normal                                   Yoostay  
   
                                                    Tobacco Screening.on 10-13-2  
022   
   
                                                    Adult depression   
screening assessment No                                              [facility]  
[employer]   
[facility]  
[address] DO  
Work Phone:   
[phone]  
0  
   
                                        Fall risk assessment a) No falls within   
the   
last year                                                   Merged with [facility]   
[facility]  
[address] DO  
Work Phone:   
[phone]  
0  
   
                                                    Tobacco use status   
[facility]            b) No                                           Merged with [facility]   
[facility]  
[address] DO  
Work Phone:   
[phone]  
0  
   
                                                    Activated partial thrombopla  
stin time (aPTT) in platelet poor plasma by   
coagulation   
aOrdered By: Bart Guzmán on 2022   
   
                      aPTT Coag (PPP) [Time] 30.0 s                25.1-36.5  [facility]  
   
                                                    Albumin [Mass/volume] in Ser  
um or PlasmaOrdered By: Bart Guzmán on 2022   
   
                      Albumin [Mass/Vol] 3.8 g/dL              3.2-5.5    [facility]  
   
                                                    Basophils Auto (Bld) [#/Vol]  
Ordered By: Bart Guzmán on 2022   
   
                                                    Basophils (Bld)   
[#/Vol]         0.0 10*3/uL                     0.0-0.2         [facility]  
   
                                                    Basophils/100 WBC Auto (Bld)  
Ordered By: Bart Guzmán on 2022   
   
                                                    Basophils/100 WBC   
(Bld)           0.5 %                           .               [facility]  
   
                                                    Blood anisocytosis detection  
Ordered By: Bart Guzmán on 2022   
   
                      Anisocytosis Ql (Bld) Moderate                         [facility]  
   
                                                    Blood hemoglobin measurement  
 (mass/volume)Ordered By: Bart Guzmán on   
2022   
   
                                                    Hemoglobin (Bld)   
[Mass/Vol]      10.2 g/dL                       11.8-15.4       [facility]  
   
                                                    Blood leukocytes automated c  
ount (number/volume)Ordered By: Bart Guzmán on   
2022   
   
                      WBC (Bld) [#/Vol] 7.3 10*3/uL            4.5-11.0   [facility]  
   
                                                    COVID-19 SOFIAOrdered By: Emerson Guzmán on 2022   
   
                                                    SARS-CoV+SARS-CoV-2   
(COVID-19) Ag IA.rapid   
Ql (Resp)       Negative                        Negative        [facility]  
   
                                        Comment on above:   This is a duplicate   
Zuleyka SARS Antigen (GILMER) result to be used  
   
for statistical tracking purpose only.   
   
                                                    Creatinine and Glomerular fi  
ltration rate.predicted panel (S/P/Bld)Ordered By:   
Bart Guzmán on 2022   
   
                      Creatinine [Mass/Vol] 0.91 mg/dL            0.44-1.03  [facility]  
   
                                                    Direct bilirubin measurement  
Ordered By: Bart Guzmán on 2022   
   
                                                    Bilirubin.direct   
[Mass/Vol]      0.3 mg/dL                       0.0-0.4         [facility]  
   
                                                    Eosinophils Auto (Bld) [#/Vo  
l]Ordered By: Bart Guzmán on 2022   
   
                                                    Eosinophils (Bld)   
[#/Vol]         0.0 10*3/uL                     0.0-0.45        [facility]  
   
                                                    Eosinophils/100 WBC Auto (Bl  
d)Ordered By: Bart Guzmán on 2022   
   
                                                    Eosinophils/100 WBC   
(Bld)           0.3 %                           .               [facility]  
   
                                                    Erythrocyte distribution wid  
th Auto (RBC) [Ratio]Ordered By: Bart Guzmán on   
2022   
   
                                                    Erythrocyte   
distribution width   
(RBC) [Ratio]   15.8 %                          11.9-15.3       [facility]  
   
                                                    Estimated glomerular filtrat  
ion rate (GFR) non- AmericanOrdered By: Bart Guzmán on 2022   
   
                                                    GFR/1.73 sq   
M.predicted among   
non-blacks MDRD   
(S/P/Bld) [Vol   
rate/Area]      60 mL/Min                                       [facility]  
   
                                                    Globulin Calc (S) [Mass/Vol]  
Ordered By: Bart Guzmán on 2022   
   
                                                    Globulin (S)   
[Mass/Vol]      2.8 g/dL                                        [facility]  
   
                                                    Helmet cell detectionOrdered  
 By: Bart Guzmán on 2022   
   
                                                    Helmet cells LM Ql   
(Bld)           Slight                                          [facility]  
   
                                                    Hematocrit Auto (Bld) [Volum  
e fraction]Ordered By: Bart Guzmán on 2022   
   
                                                    Hematocrit (Bld)   
[Volume fraction] 32.8 %                          34.0-46.4       [facility]  
   
                                                    Laboratory - Chemistry and C  
hemistry - challengeOrdered By: Bart Guzmán on   
2022   
   
                                                    Lipase [Catalytic   
activity/Vol]   26.0 U/L                        22-51           [facility]  
   
                                                    Natriuretic peptide B   
(Bld) [Mass/Vol] 577.0 pg/mL                     5-100           [facility]  
   
                                                    Laboratory - CoagulationOrde  
red By: Bart Guzmán on 2022   
   
                      PT Coag (PPP) [Time] 15.4 s                9.0-12.9   [facility]  
   
                                                    Laboratory - Hematology and   
Cell countsOrdered By: Bart Guzmán on 2022   
   
                                                    Nucleated RBC/100 WBC   
(Bld) [Ratio]   0.0 %                           0-0.5           [facility]  
   
                                                    Lymphocytes Auto (Bld) [#/Vo  
l]Ordered By: Bart Guzmán on 2022   
   
                                                    Lymphocytes (Bld)   
[#/Vol]         0.4 10*3/uL                     1.00-4.8        [facility]  
   
                                                    Lymphocytes/100 WBC Auto (Bl  
d)Ordered By: Bart Guzmán on 2022   
   
                                                    Lymphocytes/100 WBC   
(Bld)           5.7 %                           .               [facility]  
   
                                                    MCH Auto (RBC) [Entitic mass  
]Ordered By: Bart Guzmán on 2022   
   
                                                    MCH (RBC) [Entitic   
mass]           20.9 pg                         24.7-34.3       [facility]  
   
                                                    MCHC Auto (RBC) [Mass/Vol]Or  
dered By: Bart Guzmán on 2022   
   
                      MCHC (RBC) [Mass/Vol] 31.3 g/dL             32.0-35.0  [facility]  
   
                                                    MCV Auto (RBC) [Entitic vol]  
Ordered By: Bart Guzmán on 2022   
   
                                                    MCV (RBC) [Entitic   
vol]            66.7 fL                                   [facility]  
   
                                                    Monocytes Auto (Bld) [#/Vol]  
Ordered By: Bart Guzmán on 2022   
   
                                                    Monocytes (Bld)   
[#/Vol]         0.6 10*3/uL                     0.0-0.8         [facility]  
   
                                                    Monocytes/100 WBC Auto (Bld)  
Ordered By: Bart Guzmán on 2022   
   
                                                    Monocytes/100 WBC   
(Bld)           7.9 %                           .               [facility]  
   
                                                    Neutrophils Auto (Bld) [#/Vo  
l]Ordered By: Bart Guzmán on 2022   
   
                                                    Neutrophils (Bld)   
[#/Vol]         6.3 10*3/uL                     1.8-7.7         [facility]  
   
                                                    Neutrophils/100 WBC Auto (Bl  
d)Ordered By: Bart Guzmán on 2022   
   
                                                    Neutrophils/100 WBC   
(Bld)           85.6 %                          .               [facility]  
   
                                                    No Panel InformationOrdered   
By: Bart Guzmán on 2022   
   
                                                    Estimated GFR (   
American)       > 60 mL/Min                                     [facility]  
   
                                        Comment on above:   GFR estimated refere  
nce range: According to KDOQI guidelines,   
<60 ml/min/1.73m2 is sufficient to diagnose a patient with   
chronic kidney disease.   
   
                      Large Platelets Slight                           [facility]  
   
                                                    Pharmacy Creatinine   
Clearance (Chem 43.43                                           [facility]  
   
                      Platelet Estimate Normal                Normal     [facility]  
   
                                                    Platelet Morphology   
Comment         N/A                                             [facility]  
   
                      Poikilocytosis Moderate                         [facility]  
   
                      Schistocytes Slight                           [facility]  
   
                      SARS Antigen (LFIA)                                  [facility]  
   
                                                    Platelet mean volume Auto (B  
ld) [Entitic vol]Ordered By: Bart Guzmán on   
2022   
   
                                                    Platelet mean volume   
(Bld) [Entitic vol] 8.2 fL                          6.3-10.7        [facility]  
   
                                                    Platelet poor plasma interna  
tional normalized ratio (INR) by coagulation assay   
(relatOrdered By: Bart Guzmán on 2022   
   
                                                    INR Coag (PPP)   
[Relative time] 1.4 {INR}                                       [facility]  
   
                                        Comment on above:   INR Therapeutic Rang  
e  
A) Pre- and Peroperative OAT started two weeks before surgery.   
NOT HIP SURGERY: 1.5 - 2.5  
HIP SURGERY: 2 - 3  
B) Primary and secondary prevention of venous  
THROMBOSIS: 2 - 3  
C) Active venous thrombosis, pulmonary embolism  
and prevention of recurrent venous thrombosis: 2 - 3  
D) Prevention of arterial thromboembolism  
including patients with mechanical heart valves: 3 - 4.5   
   
                                                    Platelets Auto (Bld) [#/Vol]  
Ordered By: Bart Guzmná on 2022   
   
                                                    Platelets (Bld)   
[#/Vol]         230 10*3/uL                     150-450         [facility]  
   
                                                    Protein [Mass/volume] in Ser  
um or PlasmaOrdered By: Bart Guzmán on 2022   
   
                      Protein [Mass/Vol] 6.6 g/dL              6.1-7.9    [facility]  
   
                                                    RBC Auto (Bld) [#/Vol]Ordere  
d By: Bart Guzmán on 2022   
   
                      RBC (Bld) [#/Vol] 4.91 10*6/uL            3.60-5.00  [facility]  
   
                                                    RBC morphologyOrdered By: Emerson Guzmán on 2022   
   
                                                    RBC morphology finding   
Nom (Bld)       N/A                                             [facility]  
   
                                                    Serum or plasma alanine amin  
otransferase measurement without P-5'-P (enzymatic   
activiOrdered By: Bart Guzmán on 2022   
   
                                                    ALT No additional   
P-5'-P [Catalytic   
activity/Vol]   23 U/L                          10-60           [facility]  
   
                                                    Serum or plasma albumin/glob  
ulin mass ratioOrdered By: Bart Guzmán on   
2022   
   
                                                    Albumin/Globulin [Mass   
ratio]          1.4 {ratio}                                     [facility]  
   
                                                    Serum or plasma alkaline irene  
sphatase measurement (enzymatic   
activity/volume)Ordered   
By: Bart Guzmán on 2022   
   
                                                    ALP [Catalytic   
activity/Vol]   50 U/L                          32-92           [facility]  
   
                                                    Serum or plasma aspartate am  
inotransferase measurement (enzymatic   
activity/volume)Ordered By: Bart Guzmán on 2022   
   
                                                    AST [Catalytic   
activity/Vol]   24 U/L                          10-42           [facility]  
   
                                                    Serum or plasma calcium tomas  
urement (mass/volume)Ordered By: Bart Guzmán on   
2022   
   
                      Calcium [Mass/Vol] 9.0 mg/dL             8.2-10.2   [facility]  
   
                                                    Serum or plasma chloride breana  
surement (moles/volume)Ordered By: Bart Guzmán on   
2022   
   
                      Chloride [Moles/Vol] 101 mmol/L                 [facility]  
   
                                                    Serum or plasma glucose tomas  
urement (mass/volume)Ordered By: Bart Guzmán on   
2022   
   
                      Glucose [Mass/Vol] 113 mg/dL                  [facility]  
   
                                        Comment on above:   ADA recommended refe  
rence range  
Random Glucose Reference Range is dependent on time and content   
of last meal. Glucose of more than 200 mg/dL in a nonstressed,   
ambulatory subject supports the diagnosis of Diabetes Mellitus.   
   
                                                    Serum or plasma non-glucuron  
idated bilirubin measurement (mass/volume)Ordered   
By:   
Bart Guzmán on 2022   
   
                                                    Bilirubin.indirect   
[Mass/Vol]      1.0 mg/dL                                       [facility]  
   
                                                    Serum or plasma potassium me  
asurement (moles/volume)Ordered By: Bart Guzmán on  
   
2022   
   
                      Potassium [Moles/Vol] 3.8 mmol/L            3.5-5.1    [facility]  
   
                                                    Serum or plasma sodium measu  
rement (moles/volume)Ordered By: Bart Guzmán on   
2022   
   
                      Sodium [Moles/Vol] 135 mmol/L            136-146    [facility]  
   
                                                    Serum or plasma total biliru  
bin measurement (mass/volume)Ordered By: Bart Guzmán on   
2022   
   
                      Bilirubin [Mass/Vol] 1.3 mg/dL             0.3-1.2    [facility]  
   
                                        Comment on above:   Samples from patient  
s who have taken Naproxen have shown   
spurious elevation in Total Bilirubin levels. A metabolite of   
Naproxen, O-desmethylnaproxen, has been shown to interfere with   
the Valeriano method for measuring Total Bilirubin.   
   
                                                    Serum or plasma total carbon  
 dioxide measurement (moles/volume)Ordered By: Bart Guzmán on 2022   
   
                      CO2 [Moles/Vol] 24.1 mmol/L            22.0-30.0  [facility]  
   
                                                    Serum or plasma urea nitroge  
n measurement (mass/volume)Ordered By: Bart Guzmán  
 on   
2022   
   
                                                    Urea nitrogen   
[Mass/Vol]      11 mg/dL                        9-            [facility]  
   
                                                    Teardrop cell detectionOrder  
ed By: Bart Guzmán on 2022   
   
                      Dacrocytes LM Ql (Bld) Slight                           Fi  
[facility]  
   
                                                    Troponin I.cardiac [Mass/vol  
ume] in Serum or Plasma by High sensitivity   
methodOrdered   
By: Bart Guzmán on 2022   
   
                                                    Troponin I.cardiac   
High sensitivity   
method [Mass/Vol] 10 pg/mL                        0-15            [facility]  
   
                                                    XR knee LT 2Von 06-   
   
                                        XR knee LT 2V       [facility]   
Professional   
Corporation  
Other Phone:   
[phone]  
   
                      XR knee LT 2V [facility]  
 [facility]   
Professional   
Corporation  
Other Phone:   
[phone]  
   
                      XR knee LT 2V [address] [place] [address] [place]   
Professional   
Corporation  
Other Phone:   
[phone]  
   
                      XR knee LT 2V [place] [id number]                       [facility]   
Professional   
Corporation  
Other Phone:   
[phone]  
   
                      XR knee LT 2V XRay Report                       [facility]  
t   
Professional   
Corporation  
Other Phone:   
[phone]  
   
                      XR knee LT 2V Signed                           [facility]  
   
Professional   
Corporation  
Other Phone:   
[phone]  
   
                                        XR knee LT 2V       Patient: Martinez Quick   
MR#: [id number]                                                [facility]   
[facility]   
Corporation  
Other Phone:   
[phone]  
   
                      XR knee LT 2V 839                              [facility]  
   
Professional   
Corporation  
Other Phone:   
[phone]  
   
                                        XR knee LT 2V       : 1943   
Acct:[id number]                                             [facility]   
Professional   
Corporation  
Other Phone:   
[phone]  
   
                                        XR knee LT 2V       Age/Sex: 78 / F ADM   
Date:   
06/15/22                                                    [facility]   
Professional   
Corporation  
Other Phone:   
[phone]  
   
                                        XR knee LT 2V       Loc: SOXD Room: Type  
: REG   
CLI                                                         [facility]   
Professional   
Corporation  
Other Phone:   
[phone]  
   
                                        XR knee LT 2V       Attending Dr: Andrew Freeman DO                                                   [facility]   
Professional   
Corporation  
Other Phone:   
[phone]  
   
                                        XR knee LT 2V       Copies to: Andrew Freeman, DO                                                  [facility]   
Professional   
Corporation  
Other Phone:   
[phone]  
   
                                        XR knee LT 2V       Ordering Provider: MARIBEL Freeman DO                                                [facility]   
Professional   
Corporation  
Other Phone:   
[phone]  
   
                      XR knee LT 2V Date of Service: 06/15/22                     
    [facility]   
Professional   
[employer]poration  
Other Phone:   
[phone]  
   
                                        XR knee LT 2V       Accession #:   
[id number]) XR/XR knee   
LT 2V: Primary   
osteoarthritis of left   
knee                                                        [facility]   
Professional   
[employer]poration  
Other Phone:   
[phone]  
   
                      XR knee LT 2V LEFT KNEE - 2 views                       No  
[facility] [employer]  
Other Phone:   
[phone]  
   
                                        XR knee LT 2V       CLINICAL HISTORY: St  
atus   
post manipulation left   
total knee arthroplasty.                                         [facility]   
Professional   
[employer]poration  
Other Phone:   
[phone]  
   
                                        XR knee LT 2V       COMPARISON: Left kne  
e   
series 2022                                            [facility]   
Professional   
Corporation  
Other Phone:   
[phone]  
   
                      XR knee LT 2V FINDINGS:                        [facility]  
   
Professional   
Corporation  
Other Phone:   
[phone]  
   
                                        XR knee LT 2V       Moderate size joint   
effusion is noted. No   
acute bony process. No   
evidence of hardware                                         [facility]   
Professional   
Corporation  
Other Phone:   
[phone]  
   
                      XR knee LT 2V complication.                       [facility]   
Professional   
Corporation  
Other Phone:   
[phone]  
   
                                        XR knee LT 2V       ORDER #: [id number] X  
R/XR   
knee LT 2V                                                  [facility] [place]   
Professional   
Corporation  
Other Phone:   
[phone]  
   
                      XR knee LT 2V IMPRESSION:                       [facility]  
t   
Professional   
Corporation  
Other Phone:   
[phone]  
   
                                        XR knee LT 2V       MODERATE JOINT EFFUS  
ION.   
NO RADIOGRAPHIC HARDWARE   
COMPLICATION.                                               [facility]   
Professional   
Corporation  
Other Phone:   
[phone]  
   
                                        XR knee LT 2V       Impression dictated   
by:   
Nehemiah Anderson Jr.,   
D.OCarlos6/15/2022 3:56 PM                                         [facility]   
Professional   
Corporation  
Other Phone:   
[phone]  
   
                                        XR knee LT 2V       Dictation Location:   
[place] [address]                                                 [facility]   
Professional   
Corporation  
Other Phone:   
[phone]  
   
                                        XR knee LT 2V       Transcribed By: CARMELO   
06/15/22 [facility]                                               [place]   
Professional   
Corporation  
Other Phone:   
[phone]  
   
                                        XR knee LT 2V       Dictated By: Nehemiah Anderson Jr, DO 06/15/22   
[facility]                                                        [place]   
Professional   
Corporation  
Other Phone:   
[phone]  
   
                      XR knee LT 2V Signed By:                       [facility]  
   
Professional   
Corporation  
Other Phone:   
[phone]  
   
                      XR knee LT 2V 06/15/22 1556                       [facility]   
Professional   
Corporation  
Other Phone:   
[phone]  
   
                                                    XR knee LT 3Von 2021   
   
                                        XR knee LT 3V       [facility]   
Professional   
Corporation  
Other Phone:   
[phone]  
   
                      XR knee LT 3V [facility]  
 [facility]   
Professional   
Corporation  
Other Phone:   
[phone]  
   
                      XR knee LT 3V [address] [facility]   
Professional   
Corporation  
Other Phone:   
[phone]  
   
                      XR knee LT 3V [place] [address]                       [facility]   
Professional   
Corporation  
Other Phone:   
[phone]  
   
                      XR knee LT 3V XRay Report                       [facility]  
t   
Professional   
Corporation  
Other Phone:   
[phone]  
   
                      XR knee LT 3V Signed                           [facility]  
   
Professional   
Corporation  
Other Phone:   
[phone]  
   
                                        XR knee LT 3V       Patient: Martinez Quick   
MR#: [id number]                                                [facility]   
Professional   
Corporation  
Other Phone:   
[phone]  
   
                      XR knee LT 3V 839                              [facility]  
   
Professional   
Corporation  
Other Phone:   
[phone]  
   
                                        XR knee LT 3V       : 1943   
Acct:[id number]                                             [facility]   
Professional   
Corporation  
Other Phone:   
[phone]  
   
                                        XR knee LT 3V       Age/Sex: 78 / F ADM   
Date:   
21                                                    [facility]   
Professional   
Corporation  
Other Phone:   
[phone]  
   
                                        XR knee LT 3V       Loc: SOXD Room: Type  
: REG   
CLI                                                         [facility]   
Professional   
Corporation  
Other Phone:   
[phone]  
   
                                        XR knee LT 3V       Attending Dr: Andrew Freeman DO                                                   [facility]   
Professional   
Corporation  
Other Phone:   
[phone]  
   
                                        XR knee LT 3V       Ordering Provider: MARIBEL Freeman DO                                                [facility]   
Professional   
Corporation  
Other Phone:   
[phone]  
   
                      XR knee LT 3V Date of Service: 21                     
    [facility]   
Professional   
Corporation  
Other Phone:   
[phone]  
   
                                        XR knee LT 3V       Accession #:   
[id number] XR/XR knee   
LT 3V - NOT FOR ER USE:   
Pain in left knee                                           [facility]   
Professional   
Corporation  
Other Phone:   
[phone]  
   
                                        XR knee LT 3V       Copies to: Andrew Freeman DO                                                  [facility]   
Professional   
Corporation  
Other Phone:   
[phone]  
   
                      XR knee LT 3V Left knee 11/3/2021.                       [facility]  
[place]   
[facility]   
Corporation  
Other Phone:   
[phone]  
   
                                        XR knee LT 3V       CLINICAL DATA: Long   
history of left knee   
pain.                                                       [facility]   
Professional   
Corporation  
Other Phone:   
[phone]  
   
                                        XR knee LT 3V       FINDINGS: Standing   
frontal and lateral views   
of the left knee were   
obtained along with a   
sunrise                                                     [facility]   
Professional   
Corporation  
Other Phone:   
[phone]  
   
                      XR knee LT 3V view of both patellas.                        
 [facility]   
[facility]   
[facility]poration  
Other Phone:   
[phone]  
   
                                        XR knee LT 3V       Osteopenia is noted.  
 No   
acute fracture or   
dislocation is   
identified. There is   
joint space narrowing                                         [facility]   
Professional   
Corporation  
Other Phone:   
[phone]  
   
                                        XR knee LT 3V       in the medial   
femorotibial compartment.   
Marginal osteophyte   
formation is seen in this   
location and                                                [facility]   
Professional   
Corporation  
Other Phone:   
[phone]  
   
                                        XR knee LT 3V       in the patellofemora  
l   
compartment. No bony   
erosion or destruction is   
visualized. There is a   
small                                                       [facility]   
Professional   
Corporation  
Other Phone:   
[phone]  
   
                      XR knee LT 3V suprapatellar effusion.                       
  [facility]   
Professional   
[facility]poration  
Other Phone:   
[phone]  
   
                                        XR knee LT 3V       ORDER #: [id number] X  
R/XR   
knee LT 3V - NOT FOR ER   
USE                                                         [facility]   
Professional   
Corporation  
Other Phone:   
[phone]  
   
                                        XR knee LT 3V       IMPRESSION: Osteopen  
ia.   
Degenerative changes and   
small effusion.                                             [facility]   
Professional   
Corporation  
Other Phone:   
[phone]  
   
                                        XR knee LT 3V       Impression dictated   
by:   
Cuong Jacobo Jr., M.D.11/3/2021 3:05 PM                                         [facility]   
Professional   
Corporation  
Other Phone:   
[phone]  
   
                                        XR knee LT 3V       Dictation Location:   
[place] [address]                                                 [facility]   
Professional   
Corporation  
Other Phone:   
[phone]  
   
                                        XR knee LT 3V       Transcribed By: CARMELO   
21 1505                                               [facility]   
Professional   
Corporation  
Other Phone:   
[phone]  
   
                                        XR knee LT 3V       Dictated By:   
Cuong Jacobo Jr, MD 21 1502                                            [facility]   
Professional   
Corporation  
Other Phone:   
[phone]  
   
                      XR knee LT 3V Signed By:                       [facility]  
   
Professional   
Corporation  
Other Phone:   
[phone]  
   
                      XR knee LT 3V 21 1508                       [place]  
[place]   
[facility]   
Corporation  
Other Phone:   
[phone]  
   
                                                    Tobacco Screening.on 10-12-2  
021   
   
                                        Fall risk assessment b) One or more fall  
s in   
the last year                                               [facility]   
[facility]  
y [address] DO  
Work Phone:   
[phone]  
0  
   
                                                    Tobacco use status   
[facility]            b) No                                           [facility]   
[facility]  
y [address] DO  
Work Phone:   
[phone]  
0  
   
                                                    CNPNon 2021   
   
                                        CNPN                Telephone (OPHTLN)  
-------------------------  
-----  
JESSICA QUICK   
([id number]) 1943 F  
Date Time Provider   
Department  
21 SHUBHAM EL  
During your visit today,   
we recorded the following   
information about you:  
Ayan Molina   
[facility] 2021 11:16 AM   
Signed  
Called patient to   
reschedule her   
appointment with Dr. El, for glaucoma.  
Ayan Molina   
[facility] 2021 2:19 PM   
Signed  
Called patient to   
schedule with Dr. El,   
since per Dr. Dietz he   
doesn't do  
[facility].  
Ayan Molina   
[facility] 2021 2:33 PM   
Signed  
Patient is rescheduled.  
Allergies As of Date:   
2021  
(No Known Allergies)  
Date Reviewed: 2015  
Reviewed by: Alva Galvez (Ma) - Fully Assessed  
Reason for Visit:  
Appointment [186]  
Prescriptions as of   
2021  
- amiodarone (PACERONE)   
200 mg tablet  
- atorvastatin (LIPITOR)   
80 mg tablet  
- carvedilol (COREG) 6.25   
mg tablet  
- clopidogrel (PLAVIX) 75   
mg tablet  
- latanoprost (XALATAN)   
0.005 % ophthalmic   
solution  
- lisinopril (ZESTRIL,   
PRINIVIL) 5 mg tablet  
- NITROSTAT 0.4 mg SL   
tablet  
- spironolactone   
(ALDACTONE) 25 mg tablet  
- warfarin (COUMADIN) 2   
mg tablet  
- warfarin (JANTOVEN) 4   
mg tablet  
Take 4 mg by mouth daily   
as directed.  
- aspirin, enteric coated   
(ASPIRIN, ENTERIC COATED)   
81 mg EC tablet  
Take 81 mg by mouth once   
daily.  
- cyclobenzaprine   
(FLEXERIL) 10 mg tablet  
Take 10 mg by mouth twice   
daily as needed.  
- lansoprazole (PREVACID)   
15 mg capsule  
Take 15 mg by mouth once   
daily.  
- metoprolol succinate ER   
(TOPROL XL) 100 mg Tb24  
Take 100 mg by mouth.  
- gabapentin (NEURONTIN)   
300 mg capsule  
Take 300 mg by mouth   
three times daily.  
- Hydrochlorothiazide   
12.5 mg capsule  
Take 12.5 mg by mouth   
once daily.  
- famotidine (PEPCID) 20   
mg tablet  
Take 20 mg by mouth once   
daily.  
- CALCIUM   
CARBONATE/VITAMIN D3   
(CALCIUM 500 + D ORAL)  
Take by mouth.  
Problem List As Of Date   
2021 Noted Resolved  
Primary osteoarthritis of   
both knees [M17.0]   
2015  
Encounter Number:   
[id number]  
Encounter Status:Closed   
by AYAN SALINAS on 21  Normal                                  [facility]  
   
                                                    XR RIBS LT PA Geeta   
9   
   
                                        XR RIBS LT PA     Patient: TAL QUICK Exam Date: 2019  
: 1943 Gender:F   
MRN: [id number]  
Ordering : DR ERWIN GONSALES   
. Admission #: [id number]  
Family : Order #:   
[id number]  
CLICK HERE TO VIEW EXAM  
  
RADIOLOGY REPORT  
  
PROCEDURE: RADIOGRAPH   
RIBS LEFT PA CHEST  
  
COMPARISON: XR CHEST 2 V,   
2015.  
  
INDICATIONS: Acute left   
anterior chest pain after   
injury  
  
FINDINGS:  
  
LUNGS: No significant   
pulmonary parenchymal   
abnormalities.  
PLEURA: No pneumothorax,   
effusion, or pleural   
thickening.  
MEDIASTINUM: No visible   
mass or adenopathy.  
CARDIAC: No cardiomegaly   
or cardiac silhouette   
abnormality.  
  
RIBS: No fracture or   
visible bone lesion.  
OTHER: Moderate scoliosis   
of the thoracic spine.  
  
CONCLUSION:  
1. No acute   
cardiopulmonary process.  
2. No visible rib   
fracture.  
  
  
Dictated by: Tacos Perez M.D. on   
2019 at 13:32  
Approved by: Tacos Perez M.D. on   
2019 at 13:36 Normal                                  [facility]  
   
                                                    CBC AUTO DIFFon 10-   
   
                                                    Basophils (Bld)   
[#/Vol]         0.0 103/ul      Normal          0.0-0.1         [facility]  
   
                                        Comment on above:   Performed By: #### C  
BC ####  
[facility]  
[address] [place], [place] [id number]  
Yana Barbara   
   
                                                    Basophils/100 WBC   
(Bld)           0.5 %           Normal          0.2-2.0         [facility]  
   
                                        Comment on above:   Performed By: #### C  
BC ####  
[facility]  
[address] [place] [address]  
Yana Barbara   
   
                                                    Eosinophils (Bld)   
[#/Vol]         0.3 103/ul      Normal          0.0-0.7         [facility]  
   
                                        Comment on above:   Performed By: #### C  
BC ####  
[facility]  
[address] [place] [address]  
Yana Barbara   
   
                                                    Eosinophils/100 WBC   
(Bld)           5.0 %           Normal          0.9-7.0         [facility]  
   
                                        Comment on above:   Performed By: #### C  
BC ####  
[facility]  
[address] [place] [address]  
Yana Barbara   
   
                                                    Erythrocyte   
distribution width   
(RBC) [Ratio]   15.9 %          Critically high 11.0-15.0       [facility]  
   
                                        Comment on above:   Performed By: #### C  
BC ####  
[facility]  
[address] [place] [address]  
Yana Barbara   
   
                                                    Hematocrit (Bld)   
[Volume fraction] 37.3 %          Normal          36.0-48.0       [facility]  
   
                                        Comment on above:   Performed By: #### C  
BC ####  
[facility]  
[address] [place] [address]  
Yana Barbara   
   
                                                    Hemoglobin (Bld)   
[Mass/Vol]      11.4 g/dL       Critically low  12.0-16.0       [facility]  
   
                                        Comment on above:   Performed By: #### C  
BC ####  
[facility]  
[address] [place] [address]  
Yana Barbara   
   
                      IG #       0.02 10e3/ul Normal     0.00-0.03  [facility]  
   
                                        Comment on above:   Performed By: #### C  
BC ####  
[facility]  
[address] [place] [id number]  
Yana Barbara   
   
                      IG %       0.3 %      Normal     0.0-0.5    [facility]  
   
                                        Comment on above:   Performed By: #### C  
BC ####  
[facility]  
[address] [place] [id number]  
Yana Barbara   
   
                                                    Lymphocytes (Bld)   
[#/Vol]         1.3 103/ul      Normal          1.2-3.8         [facility]  
   
                                        Comment on above:   Performed By: #### C  
BC ####  
[facility]  
[address] [place] [id number]  
Yana Barbara   
   
                                                    Lymphocytes/100 WBC   
(Bld)           22.3 %          Normal          20.5-60.0       [facility]  
   
                                        Comment on above:   Performed By: #### C  
BC ####  
[facility]  
[address] [place], [place] [address]  
Yana Barbara   
   
                      MANUAL DIFF REQ NO         Normal                [facility]  
   
                                        Comment on above:   Performed By: #### C  
BC ####  
[facility]  
[address] [place], [place] [address]  
Yana Barbara   
   
                                                    MCH (RBC) [Entitic   
mass]           21.2 pg         Critically low  26.7-34.0       [facility]  
   
                                        Comment on above:   Performed By: #### C  
BC ####  
[facility]  
[address] [place], [place] [address]  
Yana Barbara   
   
                      MCHC (RBC) [Mass/Vol] 30.6 g/dL  Normal     29.9-35.2  [facility]  
 [facility]  
   
                                        Comment on above:   Performed By: #### C  
BC ####  
[facility]  
[address] [place], [place] [address]  
Yana Barbara   
   
                                                    MCV (RBC) [Entitic   
vol]            69.2 fL         Critically low  81.0-99.0       [facility]  
   
                                        Comment on above:   Performed By: #### C  
BC ####  
[facility]  
[address] [place], [place] [address]  
Yana Barbara   
   
                                                    Monocytes (Bld)   
[#/Vol]         0.6 103/ul      Normal          0.3-0.8         [facility]  
   
                                        Comment on above:   Performed By: #### C  
BC ####  
[facility]  
[address] [place] [id number]  
Yana Barbara   
   
                                                    Monocytes/100 WBC   
(Bld)           9.5 %           Normal          1.7-12.0        [facility]  
   
                                        Comment on above:   Performed By: #### C  
BC ####  
[facility] [facility]  
[address], [place] [address]  
Yana Barbara   
   
                                                    Neutrophils (Bld)   
[#/Vol]         3.7 103/ul      Normal          1.4-6.5         [facility]  
   
                                        Comment on above:   Performed By: #### C  
BC ####  
[facility]  
[address], [place] [address]  
Yana Barbara   
   
                                                    Neutrophils/100 WBC   
(Bld)           62.4 %          Normal          43.0-75.0       [facility]  
   
                                        Comment on above:   Performed By: #### C  
BC ####  
[facility]  
[address] [place] [address]  
Yana Barbara   
   
                                                    Platelet mean volume   
(Bld) [Entitic vol] 10.0 fL         Normal          9.5-13.5        [facility]  
   
                                        Comment on above:   Performed By: #### C  
BC ####  
[facility]  
[address], [place] [address]  
Yana Barbara   
   
                                                    Platelets (Bld)   
[#/Vol]         244 103/ul      Normal          150-450         [facility]  
   
                                        Comment on above:   Performed By: #### C  
BC ####  
[facility]  
[address], [place] [address]  
Yana Barbara   
   
                      RBC (Bld) [#/Vol] 5.39 106/ul Normal     4.20-5.40  [facility]  
   
                                        Comment on above:   Performed By: #### C  
BC ####  
[facility]  
[address], [place] [address]  
Yana Barbara   
   
                      WBC (Bld) [#/Vol] 6.0 103/ul Normal     4.0-11.0   [facility]  
   
                                        Comment on above:   Performed By: #### C  
BC ####  
[facility]  
[address], [place] [address]  
Yanaheladio Aggarwalen   
   
                                                    PROF CHEM 8 (BAS METB)on 10-  
   
   
                      Anion gap [Moles/Vol] 8.7 mmol/L Normal                [facility]  
   
                                        Comment on above:   Performed By: #### B  
MP ####[facility] Sp[address]lnhliv6572   
[place], [place] [address]Uurvyg Barbara   
   
                      Calcium [Mass/Vol] 9.1 mg/dL  Normal     8.4-10.2   [facility]  
   
                                        Comment on above:   Performed By: #### B  
MP ####[facility] Xhdveqmeul7191   
[place], [place] [address]Mfbezj Barbara   
   
                      Chloride [Moles/Vol] 104 mmol/L Normal          The   
[facility]  
   
                                        Comment on above:   Performed By: #### B  
MP ####[facility] Cmhdnbeofv8059   
[place], [place] [address]Ulowdf Barbara   
   
                      CO2 [Moles/Vol] 30.8 mmol/L Critically high 22.0-30.0  [facility]  
   
                                        Comment on above:   Performed By: #### B  
MP ####[facility] Gpktstyucs1649   
[place], [place] [address]Dwfdwh Barbara   
   
                      Creatinine [Mass/Vol] 1.03 mg/dL Normal     0.52-1.04  [facility]  
   
                                        Comment on above:   Performed By: #### B  
MP ####[facility] Kpldzagjyf3444   
[place], [place] [address]Xvqdoi Barbara   
   
                      EGFR-AF AMERICAN >60        Normal     >=60       [facility]  
   
                                        Comment on above:   Performed By: #### B  
MP ####[facility] Xtbrbhtkpv4743   
[place], [place] [address]Cmxfmo Barbara   
   
                      EGFR-NON AF AMERICAN 52 mL/min/1.73m2 Critically low >=60   
      [facility]  
   
                                        Comment on above:   Performed By: #### B  
MP ####[facility] Xjtqgplwli1157   
[place], [place] [address]Twocpy Barbara   
   
                      Glucose [Mass/Vol] 118 mg/dL  Critically high      [facility]  
   
                                        Comment on above:   Performed By: #### B  
MP ####[facility] Hkerlcajwz6192   
[place], [place] [address]Lxwsyx Barbara   
   
                      Potassium [Moles/Vol] 3.5 mmol/L Normal     3.4-5.0    The  
 [facility]  
   
                                        Comment on above:   Performed By: #### B  
MP ####[facility] Ixxcshlewv1658   
[place], [place] [address]Ruirxg Barbara   
   
                      Sodium [Moles/Vol] 140 mmol/L Normal     137-145    [facility]  
   
                                        Comment on above:   Performed By: #### B  
MP ####[facility] Gbivcjnitc8352   
[place], [place] [address]Gusplw Barbara   
   
                                                    Urea nitrogen   
[Mass/Vol]      14.0 mg/dL      Normal          7.0-17.0        [facility]  
   
                                        Comment on above:   Performed By: #### B  
MP ####[facility] Zkmqbgwchk6161   
[place], [place] [address]Jgcvuc Barbara   
   
                                                    Urea   
nitrogen/Creatinine   
[Mass ratio]    13.6 mg/mg      Normal                          [facility]  
   
                                        Comment on above:   Performed By: #### B  
MP ####[facility] Vzfhdxrsjc3391   
[place], [place] [address]Liphga Barbara   
   
                                                    CBC AUTO DIFFon 2019   
   
                                                    Basophils (Bld)   
[#/Vol]         0.0 103/ul      Normal          0.0-0.1         [facility]  
   
                                        Comment on above:   Performed By: #### C  
BC ####  
[facility]  
[address], [place] [address]  
Yana Barbara   
   
                                                    Basophils/100 WBC   
(Bld)           0.6 %           Normal          0.2-2.0         [facility]  
   
                                        Comment on above:   Performed By: #### C  
BC ####  
[facility]  
[address], [place] [address]  
Yana Barbara   
   
                                                    Eosinophils (Bld)   
[#/Vol]         0.2 103/ul      Normal          0.0-0.7         [facility]  
   
                                        Comment on above:   Performed By: #### C  
BC ####  
[facility]  
[address], [place] [address]  
Yana Barbara   
   
                                                    Eosinophils/100 WBC   
(Bld)           2.5 %           Normal          0.9-7.0         [facility]  
   
                                        Comment on above:   Performed By: #### C  
BC ####  
[facility]  
[address], [place] [address]  
Yana Barbara   
   
                                                    Erythrocyte   
distribution width   
(RBC) [Ratio]   15.9 %          Critically high 11.0-15.0       [facility]  
   
                                        Comment on above:   Performed By: #### C  
BC ####  
[facility]  
[address] [place] [address]  
Yana Barbara   
   
                                                    Hematocrit (Bld)   
[Volume fraction] 33.4 %          Critically low  36.0-48.0       [facility]  
   
                                        Comment on above:   Performed By: #### C  
BC ####  
[facility]  
[address] [place] [address]  
Yana Barbara   
   
                                                    Hemoglobin (Bld)   
[Mass/Vol]      10.1 g/dL       Critically low  12.0-16.0       [facility]  
   
                                        Comment on above:   Performed By: #### C  
BC ####  
[facility]  
[address] [place] [address]  
Yana Barbara   
   
                      IG #       0.01 10e3/ul Normal     0.00-0.03  [facility]  
   
                                        Comment on above:   Performed By: #### C  
BC ####  
[facility]  
[address] [place] [address]  
Yana Barbara   
   
                      IG %       0.1 %      Normal     0.0-0.5    [facility]  
   
                                        Comment on above:   Performed By: #### C  
BC ####  
[facility]  
[address] [place] [address]  
Yana Barbara   
   
                                                    Lymphocytes (Bld)   
[#/Vol]         1.3 103/ul      Normal          1.2-3.8         [facility]  
   
                                        Comment on above:   Performed By: #### C  
BC ####  
[facility]  
[address] [place], [place] [address]  
Yana Barbara   
   
                                                    Lymphocytes/100 WBC   
(Bld)           18.1 %          Critically low  20.5-60.0       [facility]  
   
                                        Comment on above:   Performed By: #### C  
BC ####  
[facility]  
[address] [place], [place] [address]  
Yana Barbara   
   
                      MANUAL DIFF REQ NO         Normal                [facility]  
   
                                        Comment on above:   Performed By: #### C  
BC ####  
[facility]  
[address] [place], [place] [address]  
Yana Barbara   
   
                                                    MCH (RBC) [Entitic   
mass]           20.6 pg         Critically low  26.7-34.0       [facility]  
   
                                        Comment on above:   Performed By: #### C  
BC ####  
[facility]  
[address], [place] [address]  
Yana Barbara   
   
                      MCHC (RBC) [Mass/Vol] 30.2 g/dL  Normal     29.9-35.2  The  
 [facility]  
   
                                        Comment on above:   Performed By: #### C  
BC ####  
[facility]  
[address], [place] [address]  
Yana Barbara   
   
                                                    MCV (RBC) [Entitic   
vol]            68.0 fL         Critically low  81.0-99.0       [facility]  
   
                                        Comment on above:   Performed By: #### C  
BC ####  
[facility]  
[address] [place] [address]  
Yana Barbara   
   
                                                    Monocytes (Bld)   
[#/Vol]         0.6 103/ul      Normal          0.3-0.8         [facility]  
   
                                        Comment on above:   Performed By: #### C  
BC ####  
[facility]  
[address] [place], [place] [address]  
Yana Barbara   
   
                                                    Monocytes/100 WBC   
(Bld)           8.3 %           Normal          1.7-12.0        [facility]  
   
                                        Comment on above:   Performed By: #### C  
BC ####  
[facility]  
[address] [place] [address]  
Yana Barbara   
   
                                                    Neutrophils (Bld)   
[#/Vol]         5.1 103/ul      Normal          1.4-6.5         [facility]  
   
                                        Comment on above:   Performed By: #### C  
BC ####  
[facility]  
[address] [place] [address]  
Yana Barbara   
   
                                                    Neutrophils/100 WBC   
(Bld)           70.4 %          Normal          43.0-75.0       [facility]  
   
                                        Comment on above:   Performed By: #### C  
BC ####  
[facility]  
[address] [place] [address]  
Yana Barbara   
   
                                                    Platelet mean volume   
(Bld) [Entitic vol] 10.4 fL         Normal          9.5-13.5        [facility]  
   
                                        Comment on above:   Performed By: #### C  
BC ####  
[facility]  
[address] [place] [address]  
Yana Barbara   
   
                                                    Platelets (Bld)   
[#/Vol]         279 103/ul      Normal          150-450         [facility]  
   
                                        Comment on above:   Performed By: #### C  
BC ####  
[facility]  
[address], [place] [address]  
Yana Jimenez   
   
                      RBC (Bld) [#/Vol] 4.91 106/ul Normal     4.20-5.40  [facility]  
   
                                        Comment on above:   Performed By: #### C  
BC ####  
[facility]  
[address], [place] [address]  
Yana Jimenez   
   
                      WBC (Bld) [#/Vol] 7.2 103/ul Normal     4.0-11.0   [facility]  
   
                                        Comment on above:   Performed By: #### C  
BC ####  
[facility]  
[address], [place] [address]  
Yana Jimenez   
   
                                                    CT FACIAL BONES W CONon    
   
                                        CT FACIAL BONES W CON [address] [place] [address]  
Phone [phone]  
  
Patient: JESSICA QUICK Exam Date: 2019  
: 1943 Gender:F   
MRN: [id number]  
Ordering : DR. BLAIR GALEANO M.D. Admission #:   
[id number]  
Family : Order #:   
[id number]  
CLICK HERE TO VIEW EXAM  
  
RADIOLOGY REPORT  
  
CT OF THE FACIAL BONES   
WITH IV CONTRAST 19:  
  
Sagittal and coronal   
reconstruction images   
performed.  
  
HISTORY: Acute left side   
facial pain and swelling   
for three days.  
  
FINDINGS: Mild to   
moderate stranding   
identified in the mid to   
lower left  
anterolateral face on the   
left side most consistent   
with edema and/or  
cellulitis. No abscess   
seen. No hematoma.  
  
Minimal mucosal   
thickening in the ethmoid   
air cells. No fracture or   
foreign  
body. No air in the soft   
tissues.  
  
IMPRESSION:  
1. MILD TO MODERATE   
STRANDING AND SOFT TISSUE   
SWELLING NOTED ALONG THE   
MID TO  
LOWER LEFT ANTEROLATERAL   
FACE MOST CONSISTENT WITH   
EDEMA AND/OR CELLULITIS.  
2. NO ABSCESS OR   
HEMATOMA.  
3. NO ACUTE SINUSITIS.  
4. NO FRACTURE OR FOREIGN   
BODY.  
  
Dictated by: Orquidea ARELLANO on 2019 at 05:36  
Transcribed by: Jessica on   
2019 at 12:54  
Approved by: Laurent Irwin M.D. on 2019   
at 04:09            Normal                                  [facility]  
   
                                                    PROF CHEM 8 (BAS METB)on    
   
                      Anion gap [Moles/Vol] 12.2 mmol/L Normal                Th  
[facility]  
   
                                        Comment on above:   Performed By: #### B  
MP ####  
[facility]  
[address] [place] [address]  
Yana Barbara   
   
                      Calcium [Mass/Vol] 9.3 mg/dL  Normal     8.4-10.2   [facility]  
   
                                        Comment on above:   Performed By: #### B  
MP ####  
[facility]  
[address] [place] [address]  
Yana Barbara   
   
                      Chloride [Moles/Vol] 106 mmol/L Normal          [facility]  
   
                                        Comment on above:   Performed By: #### B  
MP ####  
[facility]  
[address] [place] [address]  
Yana Barbara   
   
                      CO2 [Moles/Vol] 25.7 mmol/L Normal     22.0-30.0  [facility]  
   
                                        Comment on above:   Performed By: #### B  
MP ####  
[facility]  
[address] [place] [address]  
Yana Barbara   
   
                      Creatinine [Mass/Vol] 1.01 mg/dL Normal     0.52-1.04  [facility]  
   
                                        Comment on above:   Performed By: #### B  
MP ####  
[facility]  
[address] [place] [address]  
Yana Barbara   
   
                      EGFR-AF AMERICAN >60        Normal     >=60       [facility]  
   
                                        Comment on above:   Performed By: #### B  
MP ####  
[facility]  
[address] [place] [address]  
Yana Barbara   
   
                      EGFR-NON AF AMERICAN 54 mL/min/1.73m2 Critically low >=60   
      [facility]  
   
                                        Comment on above:   Performed By: #### B  
MP ####  
[facility]  
[address] [place] [address]  
Yana Barbara   
   
                      Glucose [Mass/Vol] 105 mg/dL  Normal          [facility]  
   
                                        Comment on above:   Performed By: #### B  
MP ####  
[facility]  
[address] [place] [address]  
Yana Barbara   
   
                      Potassium [Moles/Vol] 3.9 mmol/L Normal     3.4-5.0    [facility]  
   
                                        Comment on above:   Performed By: #### B  
ABE ####  
[facility]  
[address], [place] [address]  
Yana Barbara   
   
                      Sodium [Moles/Vol] 140 mmol/L Normal     137-145    [facility]  
   
                                        Comment on above:   Performed By: #### B  
ABE ####  
[facility]  
[address], [place] [address]  
Yana Barbara   
   
                                                    Urea nitrogen   
[Mass/Vol]      11.0 mg/dL      Normal          7.0-17.0        [facility]  
   
                                        Comment on above:   Performed By: #### B  
ABE ####  
[facility]  
[address] [place] [id number]  
Yana Barbara   
   
                                                    Urea   
nitrogen/Creatinine   
[Mass ratio]    10.9 mg/mg      Normal                          [facility]  
   
                                        Comment on above:   Performed By: #### B  
ABE ####  
[facility]  
[address] [place], [place] [address]  
Yana Barbara   
   
                                                    CARDIAC ROSA ADMITon   
019   
   
                                                    CK [Catalytic   
activity/Vol]   99 U/L          Normal                    [facility]  
   
                                        Comment on above:   Performed By: #### B  
ETTA FISH ####  
[facility]  
[address] [place] [id number]  
Yana Barbara   
   
                      CK.MB [Mass/Vol] 1.29 ng/mL Normal     <=2.37     [facility]  
   
                                        Comment on above:   Performed By: #### B  
ABE, ETTA ####  
[facility]  
[address] [place] [id number]  
Yana Barbara   
   
                                                    INR Coag (Bld)   
[Relative time] SEE BELOW       Normal                          [facility]  
   
                                        Comment on above:   Result Comment: <0.0  
34 ng/ml NEGATIVE 0.034-0.119   
INDETERMINATE   
0.120 AMI CUT OFF   
   
                                                            Performed By: #### B  
ETTA FISH ####  
[facility]  
[address] [place], [place] [address]  
Yana Barbara   
   
                      LUCRETIA        65.0 ng/mL Critically high <=61.5     [facility]  
   
                                        Comment on above:   Performed By: #### B  
ABE, ETTA ####  
[facility]  
[address] [place] [id number]  
Yana Barbara   
   
                      TROP       <0.017     Normal     <=0.034    [facility]  
   
                                        Comment on above:   Performed By: #### B  
MP, CMADM ####  
[facility]  
[address] [place], [place] [address]  
Yana Barbara   
   
                                                    CBC AUTO DIFFon 2019   
   
                                                    Basophils (Bld)   
[#/Vol]         0.0 103/ul      Normal          0.0-0.1         [facility]  
   
                                        Comment on above:   Performed By: #### C  
BC ####  
[facility]  
[address] [place] [address]  
Yana Barbara   
   
                                                    Basophils/100 WBC   
(Bld)           1.0 %           Normal          0.2-2.0         [facility]  
   
                                        Comment on above:   Performed By: #### C  
BC ####  
[facility]  
[address] [place] [address]  
Yana Barbara   
   
                                                    Eosinophils (Bld)   
[#/Vol]         0.1 103/ul      Normal          0.0-0.7         [facility]  
   
                                        Comment on above:   Performed By: #### C  
BC ####  
[facility]  
[address] [place] [address]  
Yana Barbara   
   
                                                    Eosinophils/100 WBC   
(Bld)           3.4 %           Normal          0.9-7.0         [facility]  
   
                                        Comment on above:   Performed By: #### C  
BC ####  
[facility]  
[address] [place] [address]  
Yana Barbara   
   
                                                    Erythrocyte   
distribution width   
(RBC) [Ratio]   15.2 %          Critically high 11.0-15.0       [facility]  
   
                                        Comment on above:   Performed By: #### C  
BC ####  
[facility]  
[address] [place] [address]  
Yana Barbara   
   
                                                    Hematocrit (Bld)   
[Volume fraction] 33.2 %          Critically low  36.0-48.0       [facility]  
   
                                        Comment on above:   Performed By: #### C  
BC ####  
[facility]  
[address] [place], [place] [address]  
Yana Barbara   
   
                                                    Hemoglobin (Bld)   
[Mass/Vol]      10.1 g/dL       Critically low  12.0-16.0       [facility]  
   
                                        Comment on above:   Performed By: #### C  
BC ####  
[facility]  
[address] [place] [address]  
Yana Barbara   
   
                      IG #       0.00 10e3/ul Normal     0.00-0.03  [facility]  
   
                                        Comment on above:   Performed By: #### C  
BC ####  
[facility]  
[address] [place], [place] [address]  
Yana Barbara   
   
                      IG %       0.0 %      Normal     0.0-0.5    [facility]  
   
                                        Comment on above:   Performed By: #### C  
BC ####  
[facility]  
[address] [place] [address]  
Yana Barbara   
   
                                                    Lymphocytes (Bld)   
[#/Vol]         1.1 103/ul      Critically low  1.2-3.8         [facility]  
   
                                        Comment on above:   Performed By: #### C  
BC ####  
[facility]  
[address] [place] [address]  
Yana Barbara   
   
                                                    Lymphocytes/100 WBC   
(Bld)           29.1 %          Normal          20.5-60.0       [facility]  
   
                                        Comment on above:   Performed By: #### C  
BC ####  
[facility]  
[address] [place] [address]  
Yana Barbara   
   
                      MANUAL DIFF REQ NO         Normal                [facility]  
   
                                        Comment on above:   Performed By: #### C  
BC ####  
[facility]  
[address] [place], [place] [id number]  
Yana Barbara   
   
                                                    MCH (RBC) [Entitic   
mass]           20.7 pg         Critically low  26.7-34.0       [facility]  
   
                                        Comment on above:   Performed By: #### C  
BC ####  
[facility]  
[address] [place] [address]  
Yana Barbara   
   
                      MCHC (RBC) [Mass/Vol] 30.4 g/dL  Normal     29.9-35.2  [facility]  
 [facility]  
   
                                        Comment on above:   Performed By: #### C  
BC ####  
[facility]  
[address] [place], [place] [id number]  
Yana Barbara   
   
                                                    MCV (RBC) [Entitic   
vol]            68.0 fL         Critically low  81.0-99.0       [facility]  
   
                                        Comment on above:   Performed By: #### C  
BC ####  
[facility]  
[address] [place], [place] [address]  
Yana Barbara   
   
                                                    Monocytes (Bld)   
[#/Vol]         0.4 103/ul      Normal          0.3-0.8         [facility]  
   
                                        Comment on above:   Performed By: #### C  
BC ####  
[facility]  
[address] [place], [place] [address]  
Yana Barbara   
   
                                                    Monocytes/100 WBC   
(Bld)           10.8 %          Normal          1.7-12.0        [facility]  
   
                                        Comment on above:   Performed By: #### C  
BC ####  
[facility]  
[address] [place], [place] [address]  
Yana Barbara   
   
                                                    Neutrophils (Bld)   
[#/Vol]         2.1 103/ul      Normal          1.4-6.5         [facility]  
   
                                        Comment on above:   Performed By: #### C  
BC ####  
[facility]  
[address] [place] [address]  
Yana Barbara   
   
                                                    Neutrophils/100 WBC   
(Bld)           55.7 %          Normal          43.0-75.0       [facility]  
   
                                        Comment on above:   Performed By: #### C  
BC ####  
[facility]  
[address] [place] [address]  
Yana Barbara   
   
                                                    Platelet mean volume   
(Bld) [Entitic vol] 10.3 fL         Normal          9.5-13.5        [facility]  
   
                                        Comment on above:   Performed By: #### C  
BC ####  
[facility]  
[address] [place], [place] [address]  
Yana Barbara   
   
                                                    Platelets (Bld)   
[#/Vol]         225 103/ul      Normal          150-450         [facility]  
   
                                        Comment on above:   Performed By: #### C  
BC ####  
[facility]  
[address] [place], [place] [address]  
Yana Barbara   
   
                      RBC (Bld) [#/Vol] 4.88 106/ul Normal     4.20-5.40  [facility]  
   
                                        Comment on above:   Performed By: #### C  
BC ####  
[facility]  
[address] [place], [place] [address]  
Yana Barbara   
   
                      WBC (Bld) [#/Vol] 3.8 103/ul Critically low 4.0-11.0   The  
 [facility]  
   
                                        Comment on above:   Performed By: #### C  
BC ####  
[facility]  
[address] [address]  
Yana Jimenez   
   
                                                    CT HEAD WO CONon 2019   
   
                                        CT HEAD WO CON      [address] [place] [address]  
Phone [phone]  
  
Patient: JESSICA QUICK Exam Date: 2019  
: 1943 Gender:F   
MRN: [id number]  
Ordering : FERNANDO KOROMA Admission #:   
[id number]  
Family : DR CLAUDIO WALLACE   
DARYL Order #:   
[id number]  
CLICK HERE TO VIEW EXAM  
  
RADIOLOGY REPORT  
  
PROCEDURE: CT HEAD   
WITHOUT CONTRAST  
  
COMPARISON: None.  
  
INDICATIONS: Ear   
infection and vertigo for   
past two weeks  
  
TECHNIQUE: Axial CT   
images were obtained   
without IV contrast.  
  
DOSE: 941mGycm  
  
FINDINGS:  
BRAIN: No edema,   
hemorrhage, mass, acute   
infarction, or   
inappropriate  
atrophy.  
CSF SPACES: No   
hydrocephalus,   
subarachnoid hemorrhage,   
or mass.  
Appropriate for age.  
SKULL: No fracture, mass,   
or other significant   
visible lesion.  
SINUSES: No significant   
mucosal thickening or   
fluid on the limited   
views.  
  
ORBITS: No appreciable   
abnormality on the   
limited views.  
OTHER: No fluid within   
the middle year or   
mastoid air cells   
bilaterally.  
No soft tissue mass   
within the external   
auditory canal or   
abnormal widening of  
the internal auditory   
canal.  
  
CONCLUSION:  
1. Normal examination. No   
suspicious findings to   
account for the patient's  
symptoms.  
  
  
  
Dictated by: Tacos Perez M.D. on 2019   
at 12:01  
Approved by: Tacos Perez M.D. on 2019   
at 12:07            Normal                                  [facility]  
   
                                                    PROF CHEM 8 (BAS METB)on    
   
                      Anion gap [Moles/Vol] 12.7 mmol/L Normal                [facility]  
   
                                        Comment on above:   Performed By: #### B  
ETTA FISH ####  
[facility]  
[address] [place], [place] [address]  
Yana Barbara   
   
                      Calcium [Mass/Vol] 8.9 mg/dL  Normal     8.4-10.2   [facility]  
   
                                        Comment on above:   Performed By: #### B  
ETTA FISH ####  
[facility]  
[address] [place], [place] [address]  
Yana Jimenez   
   
                      Chloride [Moles/Vol] 108 mmol/L Critically high       
 [facility]  
   
                                        Comment on above:   Performed By: #### B  
ABE, CMADM ####  
[facility]  
[address] [id number]  
Yana Barbara   
   
                      CO2 [Moles/Vol] 26.1 mmol/L Normal     22.0-30.0  [facility]  
   
                                        Comment on above:   Performed By: #### B  
ABE, CMADM ####  
[facility]  
[address] [address]  
Yana Barbara   
   
                      Creatinine [Mass/Vol] 1.07 mg/dL Critically high 0.52-1.04  
  [facility]  
   
                                        Comment on above:   Performed By: #### B  
ABE, CMADM ####  
[facility]  
[address] [id number]  
Yana Barbara   
   
                      EGFR-AF AMERICAN >60        Normal     >=60       [facility]  
   
                                        Comment on above:   Performed By: #### B  
ABE, CMADM ####  
[facility]  
[address] [address]  
Yana Barbara   
   
                      EGFR-NON AF AMERICAN 50 mL/min/1.73m2 Critically low >=60   
      [facility]  
   
                                        Comment on above:   Performed By: #### B  
ABE, CMADM ####  
[facility]  
[address] [id number]  
Yana Barbara   
   
                      Glucose [Mass/Vol] 92 mg/dL   Normal          [facility]  
   
                                        Comment on above:   Performed By: #### B  
ABE, CMADM ####  
[facility]  
[address] [address]  
Yana Barbara   
   
                      Potassium [Moles/Vol] 3.8 mmol/L Normal     3.4-5.0    [facility]  
   
                                        Comment on above:   Performed By: #### B  
ABE, CMADM ####  
[facility]  
[address] [id number]  
Yana Barbara   
   
                      Sodium [Moles/Vol] 143 mmol/L Normal     137-145    [facility]  
   
                                        Comment on above:   Performed By: #### B  
ABE, CMADM ####  
[facility]  
[address] [id number]  
Yana Barbara   
   
                                                    Urea nitrogen   
[Mass/Vol]      14.0 mg/dL      Normal          7.0-17.0        [facility]  
   
                                        Comment on above:   Performed By: #### B  
ETTA FISH ####  
[facility]  
[address] [place], [place] [address]  
Yana Jimenez   
   
                                                    Urea   
nitrogen/Creatinine   
[Mass ratio]    13.1 mg/mg      Normal                          [facility]  
   
                                        Comment on above:   Performed By: #### B  
ETTA FISH ####  
[facility]  
[address] [place], [place] [address]  
Yana Jimenez   
   
                                                    ALT (SGPT)on 2019   
   
                      ALT enzyme act/vol 18 U/L     Normal     7-45       [facility]  
   
                                        Comment on above:   Performed By: #### 1  
[id number] ####  
[facility]  
[address] [place] [address]   
   
                                                    AST (SGOT)on 2019   
   
                      AST enzyme act/vol 23 U/L     Normal     13-39      [facility]  
   
                                        Comment on above:   Performed By: #### 1  
[id number] ####  
[facility]  
[address] [place] [address]   
   
                                                    Creatinineon 2019   
   
                      Creatinine mass conc 1.03 mg/dL Normal     0.50-1.05  [facility]  
   
                                        Comment on above:   Performed By: #### 1  
[id number] ####  
[facility]  
[address] [place] [address]   
   
                                                    GFR/1.73 sq   
M.predicted MDRD vol   
rate/area       52 mL/min/{1.73_m2} Normal                          [facility]  
   
                                        Comment on above:   Result Comment: Inte  
rpretation for Chronic Kidney Disease:  
Stages 1&2 >60 Healthy or potential kidney damage.  
Mild decrease of GFR.  
Stage 3 30-59 Moderate decrease of GFR.  
Stage 4 15-29 Severe decrease of GFR.  
Stage 5 <15 Kidney failure or on dialysis.   
   
                                                            Performed By: #### 1  
[id number] ####  
[facility]  
[address] [place] [address]   
   
                                                    Electrolyte Panelon 20  
19   
   
                      Anion gap molar conc 13 mmol/L  Normal     10-20      [facility]  
   
                                        Comment on above:   Performed By: #### 1  
[id number] ####  
[facility]  
[address] [place] [address]   
   
                      Chloride molar conc 104 mmol/L Normal          [facility]   
Healthcare  
   
                                        Comment on above:   Performed By: #### 1  
[id number] ####  
[facility]  
[address] [place] [address]   
   
                      HCO3 molar conc (Bld) 27 mmol/L  Normal     21-32      [facility]  
   
Healthcare  
   
                                        Comment on above:   Performed By: #### 1  
[id number] ####  
[facility]  
[address] [place] [address]   
   
                      Potassium molar conc 3.8 mmol/L Normal     3.5-5.1    [facility]   
Healthcare  
   
                                        Comment on above:   Performed By: #### 1  
[id number] ####  
[facility]  
[address] [place] [address]   
   
                      Sodium molar conc 140 mmol/L Normal     136-145    [facility]   
Healthcare  
   
                                        Comment on above:   Performed By: #### 1  
[id number] ####  
[facility]  
[address] [place] [address]   
   
                                                    Lipid Panelon 2019   
   
                                                    Cholesterol in HDL   
mass conc       50 mg/dL        Normal                          [facility]   
Healthcare  
   
                                        Comment on above:   Result Comment: Age   
Normal Mod Risk High Risk  
5-9 >46 38-46 <38  
10-14 >44 40-44 <40  
15-19 >42 38-42 <38  
Adult >49   
   
                                                            Performed By: #### 1  
[id number] ####  
[facility]  
[address] [place] [address]   
   
                                                    Cholesterol in LDL   
mass conc       60 mg/dL        Normal          <130            [facility]  
   
                                        Comment on above:   Performed By: #### 1  
[id number] ####  
[facility]  
[address] [place] [address]   
   
                                                    Cholesterol in VLDL   
mass conc       15 mg/dL        Normal          <30             [facility]   
Healthcare  
   
                                        Comment on above:   Performed By: #### 1  
[id number] ####  
[facility]  
[address] [place] [address]   
   
                      Cholesterol mass conc 125 mg/dL  Normal     <200       [facility]  
   
Healthcare  
   
                                        Comment on above:   Performed By: #### 1  
[id number] ####  
[facility]  
[address] [place] [address]   
   
                                                    Cholesterol.total/Chol  
esterol in HDL mass   
ratio           2.5 {ratio}     Normal                          [facility]   
Healthcare  
   
                                        Comment on above:   Performed By: #### 1  
[id number] ####  
[facility]  
[address] [place] [address]   
   
                      Triglyceride mass conc 77 mg/dL   Normal     <150       [facility]  
   
Healthcare  
   
                                        Comment on above:   Result Comment: 150-  
199 Borderline High  
200-499 High  
>500 Very High   
   
                                                            Performed By: #### 1  
[id number] ####  
[facility]  
[address] [place] [address]   
   
                                                    Urea Nitrogenon 2019   
   
                                                    Urea nitrogen mass   
conc            17 mg/dL        Normal          6-23            [facility]   
Healthcare  
   
                                        Comment on above:   Performed By: #### 1  
[id number] ####  
[facility]  
[address] [place] [address]   
   
                                                    Creatinineon 2018   
   
                      Creatinine mass conc 0.88 mg/dL Normal     0.50-1.05  [facility]   
Healthcare  
   
                                        Comment on above:   Performed By: #### 1  
[id number] ####  
[facility]  
[address] [place] [address]   
   
                                                    GFR/1.73 sq   
M.predicted MDRD vol   
rate/area       mL/min/{1.73_m2} Normal                          [facility]   
Healthcare  
   
                                        Comment on above:   Result Comment: Inte  
rpretation for Chronic Kidney Disease:  
Stages 1&2 >60 Healthy or potential kidney damage.  
Mild decrease of GFR.  
Stage 3 30-59 Moderate decrease of GFR.  
Stage 4 15-29 Severe decrease of GFR.  
Stage 5 <15 Kidney failure or on dialysis.   
   
                                                            Performed By: #### 1  
[id number] ####  
[facility]  
[address] [place] [address]   
   
                                                    Electrolyte Panelon 20  
18   
   
                      Anion gap molar conc 12 mmol/L  Normal     10-20      [facility]   
Healthcare  
   
                                        Comment on above:   Performed By: #### 1  
[id number] ####  
[facility]  
[address] [place] [address]   
   
                      Chloride molar conc 104 mmol/L Normal          [facility]   
Healthcare  
   
                                        Comment on above:   Performed By: #### 1  
[id number] ####  
[facility]  
[address] [place] [address]   
   
                      HCO3 molar conc (Bld) 28 mmol/L  Normal     21-32      [facility]  
   
Healthcare  
   
                                        Comment on above:   Performed By: #### 1  
[id number] ####  
[facility]  
[address] [place] [address]   
   
                      Potassium molar conc 3.8 mmol/L Normal     3.5-5.1    [facility]   
Healthcare  
   
                                        Comment on above:   Performed By: #### 1  
[id number] ####  
[facility]  
[address] [place] [address]   
   
                      Sodium molar conc 140 mmol/L Normal     136-145    [facility]   
Healthcare  
   
                                        Comment on above:   Performed By: #### 1  
[id number] ####  
[facility]  
[address] [place] [address]   
   
                                                    Urea Nitrogenon 2018   
   
                                                    Urea nitrogen mass   
conc            13 mg/dL        Normal          6-23            [facility]   
Healthcare  
   
                                        Comment on above:   Performed By: #### 1  
[id number] ####  
[facility]  
[address] [place] [address]   
  
  
  
Vital Signs  
  
  
                          Date Time    Vital Sign   Value        Performing   
Clinician                               Facility  
   
                                                    01-   
08:          Body temperature    97.9 [degF]         APRN Harleen   
[facility]  
Work Phone:   
[phone]                          [facility]  
   
                                                    01-   
08:                              Diastolic blood   
pressure                  70 mm[Hg]                 APRDORA Harleen   
[facility]  
Work Phone:   
[phone]                          [facility]  
   
                                                    01-   
08:          Heart rate          66 /min             APRDORA Harleen   
[facility]  
Work Phone:   
[phone]                          [facility]  
   
                                                    01-   
08:          Respiratory rate    16 /min             APRDORA Harleen   
[facility]  
Work Phone:   
[phone]                          [facility]  
   
                                                    01-   
08:                              SaO2% (BldA) [Mass   
fraction]                 99 %                      APRDORA Harleen   
[facility]  
Work Phone:   
[phone]                          [facility]  
   
                                                    01-   
08:                              Systolic blood   
pressure                  166 mm[Hg]                APRN Harleen   
[facility]  
Work Phone:   
[phone]                          [facility]  
   
                                                    01-   
06:          Body weight         56.8 kg             APRDORA Harleen   
[facility]  
Work Phone:   
[phone]                          [facility]  
   
                                                    2024   
15:          Body height         165.1 cm            STEVE Singh  
Work Phone:   
[phone]                          [facility]  
   
                                                    10-   
11:                              Diastolic blood   
pressure                  100 mm[Hg]                Tayo Norris DO  
Work Phone:   
[phone]                          [facility]  
   
                                                    10-   
11:                              Systolic blood   
pressure                  200 mm[Hg]                Tayo Norris DO  
Work Phone:   
[phone]                          [facility]  
   
                                                    10-   
10:          Body height         165.1 cm            Tayo Norris DO  
Work Phone:   
[phone]                          [facility]  
   
                                                    10-   
10:                              Body mass index   
(BMI) [Ratio]             20.47 kg/m2               Tayo Norris DO  
Work Phone:   
[phone]                          [facility]  
   
                                                    10-   
10:          Body weight         55.79 kg            Tayo Norris DO  
Work Phone:   
[phone]                          [facility]  
   
                                                    10-   
10:          Heart rate          66 /min             Tayo Norris DO  
Work Phone:   
[phone]                          [facility]  
   
                                                    2023   
18:          Heart rate          68 /min             DO Claudio House  
Work Phone:   
[phone]                          [facility]  
   
                                                    2023   
18:          Body height         165.1 cm            DO Claudio House  
Work Phone:   
[phone]                          [facility]  
   
                                                    2023   
18:          Body temperature    97.4 [degF]         DO Claudio House  
Work Phone:   
[phone]                          [facility]  
   
                                                    2023   
18:          Body weight         55.6 kg             DO Claudio House  
Work Phone:   
[phone]                          [facility]  
   
                                                    2023   
18:                              Diastolic blood   
pressure                  106 mm[Hg]                DO Claudio House  
Work Phone:   
[phone]                          [facility]  
   
                                                    2023   
18:          Respiratory rate    14 /min             DO Claudio House  
Work Phone:   
[phone]                          [facility]  
   
                                                    2023   
18:                              SaO2% (BldA) [Mass   
fraction]                 99 %                      DO Claudio House  
Work Phone:   
[phone]                          [facility]  
   
                                                    2023   
18:                              Systolic blood   
pressure                  184 mm[Hg]                DO Claudio House  
Work Phone:   
[phone]                          [facility]  
   
                                                    2022   
13:          Body height         162.56 cm           Andrew Lydia  
Other Phone:   
[phone]                           [facility]   
Professional   
[employer]poration  
Other Phone:   
[phone]  
   
                                                    2022   
13:                              Body mass index   
(BMI) [Ratio]             21.45 kg/m2               Andrew Lydia  
Other Phone:   
[phone]                           [facility]   
[employer]   
[employer]  
Other Phone:   
[phone]  
   
                                                    2022   
13:          Body weight         56.7 kg             Andrew Lydia  
Other Phone:   
[phone]                           [facility]   
[employer]   
[employer]  
Other Phone:   
[phone]  
   
                                                    2022   
11:                              Diastolic blood   
pressure                  80 mm[Hg]                 Claudio P House  
Work Phone:   
[phone]                          [facility]   
[employer] [address] DO  
Work Phone:   
[phone]  
   
                                                    2022   
11:                              Systolic blood   
pressure                  140 mm[Hg]                Claudio P House  
Work Phone:   
[phone]                          [facility]   
[facility] [address] DO  
Work Phone:   
[phone]  
   
                                                    2022   
10:          Body height         162.56 cm           Claudio P House  
Work Phone:   
[phone]                          [facility]   
[facility] [address] DO  
Work Phone:   
[phone]  
   
                                                    2022   
10:                              Body mass index   
(BMI) [Ratio]             21.28 kg/m2               Claudio P House  
Work Phone:   
[phone]                          [facility]   
[facility] [address] DO  
Work Phone:   
[phone]  
   
                                                    2022   
10:                              Body surface area   
Derived from   
formula                   1.6 m2                    Claudio P House  
Work Phone:   
[phone]                          [facility]   
[facility] [address] DO  
Work Phone:   
[phone]  
   
                                                    2022   
10:          Body weight         56.25 kg            Claudio P House  
Work Phone:   
[phone]                          [facility]   
[facility] [address] DO  
Work Phone:   
[phone]  
   
                                                    2022   
10:                              Diastolic blood   
pressure                  96 mm[Hg]                 Claudio P House  
Work Phone:   
[phone]                          [facility]   
[facility] [address] DO  
Work Phone:   
[phone]  
   
                                                    2022   
10:          Heart rate          75 /min             Claudio P House  
Work Phone:   
[phone]                          [facility]   
[facility] [address] DO  
Work Phone:   
[phone]  
   
                                                    2022   
10:                              Systolic blood   
pressure                  152 mm[Hg]                Claudio P House  
Work Phone:   
[phone]                          [facility]   
[facility] [address] DO  
Work Phone:   
[phone]  
   
                                                    10-   
11:          Body height         162.56 cm           Claudio P House  
Work Phone:   
[phone]                          [facility]   
[facility] [address]   
DO  
Work Phone:   
[phone]  
   
                                                    10-   
11:                              Body mass index   
(BMI) [Ratio]             21.46 kg/m2               Claudio P House  
Work Phone:   
[phone]                          [facility]   
[facility] [address]   
DO  
Work Phone:   
[phone]  
   
                                                    10-   
11:                              Body surface area   
Derived from   
formula                   1.6 m2                    Claudio P House  
Work Phone:   
[phone]                          [facility]   
[facility] [address]   
DO  
Work Phone:   
[phone]  
   
                                                    10-   
11:          Body weight         56.7 kg             Claudio P House  
Work Phone:   
[phone]                          [facility]   
[facility] [address]   
DO  
Work Phone:   
[phone]  
   
                                                    10-   
11:                              Diastolic blood   
pressure                  104 mm[Hg]                Claudio P House  
Work Phone:   
[phone]                          [facility]   
[facility] [address]   
DO  
Work Phone:   
[phone]  
   
                                                    10-   
11:          Heart rate          98 /min             Claudio P House  
Work Phone:   
[phone]                          [facility]   
[facility] [address]   
DO  
Work Phone:   
[phone]  
   
                                                    10-   
11:                              Systolic blood   
pressure                  168 mm[Hg]                Claudio P House  
Work Phone:   
[phone]                          [facility]   
[facility] [address]   
DO  
Work Phone:   
[phone]  
   
                                                    2022   
20:                              Diastolic blood   
pressure                  96 mm[Hg]                 DO Claudio House  
Work Phone:   
[phone]                          [facility]  
   
                                                    2022   
20:          Heart rate          82 /min             DO Claudio House  
Work Phone:   
[phone]                          [facility]  
   
                                                    2022   
20:          Respiratory rate    18 /min             DO Claudio House  
Work Phone:   
[phone]                          [facility]  
   
                                                    2022   
20:                              SaO2% (BldA) [Mass   
fraction]                 100 %                     DO Claudio House  
Work Phone:   
[phone]                          [facility]  
   
                                                    2022   
20:                              Systolic blood   
pressure                  197 mm[Hg]                DO Claudio House  
Work Phone:   
[phone]                          [facility]  
   
                                                    2022   
16:          Body height         162.56 cm           DO Claudio House  
Work Phone:   
[phone]                          [facility]  
   
                                                    2022   
16:          Body temperature    98.6 [degF]         DO Claudio House  
Work Phone:   
[phone]                          [facility]  
   
                                                    2022   
16:          Body weight         54 kg               DO Claudio House  
Work Phone:   
[phone]                          [facility]  
   
                                                    06-   
12:          Body height         162.56 cm           Andrew Freeman  
Other Phone:   
[phone]                           [facility]   
[employer]   
[employer]  
Other Phone:   
[phone]  
   
                                                    06-   
12:                              Body mass index   
(BMI) [Ratio]             18.88 kg/m2               Andrew Lydia  
Other Phone:   
[phone]                           [facility]   
[employer]   
[employer]  
Other Phone:   
[phone]  
   
                                                    06-   
12:          Body weight         49.9 kg             Andrew Lydia  
Other Phone:   
[phone]                           [facility]   
[employer]   
[employer]  
Other Phone:   
[phone]  
   
                                                    2022   
12:          Body height         162.56 cm           Andrew Lydia  
Other Phone:   
[phone]                           [facility]   
[employer]   
[employer]  
Other Phone:   
[phone]  
   
                                                    2022   
12:                              Body mass index   
(BMI) [Ratio]             18.88 kg/m2               Andrew Lydia  
Other Phone:   
[phone]                           [facility]   
[employer]   
[employer]  
Other Phone:   
[phone]  
   
                                                    2022   
12:          Body weight         49.9 kg             Andrew Lydia  
Other Phone:   
[phone]                           [facility]   
[employer]   
[employer]  
Other Phone:   
[phone]  
   
                                                    2022   
11:          Body height         162.56 cm           Andrew Lydia  
Other Phone:   
[phone]                           [facility]   
[employer]   
[employer]  
Other Phone:   
[phone]  
   
                                                    2022   
11:                              Body mass index   
(BMI) [Ratio]             19.39 kg/m2               Andrew Lydia  
Other Phone:   
[phone]                           [facility]   
[employer]   
[employer]  
Other Phone:   
[phone]  
   
                                                    2022   
11:          Body weight         51.26 kg            Andrew Lydia  
Other Phone:   
[phone]                           [facility]   
[employer]   
[employer]  
Other Phone:   
[phone]  
   
                                                    2021   
15:          Body height         162.56 cm           Andrew Lydia  
Other Phone:   
[phone]                           [facility]   
[employer]   
[employer]  
Other Phone:   
[phone]  
   
                                                    2021   
15:                              Body mass index   
(BMI) [Ratio]             20.94 kg/m2               Andrew Lydia  
Other Phone:   
[phone]                           [facility]   
[employer]   
[employer]  
Other Phone:   
[phone]  
   
                                                    2021   
15:          Body weight         55.34 kg            Andrew Freeman  
Other Phone:   
[phone]                           [facility]   
[employer]   
[employer]  
Other Phone:   
[phone]  
   
                                                    2021   
12:          Body height         162.56 cm           Andrew Freeman  
Other Phone:   
[phone]                           [facility]   
[employer]   
[employer]  
Other Phone:   
[phone]  
   
                                                    2021   
12:                              Body mass index   
(BMI) [Ratio]             21.45 kg/m2               Andrew Freeman  
Other Phone:   
[phone]                           [facility]   
[employer]   
[employer]  
Other Phone:   
[phone]  
   
                                                    2021   
12:          Body weight         56.7 kg             Andrew Freemna  
Other Phone:   
[phone]                           [facility]   
[employer]   
[employer]ation  
Other Phone:   
[phone]  
   
                                                    10-   
11:          Body height         165.1 cm            Claudio P House  
Work Phone:   
[phone]                          [facility]   
[facility] [address]   
DO  
Work Phone:   
[phone]  
   
                                                    10-   
11:                              Body mass index   
(BMI) [Ratio]             21.63 kg/m2               Claudio P House  
Work Phone:   
[phone]                          [facility]   
[facility] [place] [address]   
DO  
Work Phone:   
[phone]  
   
                                                    10-   
11:                              Body surface area   
Derived from   
formula                   1.65 m2                   Claudio P House  
Work Phone:   
[phone]                          [facility]   
[facility] [address]   
DO  
Work Phone:   
[phone]  
   
                                                    10-   
11:          Body weight         58.97 kg            Claudio P House  
Work Phone:   
[phone]                          [facility]   
[facility] [address]   
DO  
Work Phone:   
[phone]  
   
                                                    10-   
11:                              Diastolic blood   
pressure                  70 mm[Hg]                 Claudio P House  
Work Phone:   
[phone]                          [facility]   
[facility] [address]   
DO  
Work Phone:   
[phone]  
   
                                                    10-   
11:          Heart rate          56 /min             Claudio P House  
Work Phone:   
[phone]                          [facility]   
[facility] [address]   
DO  
Work Phone:   
[phone]  
   
                                                    10-   
11:                              Systolic blood   
pressure                  122 mm[Hg]                Claudio P House  
Work Phone:   
[phone]                          [facility]   
[facility] [address]   
DO  
Work Phone:   
[phone]  
   
                                                    2021   
00:                              65 1                Claudio P House  
Work Phone:   
[phone]                          [facility]   
[facility] [address]   
DO  
Work Phone:   
[phone]  
   
                                                    Comment on   
above:                                  [id number]   
  
  
  
Encounters  
  
  
                          Encounter Date Encounter Type Care Provider Facility  
   
                                                    Start: 2024  
End: 2024           ambulatory                Andrew Freeman  
Other Phone:   
[phone]                           [facility]   
[employer]   
[employer]  
Other Phone:   
[phone]  
   
                          Start: 2024 Telephone encounter Andrew Sellers   
Orthopedics  
   
                                                    Start: 2024  
End: 2024           ambulatory                Andrew Freeman  
Other Phone:   
[phone]                           [facility]   
[employer]   
[employer]  
Other Phone:   
[phone]  
   
                          Start: 2024 Telephone encounter Andrew Sellers   
Orthopedics  
   
                                                    Start: 01-  
End: 2024     ambulatory          Eric Lozano MD Facility:[facility]   
Clinic  
   
                                                    Start: 2024  
End: 01-     ambulatory          Hermann Roque    Facility:[facility]  
   
                                                    Start: 2024  
End: 01-                         Evaluation and management   
of inpatient                            APRDORA Millse  
Work Phone:   
[phone]                          [facility] [place]   
Med Surg  
Work Phone:   
[phone]  
   
                                                    Start: 2024  
End: 01-           observation encounter     STEVE Singh  
Work Phone:   
[phone]                          [facility]  
Work Phone:   
[phone]  
   
                                                    Start: 2024  
End: 2024           ambulatory                Andrew Freeman  
Other Phone:   
[phone]                           [facility]   
[employer]   
[employer]  
Other Phone:   
[phone]  
   
                          Start: 2024 Telephone encounter Andrew DEL ANGEL [facility]   
Orthopedics  
   
                                                    Start: 2023  
End: 2023     ambulatory          Eric Lozano MD Facility:[facility]   
Clinic  
   
                          Start: 2023 ambulatory   Eric Lozano MD Fac  
ility:[facility]   
Clinic  
   
                                                    Start: 2023  
End: 2023     ambulatory          [facility]  
   
Ambulatory  
   
                                                    Start: 2023  
End: 2023           ambulatory                Andrew Freeman  
Other Phone:   
[phone]                           [facility]   
[employer]   
[employer]  
Other Phone:   
[phone]  
   
                          Start: 2023 Telephone encounter Andrew DEL ANGEL [facility]   
Orthopedics  
   
                                                    Start: 2023  
End: 2023           ambulatory                Andrew Freeman  
Other Phone:   
[phone]                           [facility]   
[employer]   
[employer]  
Other Phone:   
[phone]  
   
                          Start: 2023 Telephone encounter Andrew DEL ANGEL [facility]   
Orthopedics  
   
                                                    Start: 10-  
End: 10-     ambulatory          [facility]  
   
Ambulatory  
   
                                                    Start: 10-  
End: 10-                         Office outpatient visit 25   
minutes                                 [facility]   
DO  
Work Phone:   
[phone]                          [facility]  
   
                                        Comment on above:   Atherosclerosis of n  
ative coronary artery of native heart   
without   
angina pectoris;  
Chronic atrial fibrillation (CMS/HCC);  
Essential hypertension, benign;  
SANDY (dyspnea on exertion);  
History of PTCA;  
High risk medication use;  
Hyperlipidemia, unspecified hyperlipidemia type   
   
                                                    Start: 2023  
End: 2023           ambulatory                Andrew Freeman  
Other Phone:   
[phone]                           [facility]   
[employer]   
[employer]ation  
Other Phone:   
[phone]  
   
                          Start: 2023 Telephone encounter Andrew DEL ANGEL [facility]   
Orthopedics  
   
                                Start: 2023 Rx Renewal      Claudio P Hous  
e  
Work Phone:   
[phone]                          [facility]   
[facility] [address] DO  
Work Phone:   
[phone]  
   
                                Start: 2023 Rx Renewal      Claudio P Hous  
e  
Work Phone:   
[phone]                          [facility]   
[facility] [address] DO  
Work Phone:   
[phone]  
   
                                                    Start: 2023  
End: 2023           ambulatory                Micki Pereiraalverto  
Other Phone:   
[phone]                           [facility]   
Professional   
[employer]ation  
Other Phone:   
[phone]  
   
                                        Start: 2023   Office outpatient vi  
sit 10   
minutes                   Micki Amado            [facility]   
Orthopedics  
   
                                                    Start: 2023  
End: 2023           ambulatory                Andrew Freeman  
Other Phone:   
[phone]                           [facility]   
[employer]   
[employer]ation  
Other Phone:   
[phone]  
   
                          Start: 2023 Telephone encounter Andrew FRANKLIN  
[facility]   
Orthopedics  
   
                                                    Start: 2023  
End: 2023                         Emergency department   
patient visit             Morteza LINSEY Nguyễn            Facility:[facility]  
   
                                                    Start: 2023  
End: 2023                         Emergency department   
patient visit                           DO Claudio House  
Work Phone:   
[phone]                          [facility]   
Room  
Work Phone:   
[phone]  
   
                                                    Start: 2023  
End: 2023           ambulatory                Andrew Freeman  
Other Phone:   
[phone]                           [facility]   
[employer]   
[employer]  
Other Phone:   
[phone]  
   
                          Start: 2023 Telephone encounter Andrew FRANKLIN  
[facility]   
Orthopedics  
   
                                Start: 2023 Rx Renewal      Claudio P Hous  
e  
Work Phone:   
[phone]                          [facility]   
[facility] [address] DO  
Work Phone:   
[phone]  
   
                                                    Start: 2023  
End: 2023           ambulatory                Andrew Freeman  
Other Phone:   
[phone]                           [facility]   
Professional   
[employer]poration  
Other Phone:   
[phone]  
   
                          Start: 2023 Telephone encounter Andrew FRANKLIN  
[facility]   
Orthopedics  
   
                                                    Start: 2023  
End: 2023           ambulatory                Micki Fischer  
Other Phone:   
[phone]                           [facility]   
Professional   
[employer]poration  
Other Phone:   
[phone]  
   
                                        Start: 2023   Office outpatient vi  
sit 15   
minutes                   Micki Pereiraalverto [facility]   
Orthopedics  
   
                                                    Start: 2023  
End: 2023           ambulatory                Andrew Freeman  
Other Phone:   
[phone]                           [facility]   
Professional   
[employer]poration  
Other Phone:   
[phone]  
   
                          Start: 2023 Telephone encounter Andrew Freeman NOEMI  
[facility] [place]   
Orthopedics  
   
                                                    Start: 2023  
End: 2023           ambulatory                Andrew Freeman  
Other Phone:   
[phone]                           [facility]   
[employer]   
[employer]  
Other Phone:   
[phone]  
   
                          Start: 2023 Telephone encounter Andrew Freeman NOEMI  
[facility]  
   
                                                    Start: 2023  
End: 2023           ambulatory                Andrew Freeman  
Other Phone:   
[phone]                           [facility]   
[employer]   
[employer]  
Other Phone:   
[phone]  
   
                          Start: 2023 Telephone encounter Andrew Freeman NOEMI  
[facility]  
   
                                                    Start: 2023  
End: 2023           ambulatory                Andrew Freeman  
Other Phone:   
[phone]                           [facility]   
[employer]   
[employer]  
Other Phone:   
[phone]  
   
                          Start: 2023 Telephone encounter Andrew Freeman NOEMI  
[facility]  
   
                                                    Start: 2023  
End: 2023           ambulatory                Andrew Freeman  
Other Phone:   
[phone]                           [facility]   
[employer]   
[employer]  
Other Phone:   
[phone]  
   
                          Start: 2023 Telephone encounter Andrew Freeman NOEMI  
[facility]  
   
                                                    Start: 2023  
End: 2023           ambulatory                Andrew Freeman  
Other Phone:   
[phone]                           [facility]   
[employer]   
[employer]  
Other Phone:   
[phone]  
   
                          Start: 2023 Telephone encounter Andrew Freeman NOEMI  
[facility]  
   
                                                    Start: 2023  
End: 2023           ambulatory                Andrew Freeman  
Other Phone:   
[phone]                           [facility]   
[employer]   
[employer]  
Other Phone:   
[phone]  
   
                          Start: 2023 Telephone encounter Andrew Freeman NOEMI  
[facility]  
   
                                                    Start: 02-  
End: 02-           ambulatory                Andrew Freeman  
Other Phone:   
[phone]                           [facility]   
[employer]   
[employer]  
Other Phone:   
[phone]  
   
                          Start: 02- Telephone encounter Andrew FRANKLIN  
[facility]  
   
                                                    Start: 2023  
End: 2023           ambulatory                Andrew Freeman  
Other Phone:   
[phone]                           [facility]   
[employer]   
[employer]  
Other Phone:   
[phone]  
   
                          Start: 2023 Telephone encounter Andrew FRANKLIN  
[facility]  
   
                                                    Start: 2023  
End: 2023           ambulatory                Andrew Freeman  
Other Phone:   
[phone]                           [facility]   
[employer]   
[employer]  
Other Phone:   
[phone]  
   
                          Start: 2023 Telephone encounter Andrew FRANKLIN  
[facility]  
   
                                                    Start: 2022  
End: 2022           ambulatory                Andrew Freeman  
Other Phone:   
[phone]                           [facility]   
[employer]   
[employer]  
Other Phone:   
[phone]  
   
                          Start: 2022 Telephone encounter Andrew FRANKLIN  
[facility]  
   
                                        Start: 2022   Office outpatient vi  
sit 15   
minutes                   Andrew Freeman             [facility]  
   
                                                    Start: 2022  
End: 2022           ambulatory                DO Claudio House  
Work Phone:   
[phone]                          [facility]  
Work Phone:   
[phone]  
   
                                                    Start: 2022  
End: 2022                         Patient encounter   
procedure                               DO Claudio House  
Work Phone:   
[phone]                          [facility]   
[facility]  
   
                                                    Start: 2022  
End: 2022           ambulatory                Andrwe Freeman  
Other Phone:   
[phone]                           [facility]   
[employer]   
[employer]  
Other Phone:   
[phone]  
   
                          Start: 2022 Telephone encounter Andrew FRANKLIN  
[facility]  
   
                                                    Start: 2022  
End: 2022           ambulatory                Andrew Freeman  
Other Phone:   
[phone]                           [facility]   
[employer]   
[employer]  
Other Phone:   
[phone]  
   
                          Start: 2022 Telephone encounter Andrew FRANKLIN  
[facility]  
   
                                        Start: 2022   Office outpatient vi  
sit 25   
minutes                                 [facility] P [facility]  
Work Phone:   
[phone]                          [facility]   
[facility] [address] DO  
Work Phone:   
[phone]  
   
                                Start: 2022 ambulatory      Dr. Claudio Brito                                   Facility:  
   
                                                    Start: 11-  
End: 11-           ambulatory                Andrew Freeman  
Other Phone:   
[phone]                           [facility]   
[employer]   
[employer]  
Other Phone:   
[phone]  
   
                          Start: 11- Telephone encounter Andrew Freeman NOEMI  
[facility]  
   
                                                    Start: 2022  
End: 2022           ambulatory                Andrew Freeman  
Other Phone:   
[phone]                           [facility]   
[employer]   
[employer]  
Other Phone:   
[phone]  
   
                          Start: 2022 Telephone encounter Andrew Freeman NOEMI  
[facility]  
   
                                                    Start: 2022  
End: 2022           ambulatory                Andrew Freeman  
Other Phone:   
[phone]                           [facility]   
[employer]   
[employer]  
Other Phone:   
[phone]  
   
                          Start: 2022 Telephone encounter Andrew Freeman NOEMI  
[facility]  
   
                                                    Start: 10-  
End: 10-           ambulatory                Andrew Freeman  
Other Phone:   
[phone]                           [facility]   
[employer]   
[employer]  
Other Phone:   
[phone]  
   
                          Start: 10- Telephone encounter Andrew Freeman NOEMI  
[facility]  
   
                                                    Start: 10-  
End: 10-           ambulatory                Andrew Freeman  
Other Phone:   
[phone]                           [facility]   
[employer]   
[employer]  
Other Phone:   
[phone]  
   
                          Start: 10- Telephone encounter Andrew Freeman NOEMI  
[facility]  
   
                                        Start: 10-   Office outpatient vi  
sit 40   
minutes                                 [facility]  
Work Phone:   
[phone]                          [facility]   
[facility] [address] DO  
Work Phone:   
[phone]  
   
                                Start: 10- ambulatory      Dr. Tayo Norris                                 Facility:  
   
                                                    Start: 2022  
End: 2022           ambulatory                Andrew Freeman  
Other Phone:   
[phone]                           [facility]   
[employer]   
[employer]  
Other Phone:   
[phone]  
   
                          Start: 2022 Telephone encounter Andrew FRANKLIN  
[facility]  
   
                                                    Start: 09-  
End: 09-           ambulatory                Andrew Freeman  
Other Phone:   
[phone]                           [facility]   
[employer]   
[employer]  
Other Phone:   
[phone]  
   
                          Start: 09- Telephone encounter Andrew FRANKLIN  
[facility]  
   
                                                    Start: 2022  
End: 2022           ambulatory                Andrew Freeman  
Other Phone:   
[phone]                           [facility]   
[employer]   
[employer]  
Other Phone:   
[phone]  
   
                          Start: 2022 Telephone encounter Andrew FRANKLIN  
[facility]  
   
                                                    Start: 08-  
End: 08-           ambulatory                Andrew Freeman  
Other Phone:   
[phone]                           [facility]   
[employer]   
[employer]  
Other Phone:   
[phone]  
   
                          Start: 08- Telephone encounter Andrew FRANKLIN  
[facility]  
   
                                                    Start: 08-  
End: 08-           ambulatory                Andrew Freeman  
Other Phone:   
[phone]                           [facility]   
[employer]   
[employer]  
Other Phone:   
[phone]  
   
                          Start: 08- Telephone encounter Andrew FRANKLIN  
[facility]  
   
                                Start: 2022 Rx Renewal      Claudio P Hous  
e  
Work Phone:   
[phone]                          [facility]   
[facility] [address] DO  
Work Phone:   
[phone]  
   
                                                    Start: 2022  
End: 2022           ambulatory                Andrew Freeman  
Other Phone:   
[phone]                           [place]   
[employer]   
[employer]  
Other Phone:   
[phone]  
   
                          Start: 2022 Telephone encounter Andrew FRANKLIN  
[facility]  
   
                                                    Start: 2022  
End: 2022                         Emergency department   
patient visit                           DO Claudio [facility]  
Work Phone:   
[phone]                          [facility]   
Room  
   
                                                    Start: 2022  
End: 2022           ambulatory                Andrew Freeman  
Other Phone:   
[phone]                           [place]   
[employer]   
[employer]  
Other Phone:   
[phone]  
   
                          Start: 2022 Telephone encounter Andrew FRANKLIN  
[facility]  
   
                                                    Start: 2022  
End: 2022           ambulatory                Andrew Freeman  
Other Phone:   
[phone]                           [facility]   
[employer]   
[employer]  
Other Phone:   
[phone]  
   
                          Start: 2022 Telephone encounter Andrew FRANKLIN  
[facility]  
   
                                                    Start: 2022  
End: 2022           Discharged Recurring      DO Claudio House  
Work Phone:   
[phone]                          [facility]   
[facility] [place]  
   
                                                    Start: 2022  
End: 2022           ambulatory                Micki Fischer  
Other Phone:   
[phone]                           [facility]   
[employer]   
[employer]  
Other Phone:   
[phone]  
   
                                        Start: 2022   Office outpatient vi  
sit 15   
minutes                   Micki Fischer            [facility]  
   
                                                    Start: 2022  
End: 2022           ambulatory                Andrew Freeman  
Other Phone:   
[phone]                           [facility]   
[employer]   
[employer]  
Other Phone:   
[phone]  
   
                          Start: 2022 Telephone encounter Andrew FRANKLIN  
[facility]  
   
                                                    Start: 2022  
End: 2022           ambulatory                Rona Irizarry  
Other Phone:   
[phone]                           [facility]   
[employer]   
[employer]  
Other Phone:   
[phone]  
   
                          Start: 2022 Telephone encounter Rona Irizarry  
 [facility]  
   
                                                    Start: 2022  
End: 2022           ambulatory                Rona Irizarry  
Other Phone:   
[phone]                           [facility]   
[employer]   
[employer]  
Other Phone:   
[phone]  
   
                          Start: 2022 Telephone encounter Rona Irizarry  
 [facility]  
   
                                                    Start: 2022  
End: 2022           ambulatory                Andrew Freeman  
Other Phone:   
[phone]                           [facility]   
[employer]   
[employer]  
Other Phone:   
[phone]  
   
                          Start: 2022 Telephone encounter Andrew FRANKLIN  
[facility]  
   
                                Start: 2022 Rx Renewal      Claudio tam  
Work Phone:   
[phone]                          [facility]   
[facility] [address] DO  
Work Phone:   
[phone]  
   
                                                    Start: 06-  
End: 06-           ambulatory                Andrew Freeman  
Other Phone:   
[phone]                           [facility]   
[employer]   
[employer]  
Other Phone:   
[phone]  
   
                                        Start: 06-   Office outpatient vi  
sit 15   
minutes                   Andrew Freeman             [facility]  
   
                                                    Start: 06-  
End: 06-                         Patient encounter   
procedure                               DO Claudio House  
Work Phone:   
[phone]                          [facility]   
[facility]  
   
                                                    Start: 2022  
End: 2022           ambulatory                Andrew Freeman  
Other Phone:   
[phone]                           [facility]   
[employer]   
[employer]  
Other Phone:   
[phone]  
   
                          Start: 2022 Telephone encounter Andrew FRANKLIN  
[facility]  
   
                                                    Start: 2022  
End: 2022           ambulatory                Andrew Freeman  
Other Phone:   
[phone]                           [facility]   
[employer]   
[employer]  
Other Phone:   
[phone]  
   
                          Start: 2022 Telephone encounter Andrew FRANKLIN  
[facility]  
   
                                                    Start: 2022  
End: 2022           ambulatory                Andrew Freeman  
Other Phone:   
[phone]                           [facility]   
[employer]   
[employer]  
Other Phone:   
[phone]  
   
                          Start: 2022 Telephone encounter Andrew FRANKLIN  
[facility]  
   
                                Start: 2022 Rx Renewal      Claudio P Hous  
e  
Work Phone:   
[phone]                          [facility]   
[facility] DO  
Work Phone:   
[phone]  
   
                                                    Start: 2022  
End: 2022           ambulatory                Andrew Freeman  
Other Phone:   
[phone]                           [facility]   
[employer]   
[employer]  
Other Phone:   
[phone]  
   
                          Start: 2022 Telephone encounter Andrew FRANKLIN  
[facility]  
   
                                                    Start: 2022  
End: 2022           ambulatory                Andrew Freeman  
Other Phone:   
[phone]                           [facility]   
[employer]   
[employer]  
Other Phone:   
[phone]  
   
                          Start: 2022 Telephone encounter Andrew Freeman NOEMI  
[facility]  
   
                                                    Start: 2022  
End: 2022           ambulatory                Andrew Freeman  
Other Phone:   
[phone]                           [facility]   
[employer]   
[employer]  
Other Phone:   
[phone]  
   
                          Start: 2022 Telephone encounter Andrew Lydia NOEMI  
[facility]  
   
                                                    Start: 2022  
End: 2022           ambulatory                Andrew Freeman  
Other Phone:   
[phone]                           [facility]   
[employer]   
[employer]  
Other Phone:   
[phone]  
   
                          Start: 2022 Telephone encounter Andrew Freeman NOEMI  
[facility]  
   
                                                    Start: 2022  
End: 2022           ambulatory                Andrew Freeman  
Other Phone:   
[phone]                           [facility]   
[employer]   
[employer]  
Other Phone:   
[phone]  
   
                                        Start: 2022   Office outpatient ne  
w 30   
minutes                   Andrew Freeman             [facility]  
   
                                                    Start: 2022  
End: 2022           ambulatory                Andrew Freeman  
Other Phone:   
[phone]                           [facility]   
[employer]   
[employer]  
Other Phone:   
[phone]  
   
                          Start: 2022 Telephone encounter Andrew Freeman NOEMI  
[facility]  
   
                                                    Start: 2022  
End: 2022           ambulatory                Rona Irizarry  
Other Phone:   
[phone]                           [facility]   
[employer]  
Other Phone:   
[phone]  
   
                          Start: 2022 Telephone encounter Rona Irizarry  
 [facility]  
   
                                                    Start: 2022  
End: 2022           ambulatory                Andrew Freeman  
Other Phone:   
[phone]                           [facility]   
[employer]   
[employer]  
Other Phone:   
[phone]  
   
                          Start: 2022 Telephone encounter Andrew Freeman NOEMI  
[facility]  
   
                                                    Start: 2022  
End: 2022           ambulatory                Andrew Freeman  
Other Phone:   
[phone]                           [facility]   
[employer]   
[employer]  
Other Phone:   
[phone]  
   
                                        Start: 2022   Encounter for other   
preprocedural examination Andrew Freeman             [facility]  
   
                                        Start: 2022   Postop follow up vis  
it   
related to original px    Andrew Freeman             [facility]  
   
                                                    Start: 2022  
End: 2022           ambulatory                Andrew Freeman  
Other Phone:   
[phone]                           [facility]   
[employer]  
Other Phone:   
[phone]  
   
                          Start: 2022 Telephone encounter Andrew Freeman NOEMI  
[facility]  
   
                                                    Start: 2022  
End: 2022           ambulatory                Andrew Freeman  
Other Phone:   
[phone]                           [facility]   
[employer]  
Other Phone:   
[phone]  
   
                          Start: 2022 Telephone encounter Andrew Freeman FP  
[facility]  
   
                                                    Start: 2022  
End: 2022           ambulatory                Andrew Freeman  
Other Phone:   
[phone]                           [facility]   
[employer]   
[employer]  
Other Phone:   
[phone]  
   
                          Start: 2022 Telephone encounter Andrew Freeman FP  
[facility]  
   
                                                    Start: 2021  
End: 2021           ambulatory                Andrew Freeman  
Other Phone:   
[phone]                           [facility]   
[employer]  
Other Phone:   
[phone]  
   
                          Start: 2021 Telephone encounter Andrew Freeman NOEMI  
[facility]  
   
                                                    Start: 2021  
End: 2021           ambulatory                Andrew Freeman  
Other Phone:   
[phone]                           [facility]   
[employer]  
Other Phone:   
[phone]  
   
                                        Start: 2021   Postop follow up vis  
it   
related to original px    Andrew Freeman             [facility]  
   
                                                    Start: 2021  
End: 2021           ambulatory                Andrew Freeman  
Other Phone:   
[phone]                           [facility]   
[employer]  
Other Phone:   
[phone]  
   
                                        Start: 2021   Office outpatient vi  
sit 25   
minutes                   Andrew Freeman             [facility]  
   
                                                    Start: 11-  
End: 11-           ambulatory                Andrew Freeman  
Other Phone:   
[phone]                           [facility]   
[employer]  
Other Phone:   
[phone]  
   
                          Start: 11- Telephone encounter Andrew Freeman FP  
[facility]  
   
                                                    Start: 2021  
End: 2021           ambulatory                Andrew Freeman  
Other Phone:   
[phone]                           [facility]   
[facility]   
[facility]  
Other Phone:   
[phone]  
   
                                        Start: 2021   Office outpatient ne  
w 45   
minutes                   Andrew Freeman             [facility]  
   
                                        Start: 10-   Office outpatient vi  
sit 15   
minutes                                 [facility]  
Work Phone:   
[phone]                          [facility]   
[facility] DO  
Work Phone:   
[phone]  
   
                                                    Start: 2019  
End: 2019                         Patient encounter   
procedure                 CLAUDIO [place]             Facility:[facility]  
   
                                                    Start: 10-  
End: 10-                         Patient encounter   
procedure                 CLAUDIO [place]             Facility[facility]  
   
                                                    Start: 2019  
End: 2019                         Patient encounter   
procedure                 CLAUDIO [place]             Facility[facility]  
   
                                                    Start: 2019  
End: 2019                         Patient encounter   
procedure                 FERNANDO KOROMA           Facility:[facility]  
   
                                        Start: 2019   Patient encounter   
procedure                 PROVIDER UNKNOWN          Facility:1532  
   
                                        Start: 2018   Patient encounter   
procedure                 PROVIDER UNKNOWN          Facility:1532  
   
                                        Start: 2018   Patient encounter   
procedure                 PROVIDER UNKNOWN          Facility:1532  
   
                                        Start: 2018   Patient encounter   
procedure                 PROVIDER UNKNOWN          Facility:1532  
  
  
  
Procedures  
  
  
                          Date         Procedure    Procedure Detail Performing   
Clinician  
   
                                        Start: 2024   CT of head without   
contrast                                            APRDORA Singh  
Work Phone:   
[phone]  
   
                          Start: 2024 Plain chest X-ray              STEVE Singh  
Work Phone:   
[phone]  
   
                          Start: 2023 FOLLOW UP IN CARDIOLOGY               
 TAYO NORRIS  
   
                                        Start: 2023   History of percutane  
ous   
transluminal coronary   
angioplasty               History of PTCA           Tayo Norris DO  
Work Phone:   
[phone]  
   
                          Start: 2022 X-ray of left knee              DO C  
[facility]  
Work Phone:   
[phone]  
   
                          Start: 2022 Plain chest X-ray              DO Ch  
[facility]  
Work Phone:   
[phone]  
   
                          Start: 06- X-ray of left knee              DO C  
[facility]  
Work Phone:   
[phone]  
   
                                        Start: 2020   Lipid 1996 panel - S  
judson   
or Plasma                                           Tayo Norris DO  
Work Phone:   
[phone]  
   
                                                    Start: 2019  
End: 2019                         Microscopic examination of   
blood, culture                                      FERNANDO KOROMA  
   
                                        Comment on above:   Performed By: #### B  
LDCX2 ####  
[facility]  
[address], [place] [address]  
Yana Jimenez   
   
                                                            Performed By: #### B  
LDCX1 ####  
[facility]  
[address], [place] [address]  
Yana Jimenez   
   
                          Start: 2016 Total colonoscopy              Charl  
es P Musations  
Work Phone:   
[phone]  
   
                                       Arthroplasty of knee              Claudio  
 P House  
Work Phone:   
[phone]  
   
                                                            History of percutane  
ous   
transluminal coronary   
angioplasty               History of PTCA           Claudio P House  
Work Phone:   
[phone]  
   
                                                            History of percutane  
ous   
transluminal coronary   
angioplasty               History of PTCA           Tayo Norris DO  
Work Phone:   
[phone]  
   
                                                            Percutaneous translu  
scott   
coronary angioplasty                                Claudio P House  
Work Phone:   
[phone]  
   
                                       SARS Antigen (LFIA)              DO ForMunel  
es Musations  
Work Phone:   
[phone]  
   
                                                            Surgical procedure o  
n eye   
proper                                              Claudio P House  
Work Phone:   
[phone]  
   
                                        Comment on above:   lower pressure;   
  
  
  
Plan of Treatment  
  
  
                          Date         Care Activity Detail       Author  
   
                                                    Start:   
2025          Lipid panel         Lipid Panel         [facility]  
   
                                                    Start:   
01-                                                  [facility]  
   
                                                    Start:   
2024          Hospital admission                      [facility]  
   
                                                    Start:   
2024                                                  [facility]  
   
                                                    Start:   
2023  
End: 2023           Clinical Support          2023 9:30 AM EST   
Clinical Support [facility] [address]   
[address] [phone]                 [facility]  
   
                                                    Start:   
10-                              FUV, Provider:   
Taoy Norris, Status:   
Pen, Time: 10:10 AM                     FUV, Provider:   
Tayo Norris, Status:   
Pen, Time: 10:10 AM                     [facility]   
[facility] DO  
Work Phone:   
[phone]  
   
                                                    Start:   
2023          Influenza vaccination Influenza Vaccine (#1) [facility]  
   
                                                    Start:   
2022                              FUV, Provider: Evette Weston, Status:   
Pen, Time: 11:00 AM                     FUV, Provider: Evette Weston, Status:   
Pen, Time: 11:00 AM                     [facility] 250 DO  
Work Phone:   
[phone]  
   
                                                    Start:   
10-                              FUV, Provider:   
Tayo Norris, Status:   
Pen, Time: 10:40 AM                     FUV, Provider:   
Tayo Norris, Status:   
Pen, Time: 10:40 AM                     [facility] 250 DO  
Work Phone:   
[phone]  
   
                                                    Start:   
1994          Zoster Vaccines (1 of 2) Zoster Vaccines (1 of 2) [facility]  
   
                                                    Start:   
1966                              DTaP/Tdap/Td Vaccines (1   
- Tdap)                                 DTaP/Tdap/Td Vaccines (1   
- Tdap)                                 [facility]  
   
                                                    Start:   
1962                              Diabetes mellitus   
screening                 Diabetes Screening        [facility]  
   
                                                    Start:   
1962          Hepatitis C screening Hepatitis C Screening [facility]  
   
                                                    Start:   
1950                              Pneumococcal Vaccine:   
65+ Years (1 - PCV)                     Pneumococcal Vaccine:   
65+ Years (1 - PCV)                     [facility]  
   
                                                    Start:   
1945          COVID-19 Vaccine (#1) COVID-19 Vaccine (#1) [facility]  
   
                                                    Start:   
1944                              Medicare Annual Wellness   
Visit                                   Medicare Annual Wellness   
Visit (AWV)                             [facility]  
   
                                                    Start:   
1944                              Screening for   
osteoporosis              Bone Density Scan         [facility]  
   
                                       Patient Education              [facility]   
[facility]  
Work Phone:   
[phone]  
   
                                       Patient referral              [facility]   
Medical Ctr  
Work Phone:   
[phone]  
  
  
  
Payers  
  
  
                          Date         Payer Category Payer        Policy ID  
   
                          2023   Self-pay                  [id number]  
[id number]  
[id number]  
   
                          2023   Medicare                  [id number]  
[id number]  
[id number]  
   
                          2018   Private Health Insurance              [id number]  
[id number]  
   
                          1960   Private Health Insurance              [id number]  
[id number]  
   
                          1944   Unknown                   [id number]   
[id number]  
   
                          1944   Unknown                   [id number]   
[id number]  
   
                          1944   Unknown                   [id number]   
[id number]  
   
                          1944   Unknown                   [id number]   
[id number]  
   
                          1944   Unknown                   [id number]   
[id number]  
   
                          1944   Unknown                   [id number]   
[id number]  
   
                          1944   Unknown                   [id number]   
[id number]  
   
                          1944   Unknown                   [id number]   
[id number]  
   
                          1944   Unknown                   [id number]   
[id number]  
   
                          1944   Unknown                   [id number]   
[id number]  
   
                          1944   Unknown                   [id number]   
[id number]  
   
                          1944   Unknown                   [id number]   
[id number]  
   
                          1944   Unknown                   [id number]   
[id number]  
   
                          1944   Unknown                   [id number]   
[id number]  
   
                          1944   Unknown                   [id number]   
[id number]  
   
                                       Medicare                  [id number]  
   
                                       Private Health Insurance              [id number]  
[id number]  
   
                                       Private Health Insurance [facility] [id number]  
[id number]  
   
                                       Unknown                     
   
                                       Unknown                   [id number]   
[id number]  
   
                                       Unknown                   [id number]   
[id number]  
  
  
  
Social History  
  
  
                          Date         Type         Detail       Facility  
   
                          Start: 10- No alcohol use No alcohol use [facility]  
[facility] [place]   
[facility] DO  
Work Phone:   
[phone]  
   
                                        Comment on above:   Coffee 2 cups daily   
Pop every other day;   
   
                                                            1-2 cups coffee zachery  
y, not too much soda;   
   
                                                            a little in the soda  
 for flavor once in awhile;   
   
                                                            Maybe once weekly wi  
th a little soda for flavor;   
   
                          Start: 10- Sex Assigned At Birth              N  
[employer]   
[employer]  
Other Phone:   
[phone]  
   
                                                    Start: 2022  
End: 2024                         Tobacco smoking   
status NHIS                             Never smoked tobacco   
(finding)                               [facility]  
   
                          Start: 1943 Sex Assigned At Birth Female       F  
[facility]  
   
                                        Start: 10-   Tobacco use and   
exposure                                Smokeless tobacco   
non-user                                [facility]  
Work Phone:   
[phone]  
   
                                Start: 10- Alcohol intake  Current drinke  
r of   
alcohol (finding)                       [facility]  
Work Phone:   
[phone]  
   
                          Start: 10- Alcohol Comment socially     [facility]  
[facility]  
Work Phone:   
[phone]  
   
                          Start: 1943 Sex Assigned At Birth Not on file  U  
[facility]  
Work Phone:   
[phone]  
   
                                                    Start: 2023  
End: 10-                         Exposure to   
SARS-CoV-2 (event)        Not sure                  [facility]  
  
  
  
Medical Equipment  
  
  
                                Procedure Code  Equipment Code  Equipment Origin  
al   
Text                                    Equipment   
Identifier                              Dates  
   
                                                    Arthroplasty,   
knee, total,   
minimally invasive                                  Orthopaedic cement,   
non-medicated                           ()[id number]  
[id number]  
[id number] FDA                              Start:   
2021  
   
                                                    Arthroplasty,   
knee, total,   
minimally invasive                                  Uncoated knee femur   
prosthesis                              ()[id number]  
[id number] FDA                                Start:   
2021  
   
                                                    Arthroplasty,   
knee, total,   
minimally invasive                      Tibial insert       ()[id number]  
[id number] 8667 FDA                                Start:   
2021  
   
                                                    Arthroplasty,   
knee, total,   
minimally invasive                                  Uncoated knee tibia   
prosthesis, metallic                    ()[id number]  
[id number] FDA                                Start:   
2021  
   
                                                    Arthroplasty,   
knee, total,   
minimally invasive                                  Polyethylene patella   
prosthesis                              ()[id number]  
[phone] 0135 FDA                                Start:   
2021  
  
  
  
Goals  
  
  
                                Date            Patient Goal    Desired Activity  
/State  
   
                                                                  
  
  
  
Functional Status  
  
  
                          Date         Assessment   Result       Facility  
   
                          01-   Functional status Patient at Baseline [facility]   
Ctr  
Work Phone: [phone]  
   
                                2024      Functional status Disability Sta  
tus Patient is   
Progressing Toward Baseline             [facility]   
Ctr  
Work Phone: [phone]  
  
  
  
Mental Status  
  
  
                          Date         Assessment   Result       Facility  
   
                                01-      Cognitive function Cognitive Sta  
tus Patient at   
Baseline                                [facility]   
Ctr  
Work Phone: [phone]  
  
  
  
Clinical Notes 2012 to 2024  
  
  
                                Note Date & Type Note            Facility  
  
  
  
                                                    2024 Evaluation note   
  
  
  
                                                    Encounter   
Date                      Diagnosis                 Assessment   
Notes  
   
                                                                                        Other specified   
postprocedural   
states (ICD-10 -   
Z98.890)                                                      
  
  
[employer] [facility]  
Other Phone: [phone]- Note---------------------  
From: CLAUDIO BRITO DO  
To: [facility] Clinical Pool ([facility]);  
Sent: 2024 07:56:51 EST  
Subject: FW: Medication Management  
Due Date/Time: 2024 17:19:00 EST  
Caller Name: LEONARDA JESSICA MALIK; Caller Number: H [phone]  
------------------------------------------  
From: [facility]  
To: CLAUDIO BRITO DO  
Sent: 2024 4:19:56 PM CST  
Subject: Medication Management  
Due: 2024 12:32:35 AM CST  
** On Hold Pending Signature **  
Dispensed Drug: gabapentin (gabapentin 600 mg oral tablet), TAKE 1 TABLET BY 
MOUTH THREE TIMES DAILY  
Quantity: 90 tab(s)  
Days Supply: 30  
Refills: 0  
Substitutions Allowed  
Notes from Pharmacy:  
------------------------------------------  
---------------------  
From: Alma Delia Berumen  
To: [place] [address]  
Sent: 2024 11:17:42 EST  
Subject: FW: Medication Management  
** Not Approved: sent **  
gabapentin (Gabapentin 600 MG Oral Tablet) TAKE 1 TABLET BY MOUTH THREE TIMES 
DAILY  
Qty: 90 tab(s) Days Supply: 30 Refills: 0  
Substitutions Allowed Route To Pharmacy - [place] [address]  
Signed by [facility]- Evaluation note*   
  
                      Encounter Date Diagnosis  Assessment Notes Treatment Notes  
 Treatment   
Clinical Notes  
   
                                                Other specified   
postprocedural states   
(ICD-10 - Z98.890)                                            
  
  
[employer] [facility] [employer] [facility]  
Other Phone: [phone]- Note---------------------  
From: CLAUDIO BRITO DO  
To: [facility] Clinical Pool ([facility]);  
Sent: 1/15/2024 07:46:23 EST  
Subject: FW: Medication Management  
Due Date/Time: 1/15/2024 14:17:00 EST  
Caller Name: JESSICA QUICK; Caller Number: H [phone]  
------------------------------------------  
From: [employer]  
To: CLAUDIO BRITO DO  
Sent: 2024 1:17:50 PM CST  
Subject: Medication Management  
Due: January 15, 2024 12:23:23 AM CST  
** On Hold Pending Signature **  
Dispensed Drug: gabapentin (gabapentin 600 mg oral tablet), TAKE 1 TABLET BY 
MOUTH THREE TIMES DAILY  
Quantity: 90 tab(s)  
Days Supply: 30  
Refills: 0  
Substitutions Allowed  
Notes from Pharmacy:  
------------------------------------------  
---------------------  
From: LETY CARSON  
To: [facility]  
Sent: 1/15/2024 08:17:11 EST  
Subject: FW: Medication Management  
** Not Approved: duplicate **  
gabapentin (Gabapentin 600 MG Oral Tablet) TAKE 1 TABLET BY MOUTH THREE TIMES 
DAILY  
Qty: 90 tab(s) Days Supply: 30 Refills: 0  
Substitutions Allowed Route To Pharmacy - [facility]  
Signed by LETY CARSON [facility]- Progress note  
  
  
  
  
                                        Author              Hermann Roque  
[facility]  
2024 5:43pm  
   
                                        Note Date/Time      2024 7:  
14pm  
   
                                                    [facility]  
ENTER  
[address] [place] [address]  
  
Hospitalist Progress Note  
Signed  
  
Patient: Jessica Quick MR#: [id number]  
[id number]  
: 1943 Acct:[id number]  
  
Age/Sex: 80 / F Adm Date:   
4  
Loc:  Room: [address] [place] [address] Type: DIS INOo  
Attending Dr: Hermann Roque DO  
  
Copies to: ~  
  
  
Date of Service:  
2024  
  
  
Subjective  
Subjective  
Narrative:  
Patient seen and evaluated at bedside, complain of nausea and dry heaving after   
getting hydralazine last evening. Denies any dizziness, headache, nausea or 
vomiting,   
blood pressure improved. Denies any chest pain, shortness of breath. Discussed 
plan   
to discharge patient later today.  
  
Exam  
Physical Exam  
Vital Signs:  
  
  
  
  
Temp Pulse Resp BP Pulse Ox O2 Del Method  
  
98.1 F 64 16 174/98 H 98 Room Air  
  
24 17:35 24 17:35 24 17:35 24 17:35 24 17:35 
24   
17:35  
  
  
  
Narrative:  
  
CONST- Appears well -developed and well nourished , resting comfortably in bed  
CARDIAC-normal rate, regular rhythm, normal S1 & S2.  
PULM-CTA bilaterally, RA, no accessory muscle use or cough noted  
ABD - Soft. Bowel sounds are normal. No distention No tenderness  
EXTREM-no edema BLE calves nontender  
SKIN- W/D good turgor  
  
  
Objective  
Lab Results  
  
24 06:06  
  
24 06:06  
  
  
Meds  
Allergies and Active Meds  
Allergies  
  
No Known Allergies Allergy (Verified 22 16:44)  
  
  
  
Active Meds:  
Active Medications  
  
  
  
Generic Name Dose Route Start Last Admin  
  
Trade Name Freq PRN Reason Stop Dose Admin  
  
Acetaminophen 1,000 mg 24 21:44 24 17:44  
  
Acetaminophen 500 Mg Tablet PO 25 21:43 1,000 mg  
  
Q6H PRN Administration  
  
Fever or Pain  
  
Apixaban 5 mg 24 15:30 24 08:38  
  
Apixaban 5 Mg Tablet PO 25 15:29 5 mg  
  
BID CLIVE Administration  
  
Atorvastatin Calcium 40 mg 24 22:00 24 22:16  
  
Atorvastatin 40 Mg Tablet PO 25 21:59 40 mg  
  
QHS CLIVE Administration  
  
Hydrochlorothiazide 25 mg 24 10:10 24 11:09  
  
Hydrochlorothiazide 25 Mg Tablet PO 25 10:09 25 mg  
  
DAILY CLIVE Administration  
  
Latanoprost 1 drops 24 22:00 24 22:15  
  
Latanoprost 0.005% Op Soln 50 Drops/2.5 Ml Bottle EYE-BOTH 25 21:59 1drops  
  
HS CLIVE Administration  
  
Meclizine HCl 12.5 mg 24 14:53  
  
Meclizine 12.5 Mg Tablet PO 25 14:52  
  
BID PRN  
  
Vertigo  
  
Metoprolol Tartrate 50 mg 24 21:00 24 08:38  
  
Metoprolol Tartrate 50 Mg Tablet PO 25 20:59 50 mg  
  
BID CLIVE Administration  
  
Ondansetron HCl 4 mg 24 22:33  
  
Ondansetron 4 Mg/2 Ml Vial IV-PUSH 25 22:32  
  
Q6H PRN  
  
Nausea And Vomiting  
  
Prochlorperazine Edisylate 10 mg 24 22:33  
  
Prochlorperazine Edisylate 10 Mg/2 Ml Vial IV-PUSH 25 22:32  
  
Q4H PRN  
  
Nausea And Vomiting  
  
Sodium Chloride 0 ml 24 11:01  
  
Sodium Chloride 0.9 % 10 Ml Syringe IV-PUSH 25 11:00  
  
PRN PRN  
  
Flush  
  
Timolol Maleate 1 drops 24 21:00  
  
Timolol Mal 0.5% Op Soln 100 Drops/5 Ml Bottle EYE-BOTH 25 20:59  
  
BID CLIVE  
  
Valsartan 320 mg 24 09:00 24 08:38  
  
Valsartan 320 Mg Tablet PO 25 08:59 320 mg  
  
DAILY CLIVE Administration  
  
  
  
  
A&P - Hospitalist  
Assessment/Plan  
(1) Pre-syncope:  
(2) Hypertensive urgency:  
  
Plan  
Presyncope, unclear etiology, likely due to accelerated blood pressure, 
noncompliant   
with medications  
Hypertensive urgency?improved  
Denies any dizziness this morning, blood pressure improved however still not   
controlled. No chest pain or shortness of breath  
?Orthostatic vitals negative  
?Head CT did not show any acute intracranial pathology  
? Troponin flat  
?Echocardiogram with EF of 55-50%, mild tricuspid regurgitation, trace mitral   
regurgitation  
?PT/OT to eval and treat  
?Meclizine as needed  
  
Chronic conditions:  
Atrial fibrillation/HTN/HLD/chronic pain?resume home medications except for   
gabapentin and Flexeril  
  
CODE STATUS: Full code  
DVT prophylaxis: On Eliquis  
Discharge disposition: Medically stable to discharge home when ride is available  
  
  
  
  
I personally reviewed the relevant history, the key elements of the physical 
exam,   
and discussed and formulated the plan of care with the nurse practitioner,and I   
confirm the nurse practitioner's documentation as written.  
  
Hermann Roque DO  
Internal Medicine  
Hospitalist  
Documented By: STEVE Mcconnell 24  
Signed By: <Electronically signed by STEVE Blackwell>  
24  
<Electronically signed by Hermann Roque DO>  
01/10/24 0166  
   
  
[facility]  
Work Phone: [phone]- History and physical note  
  
  
  
  
                                        Author              Hermann Roque  
[facility]  
2024 3:53pm  
   
                                        Note Date/Time      2024 3:  
03pm  
   
                                                    [facility]  
ENTER  
[address] [place] [address]  
  
Hospitalist H&P  
Signed  
  
Patient: Jessica Quick MR#: [id number]  
[id number]  
: 1943 Acct:[id number]  
  
Age/Sex: 80 / F Adm Date:   
4  
Loc:  Room: [address] [place] [address] Type: ADM INOo  
Attending Dr: Hermann Roque DO  
  
Copies to: Claudio Brito, DO  
Hermann Roque, DO  
STEVE Mcconnell~  
  
  
HPI  
DATE OF EXAMINATION: 24  
CHIEF COMPLAINT: Dizziness  
HISTORY OF PRESENT ILLNESS:  
Ms. Jessica Quick is a 80-year-old female with a past medical history of atrial   
fibrillation on Eliquis, CAD x 1 stent () hypertension, hyperlipidemia who   
presented to the emergency room with complaints of dizziness that started 
yesterday   
when she got up around 9 AM. She reports associated symptoms of dry heaves and   
diaphoresis. She states that she spent most of the day laying bed yesterday and 
when   
she got up this morning, she felt lightheaded and dizzy with slight confusion. 
She   
denies having headaches, earache, fever or chills. Currently laying comfortably 
in   
bed, does not have any dizziness at this time. Denies chest pain, shortness of   
breath, palpitations, abdominal pain, diarrhea, constipation, dysuria, 
frequency,   
urgency. Afebrile  
  
Upon arrival to the ER, EKG showed atrial fibrillation with slow ventricular   
response, heart rate 59 bpm. Head CT was negative for any acute intracranial   
pathology. Troponin 29.3. She was administered normal saline 500 cc bolus, 
shewill be   
admitted by the hospitalist team for further evaluation and management.  
  
Review of Systems  
Review of Systems  
Review of systems:  
10 point review of systems obtained, negative unless noted in the HPI below  
  
PMFSH  
Source: Old Records Reviewed  
Medical History (Reviewed 24 @ 15:14 by STEVE Mcconnell)  
  
A-fib  
Arthritis  
Coronary artery disease  
x 1 stent .  
Depression  
DJD (degenerative joint disease)  
Glaucoma  
Hx of gastroesophageal reflux (GERD)  
in past  
Hypercholesteremia  
Hyperlipidemia  
Hypertension  
Losing weight  
patient states not eating well-to talk to PCP about  
Thalassemia  
  
Surgical History (Reviewed 24 @ 15:14 by STEVE Mcconnell)  
  
History of cardiac catheterization  
Hx of cataract surgery  
both eyes  
Hx of laparoscopy  
  
Family History (Reviewed 24 @ 15:14 by STEVE Mcconnell)  
Mother Myocardial infarction  
Father COPD (chronic obstructive pulmonary disease)  
Brother Lung cancer  
Son Thalassemia  
  
Social History  
Smoking Status: Never smoker  
Substance Use Type: None  
Social History Comments: adult son  
  
Meds  
Medications and Allergies  
Allergies  
  
No Known Allergies Allergy (Verified 22 16:44)  
  
  
  
Home Medications  
  
gabapentin 300 mg capsule 300 mg PO TID 18 [History Confirmed 24]  
apixaban 5 mg tablet (Eliquis) 5 mg PO Q12H a-fib 18 [History Confirmed   
24]  
metoprolol tartrate 25 mg tablet 50 mg PO BID 18 [History Confirmed 
24]  
latanoprost 0.005 % eye drops (Xalatan) 1 drp Eye-Both HS glaucoma 19 
[History   
Confirmed 24]  
nitroglycerin 0.4 mg sublingual tablet (Nitrostat) 0.4 mg sublingual Q3-5MIN 
PRNChest   
Pain 19 [History Confirmed 24]  
atorvastatin 40 mg tablet 40 mg PO QHS hyperlipidemia 21 [History 
Confirmed   
24]  
cyclobenzaprine 10 mg tablet 10 mg PO DAILY PRN Pain 24 [History Confirmed
   
24]  
tramadol 50 mg tablet 1 mg PO DAILY PRN Pain 24 [History Confirmed 
24]  
valsartan 320 mg tablet 320 mg PO DAILY 24 [History Confirmed 24]  
  
  
  
Exam  
Physical Exam  
Vital Signs:  
  
  
  
  
Temp Pulse Resp BP Pulse Ox O2 Del Method  
  
97 F L 63 18 190/90 H 96 Room Air  
  
24 11:06 24 14:16 24 14:16 24 14:16 24 14:16 
24   
14:16  
  
  
  
Narrative:  
  
CONST- Appears well -developed and well nourished , resting comfortably in bed  
HEAD - Normocephalic and atraumatic  
EENT-Sclera nonicteric and conjunctive are nonerythemic, moist oral mucosa, 
pharynx   
clear  
NECK-Supple, no cervical lymphadenopathy  
CARDIAC-normal rate, regular rhythm, normal S1 & S2.  
PULM-CTA bilaterally, RA, no accessory muscle use or cough noted  
ABD - Soft. Bowel sounds are normal. No distention No tenderness  
EXTREM-no edema BLE calves nontender  
SKIN- W/D good turgor  
MS- MAEX4 spontaneously with equal with equal strength  
NEURO- A&Ox3 speech clear and tongue midline, equal facial symmetry no focal 
motor   
deficits  
PSYCH-Mood, affect and behavior appropriate  
  
Results  
Lab Results  
Labs:  
Laboratory Last Values  
  
  
  
Corrected WBC 4.5 X10E3/uL (3.8-11.6) 24 11:05  
  
Uncorrected WBC Count 4.5 x10E3/uL (3.8-11.6) 24 11:05  
  
RBC 4.37 X10E6/uL (3.60-5.00) 24 11:05  
  
Hgb 9.1 g/dL (11.8-15.4) L 24 11:05  
  
Hct 29.0 % (34.0-46.4) L 24 11:05  
  
MCV 66.5 fl () L 24 11:05  
  
MCH 20.8 pg (24.7-34.3) L 24 11:05  
  
MCHC 31.3 g/dL (32.0-35.0) L 24 11:05  
  
RDW 16.4 % (11.9-15.3) H 24 11:05  
  
Plt Count 299 x10E3/uL (150-450) 24 11:05  
  
MPV 7.7 fl (6.3-10.7) 24 11:05  
  
Neut % (Auto) 66.2 % (.) 24 11:05  
  
Lymph % (Auto) 23.1 % (.) 24 11:05  
  
Mono % (Auto) 7.5 % (.) 24 11:05  
  
Eos % (Auto) 2.0 % (.) 24 11:05  
  
Baso % (Auto) 1.2 % (.) 24 11:05  
  
Nucleat RBC Rel Count 0.1 /100 WBC (0-0.5) 24 11:05  
  
Neut # (Auto) 3.0 x10E3/uL (1.8-7.7) 24 11:05  
  
Lymph # (Auto) 1.0 x10E3/uL (1.00-4.8) 24 11:05  
  
Mono # (Auto) 0.3 x10E3/uL (0.0-0.8) 24 11:05  
  
Eos # (Auto) 0.1 x10E3/uL (0.0-0.45) 24 11:05  
  
Baso # (Auto) 0.1 x10E3/uL (0.0-0.2) 24 11:05  
  
Monocyte Dist Width 18.46 % (0.00-20.00) 24 11:05  
  
Platelet Estimate Normal (Normal) 24 11:05  
  
Plt Morphology Comment Normal (Normal) 24 11:05  
  
RBC Morphology N/A 24 11:05  
  
Polychromasia Slight 24 11:05  
  
Hypochromasia Moderate 24 11:05  
  
Anisocytosis Slight 24 11:05  
  
Microcytosis Moderate 24 11:05  
  
Ovalocytes Moderate 24 11:05  
  
Schistocytes Moderate 24 11:05  
  
PT 12.3 Seconds (9.0-12.9) 24 11:07  
  
INR 1.1 24 11:07  
  
APTT 29.3 Seconds (25.1-36.5) 24 11:07  
  
PHA Creatinine Clear 40.64 24 11:04  
  
Sodium 140 mmol/L (136-145) 24 11:04  
  
Potassium 3.8 mmol/L (3.5-5.1) 24 11:04  
  
Chloride 106 mmol/L () 24 11:04  
  
Carbon Dioxide 28.0 mmol/L (21.0-31.0) 24 11:04  
  
Anion Gap 9.8 mEq/L (6.0-15.0) 24 11:04  
  
BUN 13 mg/dL (7-25) 24 11:04  
  
Creatinine 1.01 mg/dL (0.60-1.20) 24 11:04  
  
Est GFR (CKD-EPI) 56.627 mL/Min 24 11:04  
  
Glucose 108 mg/dL () H 24 11:04  
  
Calcium 9.0 mg/dL (8.6-10.3) 24 11:04  
  
Magnesium 2.0 mg/dL (1.9-2.7) 24 11:04  
  
Total Bilirubin 1.0 mg/dl (0.3-1.0) 24 11:04  
  
AST 23 U/L (13-39) 24 11:04  
  
ALT 17 U/L (7-52) 24 11:04  
  
Alkaline Phosphatase 58 U/L () 24 11:04  
  
Troponin I High Sens 29.3 pg/mL (0.0-15.0) H 24 11:05  
  
B-Natriuretic Peptide 353.0 pg/mL (5-100) H 24 11:05  
  
Total Protein 6.6 gm/dL (6.4-8.9) 24 11:04  
  
Albumin 4.2 gm/dL (3.5-5.7) 24 11:04  
  
Globulin 2.4 gm/dL 24 11:04  
  
Albumin/Globulin Ratio 1.8 24 11:04  
  
Urine Color Yellow (Yellow) 24 12:15  
  
Urine Appearance Clear (Clear) 24 12:15  
  
Urine pH 7.0 (5.0-9.0) 24 12:15  
  
Ur Specific Gravity 1.007 (1.001-1.030) 24 12:15  
  
Urine Protein Trace mg/dL (Negative) H 24 12:15  
  
Urine Glucose (UA) Normal mg/dL (Normal) 24 12:15  
  
Urine Ketones Negative (Negative) 24 12:15  
  
Urine Occult Blood Negative (Negative) 24 12:15  
  
Urine Nitrite Negative (Negative) 24 12:15  
  
Urine Bilirubin Negative (Negative) 24 12:15  
  
Urine Urobilinogen Normal mg/dL (Normal) 24 12:15  
  
Ur Leukocyte Esterase 1+ (Negative) H 24 12:15  
  
Urine RBC 0-1 /HPF (0-4) 24 12:15  
  
Urine WBC 0-1 /HPF (0-4) 24 12:15  
  
Ur Squamous Epith Cells None seen /HPF (0-2) 24 12:15  
  
Urine Bacteria None seen (None Seen) 24 12:15  
  
Hyaline Casts None seen /LPF (0-8) 24 12:15  
  
Slides for Path Review Cancelled 24 11:05  
  
  
  
  
Assessment & Plan  
Assessment/Plan  
(1) Pre-syncope:  
(2) Hypertensive urgency:  
  
Plan  
Presyncope, unclear etiology  
Hypertensive urgency  
P/w with complaints of dizziness and lightheaded that started yesterday with   
associated symptoms of dry heaves and diaphoresis with slight confusion. 
Currently   
denies any dizziness  
?Head CT did not show any acute intracranial pathology  
?Initial troponin slightly elevated, denies chest pain or shortness of breath, 
will   
trend  
?Administered 500 cc liters of normal saline, will gently hydrate  
?Obtain orthostatic vitals  
?PT/OT to eval and treat  
?Meclizine as needed  
  
Chronic conditions:  
Atrial fibrillation/HTN/HLD/chronic pain?resume home medications except for   
gabapentin and Flexeril  
  
CODE STATUS: Full code  
DVT prophylaxis: On Eliquis  
IP vs OBS Justification  
Based on differential dx, clinical care plan, and risk of adverse events, if   
untreated, in my clinical judgement this patient requires an acute care setting 
as:   
OBSERVATION because of an expectation of an under 2 midnight stay.  
Estimated length of stay (# of days): 2  
  
  
  
  
I personally saw this patient on the day of encounter, reviewed the relevant 
history,   
performed the key elements of the physical exam, and discussed and formulated 
the   
plan of care with the nurse practitioner, and I confirm the nursepractitioner's   
documentation as written.  
  
Hermann Roque DO  
Internal Medicine  
Hospitalist  
Documented By: STEVE Mcconnell 24 [id number]  
Signed By: <Electronically signed by STEVE Blackwell>  
24 1527  
<Electronically signed by Hermann Roque DO>  
24 [id number]  
   
  
[facility]  
Work Phone: [phone]- Evaluation note*   
  
                      Encounter Date Diagnosis  Assessment Notes Treatment Notes  
 Treatment   
Clinical Notes  
   
                                                Other specified   
postprocedural states   
(ICD-10 - Z98.890)                                            
  
  
[facility]  
Other Phone: [phone]- Evaluation note*   
  
                      Encounter Date Diagnosis  Assessment Notes Treatment Notes  
 Treatment   
Clinical Notes  
   
                                                Other specified   
postprocedural states   
(ICD-10 - Z98.890)                                            
  
  
[facility]  
Other Phone: [phone]- Evaluation note*   
  
                      Encounter Date Diagnosis  Assessment Notes Treatment Notes  
 Treatment   
Clinical Notes  
   
                                                Other specified   
postprocedural states   
(ICD-10 - Z98.890)                                            
  
  
[facility]  
Other Phone: [phone]- History of Present illness Narrative* 
  Tayo Norris, DO - 10/10/2023 10:10 AM EDTFormatting of this note is 
  different from the original.  
Subjective  
Jessica Quick is a 80 y.o. female  
  
Chief Complaint  
Follow-up  
  
  
  
80-year-old active female returns for follow-up and is doing well other than 
dyspnea on exertion which is likely related to chronic atrial fibrillation and a
second accelerated hypertension. She has been relegated to rate control therapy 
at her own request, intolerant to amiodarone. Heart catheterization 2019 
revealed normal coronary arteries with widely patent RCA stent and normal left 
ventricular function. She has had atrial fibrillation ever since 2019. She has 
accelerated hypertension todayat 200/100 on my exam on current therapies which 
we reviewed  
  
She has no angina or hospitalizations  
  
Recommendations: Escalate valsartan 320 daily, initiate hydrochlorothiazide 25 
daily potassium 10 mEq daily, follow-up with blood pressure check in 
approximately 8 to 10 weeks, she can do this with nurse practitioner, I will see
her again in 1 year, we counseled her on lifestyle choices, sodium restriction 
and dietary discretion.  
  
  
  
Review of Systems  
All other systems reviewed and are negative.  
  
  
Objective  
Physical Exam  
  
Visit Vitals  
BP (!) 200/98 (BP Location: Left arm, Patient Position: Sitting)  
Pulse 66  
Ht 1.651 m (5' 5 )  
Wt 55.8 kg (123 lb)  
BMI 20.47 kg/m  
Smoking Status Never  
BSA 1.6 m  
  
  
Assessment/Plan  
No diagnosis found.  
Electronically signed by Tayo Norris DO at 10/10/2023 11:12 AM EDT  
  
[facility]  
Work Phone: [phone]- Instructions* Patient Instructions*   
  
Alexus Hannah LPN - 10/10/2023 10:10 AM EDT  
  
Formatting of this note might be different from the original.  
Please bring all medicines, vitamins, and herbal supplements with you when you 
come to the office.  
  
Prescriptions will not be filled unless you are compliant with your follow up 
appointments or have a follow up appointment scheduled as per instruction of 
your physician. Refills should be requested at the time of your visit.  
Electronically signed by Alexus Hannah LPN at 10/10/2023 10:38 AM EDT  
  
  
  
[facility]  
Work Phone: [phone]- Evaluation note*   
  
                      Encounter Date Diagnosis  Assessment Notes Treatment Notes  
 Treatment   
Clinical Notes  
   
                                        29 Sep, 2023        Osteoarthritis of   
knee (ICD-10 - M17.9)                                           
   
                                        29 Sep, 2023        Primary   
osteoarthritis of   
both knees (ICD-10 -   
M17.0)                                                        
  
  
[facility]  
Other Phone: [phone]- Evaluation note*   
  
                      Encounter Date Diagnosis  Assessment Notes Treatment Notes  
 Treatment   
Clinical Notes  
   
                                                Primary   
osteoarthritis of   
right hand (ICD-10 -   
M19.041)                                                    Patient is   
progressing well   
from previous   
injections. She   
is to use   
Voltaren Gel as   
needed for pain   
and or   
inflammation.  
Patient can f/u   
PRN.  
  
   
                                                Trigger finger, righ  
t   
middle finger (ICD-10   
- M65.331)                                                    
   
                                                Trigger finger, righ  
t   
index finger (ICD-10   
- M65.321)                                                    
   
                                                Hand pain, right   
(ICD-10 - M79.641)                                            
  
  
[facility]  
Other Phone: [phone]- Evaluation note*   
  
                      Encounter Date Diagnosis  Assessment Notes Treatment Notes  
 Treatment   
Clinical Notes  
   
                                                Primary   
osteoarthritis of   
both knees (ICD-10 -   
M17.0)                                                        
   
                                                Osteoarthritis of   
knee (ICD-10 - M17.9)                                           
  
  
[facility]  
Other Phone: [phone]- Evaluation note*   
  
                      Encounter Date Diagnosis  Assessment Notes Treatment Notes  
 Treatment   
Clinical Notes  
   
                                                Osteoarthritis of   
knee (ICD-10 - M17.9)                                           
  
  
[facility]  
Other Phone: [phone]- Evaluation note*   
  
                      Encounter Date Diagnosis  Assessment Notes Treatment Notes  
 Treatment   
Clinical Notes  
   
                                                Osteoarthritis of   
knee (ICD-10 - M17.9)                                           
   
                                                Primary   
osteoarthritis of   
both knees (ICD-10 -   
M17.0)                                                        
  
  
[facility]  
Other Phone: [phone]- Evaluation note*   
  
                      Encounter Date Diagnosis  Assessment Notes Treatment Notes  
 Treatment   
Clinical Notes  
   
                                                Primary   
osteoarthritis of   
right hand (ICD-10 -   
M19.041)                                                      
   
                                                Trigger finger, righ  
t   
middle finger (ICD-10   
- M65.331)                                          We performed a   
kenalog   
cortisone   
injection into   
the palmar   
aspect of the   
right index and   
right middle   
fingers at the   
A1 pulley under   
sterile   
technique. The   
patient   
tolerated this   
well without   
complication. We   
discussed that   
the finger may   
feel numb and   
tingle for hours   
after this   
injection.  
                                          
   
                                                Trigger finger, righ  
t   
index finger (ICD-10   
- M65.321)                                                    
   
                                                Hand pain, right   
(ICD-10 - M79.641)                                            
  
  
[facility]  
Other Phone: [phone]- Evaluation note*   
  
                      Encounter Date Diagnosis  Assessment Notes Treatment Notes  
 Treatment   
Clinical Notes  
   
                                        12 May, 2023        Osteoarthritis of   
knee (ICD-10 - M17.9)                                           
  
  
[facility]  
Other Phone: [phone]- Evaluation note*   
  
                      Encounter Date Diagnosis  Assessment Notes Treatment Notes  
 Treatment   
Clinical Notes  
   
                                        01 May, 2023        Osteoarthritis of   
knee (ICD-10 - M17.9)                                           
  
  
[facility]  
Other Phone: [phone]- Evaluation note*   
  
                      Encounter Date Diagnosis  Assessment Notes Treatment Notes  
 Treatment   
Clinical Notes  
   
                                                Primary   
osteoarthritis of   
both knees (ICD-10 -   
M17.0)                                                        
  
  
[facility]  
Other Phone: [phone]- Evaluation note*   
  
                      Encounter Date Diagnosis  Assessment Notes Treatment Notes  
 Treatment   
Clinical Notes  
   
                                                Osteoarthritis of   
knee (ICD-10 - M17.9)                                           
  
  
[facility]  
Other Phone: [phone]- Evaluation note*   
  
                      Encounter Date Diagnosis  Assessment Notes Treatment Notes  
 Treatment   
Clinical Notes  
   
                                                Primary   
osteoarthritis of   
both knees (ICD-10 -   
M17.0)                                                        
   
                                                Osteoarthritis of   
knee (ICD-10 - M17.9)                                           
  
  
[facility]  
Other Phone: [phone]- Evaluation note*   
  
                      Encounter Date Diagnosis  Assessment Notes Treatment Notes  
 Treatment   
Clinical Notes  
   
                                        22 Mar, 2023        Osteoarthritis of   
knee (ICD-10 - M17.9)                                           
  
  
[facility]  
Other Phone: [phone]- Evaluation note*   
  
                      Encounter Date Diagnosis  Assessment Notes Treatment Notes  
 Treatment   
Clinical Notes  
   
                                        09 Mar, 2023        Primary   
osteoarthritis of   
both knees (ICD-10 -   
M17.0)                                                        
  
  
[facility]  
Other Phone: [phone]- Evaluation note*   
  
                      Encounter Date Diagnosis  Assessment Notes Treatment Notes  
 Treatment   
Clinical Notes  
   
                                        10 Feb, 2023        Osteoarthritis of   
knee (ICD-10 - M17.9)                                           
   
                                        10 Feb, 2023        Primary   
osteoarthritis of   
both knees (ICD-10 -   
M17.0)                                                        
  
  
[facility]  
Other Phone: [phone]- Evaluation note*   
  
                      Encounter Date Diagnosis  Assessment Notes Treatment Notes  
 Treatment   
Clinical Notes  
   
                                                Primary   
osteoarthritis of   
both knees (ICD-10 -   
M17.0)                                                        
  
  
[facility]  
Other Phone: [phone]- Evaluation note*   
  
                      Encounter Date Diagnosis  Assessment Notes Treatment Notes  
 Treatment   
Clinical Notes  
   
                                                Primary   
osteoarthritis of   
both knees (ICD-10 -   
M17.0)                                                        
   
                                                Osteoarthritis of   
knee (ICD-10 - M17.9)                                           
  
  
[facility]  
Other Phone: [phone]- Evaluation note*   
  
                      Encounter Date Diagnosis  Assessment Notes Treatment Notes  
 Treatment   
Clinical Notes  
   
                                        20 Dec, 2022        Primary   
osteoarthritis of   
both knees (ICD-10 -   
M17.0)                                                        
   
                                        20 Dec, 2022        Osteoarthritis of   
knee (ICD-10 - M17.9)                                           
  
  
[facility]  
Other Phone: [phone]- Evaluation note*   
  
                                Encounter Date  Diagnosis       Assessment   
Notes                     Treatment Notes           Treatment   
Clinical Notes  
   
                                        14 Dec, 2022        Primary   
osteoarthritis of   
left knee (ICD-10 -   
M17.12)                                             She hasDOS:   
2021 left TKA;   
2022 GARY left   
TKA  
Jessica is here today   
about 1 year s/p left   
TKA. Had issues with   
her left knee since   
her surgery. We also   
had a do a   
manipulation for her   
which obtained full   
flexion and extension   
intraoperatively but   
the patient failed to   
maintain this with   
therapy. When she   
initially presented   
to me she was in a   
wheelchair unable to   
ambulate secondary to   
knee pain and today   
she is walking   
although she has   
limited knee motion.   
Her exam shows he has   
more motion passively   
than what she   
actively performs.   
Overall subjectively   
she is unhappy with   
her knee although she   
has had improvement   
in ambulation   
objectively since she   
is no longer using   
wheelchair. At this   
point I would tell   
her to continue to   
use the knee to her   
happiness and   
abilities. She does   
take tramadol   
occasionally for pain   
which I do prescribe.   
She is having no   
adverse reaction with   
this. She tolerates   
the medication well.   
I would leave her   
follow-up open-ended   
at this point. She   
can return with any   
issues.  
                                        Patient is   
progressing   
well. Continue   
physical therapy   
exercises and   
TKA precautions.   
Instructed   
patient to call   
with any   
questions or   
concerns.  
  
   
                                        14 Dec, 2022        Status post total   
left knee   
replacement (ICD-10   
- Z96.652)                                                    
   
                                        14 Dec, 2022        Stiffness of left   
knee (ICD-10 -   
M25.662)                                                      
  
  
[facility]  
Other Phone: [phone]- Evaluation note*   
  
                      Encounter Date Diagnosis  Assessment Notes Treatment Notes  
 Treatment   
Clinical Notes  
   
                                        08 Dec, 2022        Primary   
osteoarthritis of   
both knees (ICD-10 -   
M17.0)                                                        
  
  
[facility]  
Other Phone: [phone]- Evaluation note*   
  
                      Encounter Date Diagnosis  Assessment Notes Treatment Notes  
 Treatment   
Clinical Notes  
   
                                                Osteoarthritis of   
knee (ICD-10 - M17.9)                                           
   
                                                Primary   
osteoarthritis of   
both knees (ICD-10 -   
M17.0)                                                        
  
  
[facility]  
Other Phone: [phone]- Evaluation note*   
  
                      Encounter Date Diagnosis  Assessment Notes Treatment Notes  
 Treatment   
Clinical Notes  
   
                                        15 Nov, 2022        Osteoarthritis of   
knee (ICD-10 - M17.9)                                           
   
                                        15 Nov, 2022        Primary   
osteoarthritis of   
both knees (ICD-10 -   
M17.0)                                                        
  
  
[facility]  
Other Phone: [phone]- Evaluation note*   
  
                      Encounter Date Diagnosis  Assessment Notes Treatment Notes  
 Treatment   
Clinical Notes  
   
                                                Primary   
osteoarthritis of   
both knees (ICD-10 -   
M17.0)                                                        
  
  
[facility]  
Other Phone: [phone]- Evaluation note*   
  
                      Encounter Date Diagnosis  Assessment Notes Treatment Notes  
 Treatment   
Clinical Notes  
   
                                                Osteoarthritis of   
knee (ICD-10 - M17.9)                                           
  
  
[facility]  
Other Phone: [phone]- Evaluation note*   
  
                      Encounter Date Diagnosis  Assessment Notes Treatment Notes  
 Treatment   
Clinical Notes  
   
                                        25 Oct, 2022        Osteoarthritis of   
knee (ICD-10 - M17.9)                                           
  
  
[facility]  
Other Phone: [phone]- Evaluation note*   
  
                      Encounter Date Diagnosis  Assessment Notes Treatment Notes  
 Treatment   
Clinical Notes  
   
                                        17 Oct, 2022        Osteoarthritis of   
knee (ICD-10 - M17.9)                                           
  
  
[facility]  
Other Phone: [phone]- Evaluation note*   
  
                      Encounter Date Diagnosis  Assessment Notes Treatment Notes  
 Treatment   
Clinical Notes  
   
                                        26 Sep, 2022        Osteoarthritis of   
knee (ICD-10 - M17.9)                                           
  
  
[facility]  
Other Phone: [phone]- Evaluation note*   
  
                      Encounter Date Diagnosis  Assessment Notes Treatment Notes  
 Treatment   
Clinical Notes  
   
                                        15 Sep, 2022        Osteoarthritis of   
knee (ICD-10 - M17.9)                                           
  
  
[facility]  
Other Phone: [phone]- Evaluation note*   
  
                      Encounter Date Diagnosis  Assessment Notes Treatment Notes  
 Treatment   
Clinical Notes  
   
                                        30 Aug, 2022        Osteoarthritis of   
knee (ICD-10 - M17.9)                                           
  
  
[facility]  
Other Phone: [phone]- Evaluation note*   
  
                      Encounter Date Diagnosis  Assessment Notes Treatment Notes  
 Treatment   
Clinical Notes  
   
                                        15 Aug, 2022        Osteoarthritis of   
knee (ICD-10 - M17.9)                                           
  
  
[facility]  
Other Phone: [phone]- Evaluation note*   
  
                      Encounter Date Diagnosis  Assessment Notes Treatment Notes  
 Treatment   
Clinical Notes  
   
                                        10 Aug, 2022        Osteoarthritis of   
knee (ICD-10 - M17.9)                                           
  
  
[facility]  
Other Phone: [phone]- Evaluation note*   
  
                      Encounter Date Diagnosis  Assessment Notes Treatment Notes  
 Treatment   
Clinical Notes  
   
                                        01 Aug, 2022        Osteoarthritis of   
knee (ICD-10 - M17.9)                                           
  
  
[facility]  
Other Phone: [phone]- Evaluation note*   
  
                      Encounter Date Diagnosis  Assessment Notes Treatment Notes  
 Treatment   
Clinical Notes  
   
                                                Primary   
osteoarthritis of   
left knee (ICD-10 -   
M17.12)                                                       
  
  
[facility]  
Other Phone: [phone]- Evaluation note*   
  
                      Encounter Date Diagnosis  Assessment Notes Treatment Notes  
 Treatment   
Clinical Notes  
   
                                                Primary   
osteoarthritis of   
left knee (ICD-10 -   
M17.12)                                                       
  
  
[facility]  
Other Phone: [phone]- Evaluation note*   
  
                      Encounter Date Diagnosis  Assessment Notes Treatment Notes  
 Treatment   
Clinical Notes  
   
                                                Primary   
osteoarthritis of   
right hand (ICD-10 -   
M19.041)                                            Extensive   
discussion about   
current condition   
and treatment   
options   
available.   
Patient was   
prepped and   
cortisone   
injected into the   
right long PIP   
joint under   
sterile   
conditions.   
Patient tolerated   
well with no   
adverse   
reactions.  
                                          
   
                                                Trigger finger, righ  
t   
middle finger (ICD-10   
- M65.331)                                          Based on exam, we   
will proceed with   
cortisone   
injection today   
to avoid   
development of   
obvious   
triggering.   
Patient was   
prepped and   
cortisone was   
injected into the   
right long finger   
tendon sheath   
under sterile   
conditions.   
Patient tolerated   
well with no   
adverse   
reactions.   
Activity as   
tolerated.  
                                          
   
                                                Hand pain, right   
(ICD-10 - M79.641)                                            
  
  
[facility]  
Other Phone: [phone]- Evaluation note*   
  
                      Encounter Date Diagnosis  Assessment Notes Treatment Notes  
 Treatment   
Clinical Notes  
   
                                                Primary   
osteoarthritis of   
left knee (ICD-10 -   
M17.12)                                                       
  
  
[facility]  
Other Phone: [phone]- Evaluation note*   
  
                      Encounter Date Diagnosis  Assessment Notes Treatment Notes  
 Treatment   
Clinical Notes  
   
                                                Primary   
osteoarthritis of   
left knee (ICD-10 -   
M17.12)                                                       
  
  
[facility]  
Other Phone: [phone]- Evaluation note*   
  
                      Encounter Date Diagnosis  Assessment Notes Treatment Notes  
 Treatment   
Clinical Notes  
   
                                                Primary   
osteoarthritis of   
left knee (ICD-10 -   
M17.12)                                                       
  
  
[facility]  
Other Phone: [phone]- Evaluation note*   
  
                      Encounter Date Diagnosis  Assessment Notes Treatment Notes  
 Treatment   
Clinical Notes  
   
                                                Primary   
osteoarthritis of   
left knee (ICD-10 -   
M17.12)                                                       
  
  
[facility]  
Other Phone: [phone]- Evaluation note*   
  
                                Encounter Date  Diagnosis       Assessment   
Notes                     Treatment Notes           Treatment Clinical   
Notes  
   
                                        15 Ritchie, 2022        Primary   
osteoarthritis of   
left knee (ICD-10 -   
M17.12)                                             DOS: 2021   
left TKA;   
2022 GARY   
left TKA  
Jessica presents   
today s/p left   
total knee   
arthroplasty and   
subsequent left   
knee manipulation   
under anesthesia.   
She continues to   
do poorly and not   
move her knee.   
She continues to   
complain of pain.   
Overall we have   
had constant   
issues since the   
day of her   
initial surgery   
and has had poor   
effort throughout   
the process. She   
has had out of   
proportion pain   
throughout. Her   
therapy has   
progressed very   
slowly even after   
her manipulation   
got her a   
significant   
improvement   
motion. She   
isContinuing   
therapy. Tramadol   
for pain. Fu at   
one year  
                                        Patient is   
progressing well.   
Continue physical   
therapy exercises   
and TKA precautions.   
Instructed patient   
to call with any   
questions or   
concerns. Patient   
given prescription   
for tramadol,   
prescription for   
cyclobenzaprine sent   
into patients   
pharmacy  
  
   
                                        15 Ritchie, 2022        Status post total   
left knee   
replacement (ICD-10   
- Z96.652)                                                    
   
                                        15 Ritchie, 2022        Stiffness of left   
knee (ICD-10 -   
M25.662)                                                      
  
  
[facility]  
Other Phone: [phone]- Evaluation note*   
  
                      Encounter Date Diagnosis  Assessment Notes Treatment Notes  
 Treatment   
Clinical Notes  
   
                                                Osteoarthritis of   
knee (ICD-10 - M17.9)                                           
  
  
[facility]  
Other Phone: [phone]- Evaluation note*   
  
                      Encounter Date Diagnosis  Assessment Notes Treatment Notes  
 Treatment   
Clinical Notes  
   
                                        31 May, 2022        Osteoarthritis of   
knee (ICD-10 - M17.9)                                           
  
  
[facility]  
Other Phone: [phone]- Evaluation note*   
  
                      Encounter Date Diagnosis  Assessment Notes Treatment Notes  
 Treatment   
Clinical Notes  
   
                                        27 May, 2022        Osteoarthritis of   
knee (ICD-10 - M17.9)                                           
  
  
[facility]  
Other Phone: [phone]- Evaluation note*   
  
                      Encounter Date Diagnosis  Assessment Notes Treatment Notes  
 Treatment   
Clinical Notes  
   
                                        16 May, 2022        Osteoarthritis of   
knee (ICD-10 - M17.9)                                           
  
  
[facility]  
Other Phone: [phone]- Evaluation note*   
  
                      Encounter Date Diagnosis  Assessment Notes Treatment Notes  
 Treatment   
Clinical Notes  
   
                                                Osteoarthritis of   
knee (ICD-10 - M17.9)                                           
  
  
[facility]  
Other Phone: [phone]- Evaluation note*   
  
                      Encounter Date Diagnosis  Assessment Notes Treatment Notes  
 Treatment   
Clinical Notes  
   
                                                Status post total   
left knee replacement   
(ICD-10 - Z96.652)                                            
   
                                                Primary   
osteoarthritis of   
left knee (ICD-10 -   
M17.12)                                                       
  
  
[facility]  
Other Phone: [phone]- Evaluation note*   
  
                      Encounter Date Diagnosis  Assessment Notes Treatment Notes  
 Treatment   
Clinical Notes  
   
                                                Status post total   
left knee   
replacement (ICD-10   
- Z96.652)                                                    
  
  
[facility]  
Other Phone: [phone]- Evaluation note*   
  
                                Encounter Date  Diagnosis       Assessment   
Notes                     Treatment Notes           Treatment Clinical   
Notes  
   
                                        30 Mar, 2022        Primary   
osteoarthritis of   
left knee (ICD-10 -   
M17.12)                                               
  
  
DOS: 2021   
left TKA;   
2022 GARY   
left TKA  
  
  
Jessica presents   
today s/p left   
total knee   
arthroplasty and   
subsequent left   
knee manipulation   
under anesthesia.   
She continues to   
do poorly and not   
move her knee.   
She continues to   
complain of pain.   
Overall we have   
had constant   
issues since the   
day of her   
initial surgery.   
She has had out   
of proportion   
pain throughout.   
Her therapy has   
progressed very   
slowly even after   
her manipulation   
got her a   
significant   
improvement   
motion. She is   
only had 1 visit   
of aquatic   
therapy since her   
prior appointment   
unfortunately. I   
would continue   
therapy at this   
time. Follow-up   
in 2 months.                              
  
  
   
                                        30 Mar, 2022        Status post total   
left knee   
replacement (ICD-10   
- Z96.652)                                            
  
  
Jessica presents   
today 6 weeks s/p   
left total knee   
arthroplasty.   
They are doing   
well. Physical   
exam shows   
well-healed wound   
with worse range   
of motion none   
prior. Would   
recommend   
manipulation   
under anesthesia.                         
  
  
We discussed and   
demonstrated   
motion exericses   
as well as   
quadriceps and   
hamstring   
strengthening   
exerices. Continue   
physical therapy  
   
                                        30 Mar, 2022        Stiffness of left   
knee (ICD-10 -   
M25.662)                                              
  
                                          
  
  
  
  
[facility]  
Other Phone: [phone]- Evaluation note*   
  
                      Encounter Date Diagnosis  Assessment Notes Treatment Notes  
 Treatment   
Clinical Notes  
   
                                                Primary   
osteoarthritis of   
left knee (ICD-10 -   
M17.12)                                               
  
                                          
  
  
  
  
[facility]  
Other Phone: [phone]- Evaluation note*   
  
                      Encounter Date Diagnosis  Assessment Notes Treatment Notes  
 Treatment   
Clinical Notes  
   
                                                Primary   
osteoarthritis of   
left knee (ICD-10 -   
M17.12)                                               
  
                                          
  
  
  
  
[facility]  
Other Phone: [phone]- Evaluation note*   
  
                                Encounter Date  Diagnosis       Assessment   
Notes                     Treatment Notes           Treatment Clinical   
Notes  
   
                                                Primary   
osteoarthritis of   
left knee (ICD-10 -   
M17.12)                                               
  
  
Jessica is here now   
6 weeks s/p left   
TKA. She continues   
to have issues with   
this and has not   
moving it. She now   
has significant   
stiffness in the   
knee. At this point   
I would recommend a   
manipulation under   
anesthesia. Patient   
agrees to this.  
  
  
At this juncture we   
have discussed the   
findings and   
diagnosis as well   
as reviewed   
appropriate imaging   
and performed   
interpretation of   
testing. Surgical   
intervention is   
recommended. Prior   
medical notes and   
history have been   
reviewed. Surgical   
versus   
non-operative   
management have   
been discussed in   
detail and   
non-operative   
management was   
given as an option.   
The risks of   
surgical   
intervention were   
given.   
Pre-operative   
optimization will   
be done prior to   
surgical procedure   
to limit   
kennedi-operative   
risks. I have   
discussed the   
planned procedure,   
how and who   
performs the   
procedure, and the   
personnel involved.   
Cardiovascular,   
pulmonary, and   
other life   
threatening   
episodes can occur   
during surgery   
although there is a   
low risk of these   
happening. Surgical   
risks including   
bleeding,   
neurovascular   
injury, wound   
closure problems   
and infection were   
discussed.   
Kennedi-operative   
risks including   
infection,   
bleeding, wound   
healing problems,   
and need for   
further surgery   
were discussed. It   
was discussed that   
there is a   
possibility of   
blood transfusion   
with any surgical   
procedure and the   
risks involved in   
receiving a blood   
transfusion.   
Possibility of, and   
need for, future   
bracing or DME use,   
physical or   
occupational   
therapy, mental   
therapy,   
rehabilitation,   
pain management and   
need for secondary   
procedures was   
discussed. I have   
warned against   
smoking and the use   
of tobacco products   
due to the risks   
associated with   
them, in   
particular, poor   
healing. I have   
advised against the   
long term use of   
narcotic pain   
medication. I have   
advised to follow   
all post-operative   
instructions in   
order to obtain the   
best outcome.   
Informed consent   
has been verbally   
affirmed and signed   
as indicated.  
  
  
Plan for left TKA   
manipulation under   
anesthesia, therapy   
to start day of or   
day after procedure                       
  
  
Patient is   
progressing well   
from surgery. We   
discussed the   
importance of   
continuing to work   
on range of motion   
and strength   
exercise. Patient   
infored on   
massaging the leg.   
We discussed with   
patient she needs   
to start moving   
the knee when at   
home. Continue   
working with   
therapy at this   
time. Continue   
with use of cane   
as needed.   
Continue with   
medication   
regimen.  
   
                                                Pain in left knee   
(ICD-10 - M25.562)                                    
  
                                          
  
  
   
                                                Status post total   
left knee   
replacement (ICD-10   
- Z96.652)                                            
  
  
Jessica presents   
today 6 weeks s/p   
left total knee   
arthroplasty. They   
are doing well.   
Physical exam shows   
well-healed wound   
with worse range of   
motion none prior.   
Would recommend   
manipulation under   
anesthesia.                               
  
  
   
                                                Stiffness of left   
knee (ICD-10 -   
M25.662)                                              
  
                                          
  
  
All treatment   
options as well as   
risks of the   
proposed procedure   
were thoroughly   
discussed with the   
patient including   
bleeding, nerve   
and vessel injury,   
infection,   
persistent pain,   
stiffness, failure   
of procedure,   
mechanical   
failure, need for   
further surgery,   
DVT, as well as   
risks of   
anesthesia.   
Patient has   
admitted full   
understanding of   
these risks and   
would like to   
proceed with   
manipulation under   
anesthesia,   
patient voices   
understanding and   
agrees  
   
                                                Pre-op examination   
(ICD-10 - Z01.818)                                    
  
                                          
  
  
  
  
[facility]  
Other Phone: [phone]- Evaluation note*   
  
                      Encounter Date Diagnosis  Assessment Notes Treatment Notes  
 Treatment   
Clinical Notes  
   
                                        28 Dec, 2021        Primary   
osteoarthritis of   
left knee (ICD-10 -   
M17.12)                                               
  
                                          
  
  
  
  
[facility]  
Other Phone: [phone]- Evaluation note*   
  
                                Encounter Date  Diagnosis       Assessment   
Notes                     Treatment Notes           Treatment   
Clinical Notes  
   
                                        27 Dec, 2021        Pain in left knee   
(ICD-10 - M25.562)                                    
  
                                          
  
  
   
                                        27 Dec, 2021        Primary   
osteoarthritis of   
left knee (ICD-10 -   
M17.12)                                               
  
  
S/p TKA left                              
  
  
Patient is   
progressing well   
from surgery. We   
discussed the   
importance of   
continuing to   
work on range of   
motion and   
strength   
exercise.   
Patient infored   
on massaging the   
leg. We   
discussed with   
patient she   
needs to start   
moving the knee   
when at home.   
Continue working   
with therapy at   
this time.   
Continue with   
use of cane as   
needed. Continue   
with medication   
regimen.  
   
                                        27 Dec, 2021        Status post total   
left knee   
replacement (ICD-10   
- Z96.652)                                            
  
  
Jessica presents   
today 2 weeks s/p   
left total knee   
arthroplasty. They   
are doing well.   
Physical exam is   
benign with a   
healthy appearing   
wound and range of   
motion of 5-90.   
They are still   
taking pain   
medication but we   
did discuss the   
weaning process   
today. They are   
anticoagulated   
appropriately   
without any signs   
of deep vein   
thrombosis. We have   
given an order for   
outpatient therapy   
today. I will see   
them back at the   
6-week anniversary   
from their surgery   
for reevaluation.   
No x-rays are   
needed at that   
time.                                     
  
  
  
  
[facility]  
Other Phone: [phone]- Evaluation note*   
  
                      Encounter Date Diagnosis  Assessment Notes Treatment Notes  
 Treatment   
Clinical Notes  
   
                                        08 Dec, 2021        Pain in left knee   
(ICD-10 - M25.562)                                    
  
                                          
  
  
   
                                        08 Dec, 2021        Primary   
osteoarthritis of   
left knee (ICD-10 -   
M17.12)                                               
  
  
Jessica presents   
with end-stage left   
knee DJD. She is   
exhausted all   
conservative   
treatment options.   
At this juncture we   
have discussed the   
findings and   
diagnosis as well as   
personally reviewed   
appropriate imaging   
and performed   
interpretation of   
related testing and   
examination with the   
patient in office   
today. Prior medical   
notes from Dr. Mayorga and history   
have been reviewed.   
At this time I would   
recommend total knee   
arthroplasty. She   
has undergone   
conservative   
treatments including   
medications as well   
as injections with   
no relief. This   
limits her ability   
to perform simple   
activities of daily   
living and even   
walking. Ready to   
move forward with   
TKA  
  
  
At this juncture we   
have discussed the   
findings and   
diagnosis. Surgical   
intervention is   
recommended.   
Surgical versus   
non-operative   
management have been   
discussed in detail   
and non-operative   
management was given   
as an option and   
exhausted. The risks   
of surgical   
intervention were   
given. Pre-operative   
optimization will be   
done prior to   
surgical procedure   
to limit   
kennedi-operative   
risks. I have   
discussed the   
planned procedure,   
how and who performs   
the procedure, and   
the personnel   
involved.   
Cardiovascular,   
pulmonary, and other   
life-threatening   
episodes can occur   
during surgery   
although there is a   
low risk of these   
happening. Surgical   
risks including   
bleeding,   
neurovascular   
injury, wound   
closure problems and   
infection were   
discussed.   
Kennedi-operative risks   
including infection,   
bleeding, wound   
healing problems,   
and need for further   
surgery were   
discussed. It was   
discussed that there   
is a possibility of   
blood transfusion   
with any surgical   
procedure and the   
risks involved in   
receiving a blood   
transfusion.   
Possibility of, and   
need for, future   
bracing or DME use,   
physical or   
occupational   
therapy, mental   
therapy,   
rehabilitation, pain   
management and need   
for secondary   
procedures was   
discussed. There is   
possibility of   
component   
malfunction or wear   
and tear requiring   
future procedures. I   
have warned against   
smoking and the use   
of tobacco products   
due to the risks   
associated with   
them, in particular,   
poor healing.   
Obtaining or   
maintaining a   
healthy BMI was   
discussed. I have   
advised against the   
long term use of   
narcotic pain   
medication. I have   
advised to follow   
all post-operative   
instructions in   
order to obtain the   
best outcome.   
Informed consent has   
been verbally   
affirmed and signed   
as indicated.  
  
  
Today we discussed   
the process of the   
patient's left total   
knee arthroplasty   
including   
preoperative plan,   
the procedure itself   
and personnel   
involved, and   
hospitalization   
versus outpatient   
care. Patient will   
have home health set   
up and we will try   
for an outpatient   
management of this   
total knee   
arthroplasty.   
Patient does take   
Eliquis which we   
will resume   
postoperatively for   
anticoagulation.  
  
  
The patient has been   
involved in our   
cooperative   
treatment plan and   
agrees to move   
forward with   
treatment at this   
time.                                     
  
  
We will   
proceed with   
Left Total   
Knee   
Arthroplasty.   
Patient would   
benifit from   
[facility].  
  
  
[facility]  
Other Phone: [phone]- Evaluation note*   
  
                      Encounter Date Diagnosis  Assessment Notes Treatment Notes  
 Treatment   
Clinical Notes  
   
                                        15 Nov, 2021        Primary   
osteoarthritis of   
left knee (ICD-10 -   
M17.12)                                               
  
                                          
  
  
  
  
[facility]  
Other Phone: [phone]- Evaluation note*   
  
                                Encounter Date  Diagnosis       Assessment   
Notes                     Treatment Notes           Treatment   
Clinical Notes  
   
                                                Pain in left knee   
(ICD-10 - M25.562)                                    
  
                                          
  
  
   
                                                Primary   
osteoarthritis of   
left knee (ICD-10 -   
M17.12)                                               
  
  
Jessica presents   
with end-stage left   
knee DJD. She is   
exhausted all   
conservative   
treatment options.   
At this juncture we   
have discussed the   
findings and   
diagnosis as well   
as personally   
reviewed   
appropriate imaging   
and performed   
interpretation of   
related testing and   
examination with   
the patient in   
office today. Prior   
medical notes from   
Dr. Mayorga and   
history have been   
reviewed. At this   
time I would   
recommend total   
knee arthroplasty.   
She has undergone   
conservative   
treatments   
including   
medications as well   
as injections with   
no relief. This   
limits her ability   
to perform simple   
activities of daily   
living and even   
walking. She will   
need preoperative   
clearance which we   
will work on. We   
will plan for   
follow-up preop   
H&P.  
  
  
The patient has   
been involved in   
our cooperative   
treatment plan and   
agrees to move   
forward with   
treatment at this   
time.                                     
  
  
We will proceed   
with Left Total   
Knee Arthroplasty  
  
  
[facility]  
Other Phone: [phone]- NoteHNO ID: [id number]  
Author: Shubham El MD  
Service: ?  
Author Type: Physician  
Type: Progress Notes  
Filed: 2021 11:54 AM  
Note Text:  
Treated for glaucoma since age 30s  
Tmax unknown, unknown Pachy 518, 517  
+Family Hx: mother, blind in 50s  
Past Ocular Surgical/Laser History  
OD: CEIOL ~  
OS: CEIOL ~  
Medication Intolerance/Inefficacy/Barriers  
-  
Current Medications  
Xalatan (using >30 years)  
POAG OU  
HVF 2021  
OD: IAD, first field  
OS: non-specific, full  
OCT RNFL 2021  
OD superior, inferior thinning, first  
OS superior, inferior thinning, first  
Treated many years with Latan; unclear rate of progression and not  
previously followed with regular testing; patient to request records  
Follow up 4-5 months, [facility] afternoon appointment  
Intraocular lens both eyes  
- Doing well  
I, Shubham El MD, have confirmed and edited as necessary the  
relevant ophthalmic history, ROS, and the neuro exam findings as obtained  
by others. I have seen and examined Jessica Quick.  
I have discussed the case and the management of this patient's care with  
the Resident/Fellow, if applicable. I also have reviewed and agree with  
the assessment and plan as stated above and agree with all of its relevant  
components.  
Shubham El MD  
[facility]- History general Narrative - 
Reported*   
  
                                Type            Description     Date  
   
                                        Medical History     Bloodwork 12; u  
rinalysis, lipid panel, cmp,   
cbc, Vitamin d 25 hydroxy                 
   
                                Medical History L-spine XR [facility] [place]    
   
                                Medical History Not sure on stress test >23 year  
s   
   
                                Medical History 11-16-10 Refuses mammogram   
   
                                Medical History 12-21-10 Refused colonoscopy and  
 rectal exam   
   
                                Medical History 12-21-10 Refuses mammogram   
   
                                Medical History MRI left knee 12-22-10 [facility] [place]   
   
                                Medical History XR right shoulder 7-15-11 [facility] [place]   
   
                                        Medical History     CT Scan Brain [facility] [place]  (no acute intracranial   
process)                                  
   
                                Medical History 11 Refuses colonoscopy and  
 mammogram   
   
                                Medical History 12 Refuses colonoscopy, suze  
mogram, PAP/Pelvic   
   
                                        Medical History     Bloodwork 12;   
cmp, lipid panel with reflex,   
vitamin d                                 
   
                                Medical History 12 refuses colonoscopy, ma  
mmogram, PAP   
   
                                        Medical History     Bloodwork 13; c  
mp, lipid panel with reflex,   
cbc                                       
   
                                Medical History DEXA scan 11   
   
                                Medical History Refuses Colonoscopy, PAP/Mammogr  
am 13   
   
                                        Medical History     Bloodwork 13;   
LSCAN, , cmp, cbc, lipid panel   
with reflex, tsh,                         
   
                                Medical History Refuses Colonoscopy 6-26-15   
   
                                Medical History heart attack 08/10/2015   
   
                                Medical History Refuses Colonoscopy 3-10-16   
   
                                Surgical History Left Knee Surgery 20 years ago?  
 Dr. Epstein   
   
                                Surgical History stint (heart)   08/10/2015  
   
                                Surgical History colonoscopy-normal but redundan  
t colon-Dr. Cutler 6/3/2016  
   
                                Surgical History CXR ER [facility] [place]     18  
   
                                Surgical History Holter Monitor - Dr Norris   
   
                                Surgical History bone marrow     3/14/2018  
   
                                Surgical History Dr Fischer inj middle rt hand   
   
                                Surgical History Heart Cath      2019  
   
                                Surgical History left TKA        2021  
   
                                Surgical History left TKA with manipulation 2022  
   
                                Hospitalization History chilbirth x2      
   
                                Hospitalization History kidney stones     
  
  
[facility]  
Other Phone: [phone]- History general Narrative - Reported*   
  
                                Type            Description     Date  
   
                                        Medical History     Bloodwork 12; u  
rinalysis, lipid panel, cmp,   
cbc, Vitamin d 25 hydroxy                 
   
                                Medical History L-spine XR [facility] [place]    
   
                                Medical History Not sure on stress test >23 year  
s   
   
                                Medical History 11-16-10 Refuses mammogram   
   
                                Medical History 12-21-10 Refused colonoscopy and  
 rectal exam   
   
                                Medical History 12-21-10 Refuses mammogram   
   
                                Medical History MRI left knee 12-22-10 [facility] [place]   
   
                                Medical History XR right shoulder 7-15-11 [facility] [place]   
   
                                        Medical History     CT Scan Brain [facility] [place]  (no acute intracranial   
process)                                  
   
                                Medical History 11 Refuses colonoscopy and  
 mammogram   
   
                                Medical History 12 Refuses colonoscopy, suze  
mogram, PAP/Pelvic   
   
                                        Medical History     Bloodwork 12;   
cmp, lipid panel with reflex,   
vitamin d                                 
   
                                Medical History 12 refuses colonoscopy, ma  
mmogram, PAP   
   
                                        Medical History     Bloodwork 13; c  
mp, lipid panel with reflex,   
cbc                                       
   
                                Medical History DEXA scan 11   
   
                                Medical History Refuses Colonoscopy, PAP/Mammogr  
am 13   
   
                                        Medical History     Bloodwork 13;   
LSCAN, , cmp, cbc, lipid panel   
with reflex, tsh,                         
   
                                Medical History Refuses Colonoscopy 6-26-15   
   
                                Medical History heart attack 08/10/2015   
   
                                Medical History Refuses Colonoscopy 3-10-16   
   
                                Surgical History Left Knee Surgery 20 years ago?  
 Dr. Epstein   
   
                                Surgical History stint (heart)   08/10/2015  
   
                                Surgical History colonoscopy-normal but redundan  
t colon-Dr. Cutler 6/3/2016  
   
                                Surgical History CXR ER [facility] [place]     18  
   
                                Surgical History Holter Monitor - Dr Norris 2  
  
   
                                Surgical History bone marrow     3/14/2018  
   
                                Surgical History Dr Fischer inj middle rt hand   
   
                                Surgical History Heart Cath      2019  
   
                                Hospitalization History chilbirth x2      
   
                                Hospitalization History kidney stones     
  
  
[facility]  
Other Phone: [phone] noteNo InformationNort Coast Professional 
[facility]poration  
Other Phone: [phone] noteNort Coast Professional Corporation  
Other Phone: [phone] noteNo assessment information available
[facility]  
Work Phone: [phone] note*   
  
                                                    Diagnosis  
   
                                                      
  
  
Atherosclerosis of native coronary artery of native heart without angina 
pectoris  
   
                                                      
  
  
Chronic atrial fibrillation (CMS/HCC)  
  
  
Atrial fibrillation  
   
                                                      
  
  
Essential hypertension, benign  
   
                                                      
  
  
SANDY (dyspnea on exertion)  
  
  
Other dyspnea and respiratory abnormality  
   
                                                      
  
  
History of PTCA  
  
  
Postsurgical percutaneous transluminal coronary angioplasty status  
   
                                                      
  
  
High risk medication use  
   
                                                      
  
  
Hyperlipidemia, unspecified hyperlipidemia type  
  
documented in this encounter  
[facility]  
Work Phone: [phone] note*   
  
                          Diagnosis    Onset Date   Resolution   Status  
   
                          Elevated troponin                           acute  
   
                          Hypertensive urgency                           acute  
   
                          Pre-syncope                            acute  
  
  
[facility]  
Work Phone: [phone] general Narrative - ReportedNorth Coast 
Professional Corporation  
Other Phone: [phone] of Present illness Narrative* The patient 
  presents with permanent atrial fibrillation. The treatment strategy for this 
  patient israte control. She states her atrial fibrillation has been well 
  controlled since the last visit.  
* Symptoms: denies palpitations, denies chest pain, denies exercise intolerance,
  denies dyspnea on exertion and denies dizziness. Associated symptoms include 
  no syncope.  
* Risks: no increased risk for falling.  
* Medications: the patient is adherent with her medication regimen. She denies 
  medication side effects.  
-[facility] DO  
Work Phone: [phone] for referral (narrative)* Consultation 
  (Routine) - Authorized  
  
                          Specialty    Diagnoses / Procedures Referred By Contac  
t Referred To Contact  
   
                                        Cardiology            
  
  
Diagnoses  
  
  
Essential hypertension,   
benign  
  
  
  
Procedures  
  
  
Follow Up In Cardiology                   
  
  
Tayo Norris DO  
  
  
[address] [facility] [address], [address]  
  
  
[place] [address]  
  
  
Phone: [phone]  
  
  
Fax: [phone]                         
  
  
  
  
  
                    Referral ID Status    Reason    Start Date Expiration Date V  
isits   
Requested                               Visits   
Authorized  
   
                [id number]  Authorized         10/10/2023 2024 1       1  
  
  
  
  
Electronically signed by Tayo Norris DO at 10/10/2023 11:10 AM EDT  
  
  
* Consultation (Routine) - Authorized  
  
                          Specialty    Diagnoses / Procedures Referred By Contac  
t Referred To Contact  
   
                                        Cardiology            
  
  
Diagnoses  
  
  
Essential hypertension,   
benign  
  
  
  
Procedures  
  
  
Follow Up In Cardiology                   
  
  
Tayo Norris DO  
  
  
[address] [facility] [address], [address] [address]  
  
  
Phone: [phone]  
  
  
Fax: [phone]                         
  
  
  
  
  
                    Referral ID Status    Reason    Start Date Expiration Date V  
isits   
Requested                               Visits   
Authorized  
   
                [id number]  Authorized         10/10/2023 2024 1       1  
  
  
  
  
Electronically signed by Tayo Norris DO at 10/10/2023 11:10 AM EDT  
  
  
[facility]  
Work Phone: [phone]  
  
Summary Purpose  
  
  
                                                      
  
  
  
Family History  
                              Unknown Family Member  
  
                                Name            Dates           Details  
   
                                                    Family history of myocardial  
 infarction: Mother, Father(V17.3,   
Z82.49)  
                                                    Status:Active  
  
                              Unknown Family Member  
  
                                Name            Dates           Details  
   
                                                    Family history of myocardial  
 infarction: Mother, Father(V17.3,   
Z82.49)  
                                                    Status:Active  
  
                              Unknown Family Member  
  
                                Name            Dates           Details  
   
                                                    Family history of myocardial  
 infarction: Mother, Father(V17.3,   
Z82.49)  
                                                    Status:Active  
  
                              Unknown Family Member  
  
                                Name            Dates           Details  
   
                                                    Family history of myocardial  
 infarction: Mother, Father(V17.3,   
Z82.49)  
                                                    Status:Active  
  
  
  
                          Relationship Condition    Age at Onset Recorded Date/T  
blanca  
   
                          Not Specified Myocardial infarction Unknown        
   
                          father       Chronic obstructive pulmonary disease Unk  
nown        
   
                          brother      Malignant neoplasm of lung Unknown        
   
                          natural son  Thalassemia  Unknown        
  
                              Unknown Family Member  
  
                                Name            Dates           Details  
   
                                                    Family history of myocardial  
 infarction: Mother, Father(V17.3,   
Z82.49)  
                                                    Status:Active  
  
                              Unknown Family Member  
  
                                Name            Dates           Details  
   
                                                    Family history of myocardial  
 infarction: Mother, Father(V17.3,   
Z82.49)  
                                                    Status:Active  
  
                              Unknown Family Member  
  
                                Name            Dates           Details  
   
                                                    Family history of myocardial  
 infarction: Mother, Father(V17.3,   
Z82.49)  
                                                    Status:Active  
  
                              Unknown Family Member  
  
                                Name            Dates           Details  
   
                                                    Family history of myocardial  
 infarction: Mother, Father(V17.3,   
Z82.49)  
                                                    Status:Active  
  
                              Unknown Family Member  
  
                                Name            Dates           Details  
   
                                                    Family history of myocardial  
 infarction: Mother, Father(V17.3,   
Z82.49)  
                                                    Status:Active  
  
                              Unknown Family Member  
  
                                Name            Dates           Details  
   
                                                    Family history of myocardial  
 infarction: Mother, Father(V17.3,   
Z82.49)  
                                                    Status:Active  
  
                              Unknown Family Member  
  
                                Name            Dates           Details  
   
                                                    Family history of myocardial  
 infarction: Mother, Father(V17.3,   
Z82.49)  
                                                    Status:Active  
  
                              Unknown Family Member  
  
                                Name            Dates           Details  
   
                                                    Family history of myocardial  
 infarction: Mother, Father(V17.3,   
Z82.49)  
                                                    Status:Active  
  
                              Unknown Family Member  
  
                                Name            Dates           Details  
   
                                                    Family history of myocardial  
 infarction: Mother, Father(V17.3,   
Z82.49)  
                                                    Status:Active  
  
  
  
Advance Directives  
  
  
                                Advance Directive Response        Recorded Date/  
Time  
   
                                Advance Directives No              2018 1:56pm  
  
  
  
                                Advance Directive Response        Recorded Date/  
Time  
   
                                Advance Directives No              2018 12:56pm  
  
  
  
Chief Complaint  
* JESSICA QUICK is being seen for an annual follow-up of.  
* Patient is a 78-year-old female who returns for follow-up following recent 
  chest pain/anginal eventwith associated palpitations and was hospitalized 
  underwent catheterization, revealing widely patent RCA stents and otherwise 
  normal coronary arteries. He has a history of remote inferior MI with PCIof 
  the RCA in . She has underlying paroxysmal A. fib currently on Eliquis. 
  She has had no bleeding or thromboembolic events. She had no acute coronary 
  syndrome this past hospitalization in September.  
* She is otherwise quite stable, asymptomatic. She can follow-up in 1 year  
* JESSICA QUICK is being seen for an annual follow-up of.  
* 79-year-old female who returns for follow-up and she is doing well she denies 
  any cardiovascular complaints or shortness of breath or angina recurrent 
  nitrate usage or hospitalizations. She has a known history of previous 
  inferior MI with revascularization of the RCA in . In  she underwent r
  epeat catheterization for angina pectoris that revealed normal coronary 
  arteries and widely patent RCA stent and normal left ventricular function. She
  developed paroxysmal atrial fibrillation afterwards, she has been on Eliquis 
  and metoprolol.  
* Today she presents with an irregularly irregular rhythm and confirmed with 
  atrial fibrillation withgood rate control.  
* She is also hypertensive today on current therapies  
* Recommendations: Discontinue losartan initiate valsartan 160, initiate 
  amiodarone 400 twice daily for 7 days then 400 daily with follow-up ECG in 4 
  weeks with possible cardioversion thereafterwards. Risk benefits alternatives 
  and informed decision-making process discussed with patient extensively in 
  regards to amiodarone therapy, routine laboratory and chest x-ray and 
  pulmonary function test surveillance, and consideration for possible atrial 
  ablation in the future if this does not work.  
* Medication changes: 'doing fine'  
* JESSICA QUICK is being seen for a 4 week follow-up of atrial fibrillation and 
  hypertension.  
* Patient presents to the office today ambulatory with steady gait.  
* Last evaluated Dr. Norris 2022. At that time antihypertensives were 
  changed to valsartan and she was initiated on amiodarone 400 mg twice daily. 
  She was unable to tolerate that dosing and has been taking 200 mg daily. I 
  believe plans were for cardioversion to attempt restoration of normal sinus 
  rhythm.  
* On record review, in 2018 patient was tried on amiodarone but was discontinued
  due to GI distress. At that time treatment strategy was changed to rate 
  control and she had been maintained chronic atrial fibrillation with 
  controlled ventricular rate on metoprolol. In atrial fibrillation, she denies 
  palpitations, no dyspnea on exertion, no change in exercise capacity or 
  functional tolerance. Today we discussed purpose of amiodarone and eventual 
  need for cardioversion. She is very reluctant to proceed. In light of 
  intolerance to amiodarone, reluctance to proceed with cardioversion and 
  asymptomatic atrial fibrillation with optimal rate control feel in her best 
  interest to continue with treatmentstrategy of rate control and 
  anticoagulation. Patient is in agreement.  
* Daily activity includes ADLs, housework, she is able to go to the grocery 
  store. She ambulated in from the parking lot without any symptoms. There have 
  been no utilization of nitroglycerin.  
* 2019 LVEF 60 to 65%, mild LVH, left atrium severely dilated. Currently does 
  not exhibit any symptoms concerning for decompensated heart failure.  
* Otherwise, blood pressure remains suboptimal here in the office. She did not 
  take her blood pressure medications prior to coming into the office today.  
* Otherwise, she denies any change in overall cardiovascular status since last 
  evaluation with Dr. Norris.  
  
  
Chief Complaint and Reason for Visit  
  
  
                                        Chief Complaint     M17.12  
MEDICARE/left TKA  
throwing up,cough,N/V  
  
  
  
                                        Chief Complaint     M17.12  
  
  
  
                                        Chief Complaint     Dizzy  
  
  
  
                                        Chief Complaint     dizzy  
   
                                        Reason for Visit    Elevated troponin  
Hypertensive urgency  
Pre-syncope  
  
  
  
Additional Source Comments  
  
  
  
                                                    INFORMATION SOURCE (unrecogn  
ized section and content)  
   
                                          
  
  
  
                                        DATE CREATED        AUTHOR  
   
                                2019                      [facility]  
  
  
  
                                DATE CREATED    AUTHOR          AUTHOR'S ORGANIZ  
ATION  
   
                                2019                      [facility]  
  
  
  
                                DATE CREATED    AUTHOR          AUTHOR'S ORGANIZ  
ATION  
   
                                2022                      [facility]  
  
  
  
                                DATE CREATED    AUTHOR          AUTHOR'S ORGANIZ  
ATION  
   
                                2022                       [facility]  
  
  
  
                                DATE CREATED    AUTHOR          AUTHOR'S ORGANIZ  
ATION  
   
                                2022                      [facility]  
  
  
  
                                DATE CREATED    AUTHOR          AUTHOR'S ORGANIZ  
ATION  
   
                                2023                      [facility]  
  
  
  
                                DATE CREATED    AUTHOR          AUTHOR'S ORGANIZ  
ATION  
   
                                2024                      [facility]  
  
  
  
                                DATE CREATED    AUTHOR          AUTHOR'S ORGANIZ  
ATION  
   
                                2024                      [facility]  
  
  
  
  
  
                                                    REASON FOR VISIT (unrecogniz  
ed section and content)  
   
                                                    rx refill  
  
  
  
                                        Reason              Comments  
   
                                        Follow-up           1 year  
  
  
  
  
                                                    Care Teams (unrecognized sec  
tion and content)  
   
                                          
  
  
  
                                                    Team Status: Inactive   
   
                          Member       Role         Status       Dates  
   
                          Claudio Brito , DO Primary Care Provider Active         
   
                          Andrew Freeman , DO Attending Provider Active         
  
  
  
                                                    Team Status: Inactive   
   
                          Member       Role         Status       Dates  
   
                          Claudio Brito , DO Primary Care Provider Active         
   
                          Bart Guzmán , DO Emergency Provider Active         
  
  
  
                                                    Team Status: Active   
   
                          Member       Role         Status       Dates  
   
                          Claudio Brito , DO Primary Care Provider Active         
  
  
  
                                                    Team Status: Inactive   
   
                          Member       Role         Status       Dates  
   
                          Claudio Brito , DO Primary Care Provider Active         
   
                          Morteza Nguyễn , DO Emergency Provider Active         
  
  
  
                                                    Team Status: Inactive   
   
                          Member       Role         Status       Dates  
   
                          STEVE Velasquez Emergency Provider Active      
     
   
                          Claudio Brito , DO Primary Care Provider Active         
   
                          Hermann Roque , DO Admit Provider, Attending Provid  
er Active         
  
  
  
  
  
                                                    Goals (unrecognized section   
and content)  
   
                                                    Goals may be documented in a  
n alternate section  
  
FOR RECORDS PERTAINING TO PATIENTS WHO ARE OR HAVE BEEN ENROLLED IN A CHEMICAL 
DEPENDENCY/SUBSTANCEABUSE PROGRAM, SOME INFORMATION MAY BE OMITTED. This 
clinical summary was aggregated from multiple sources. Caution should be 
exercised in using it in the provision of clinical care. This summary normalizes
information from multiple sources, and as a consequence, information in this 
document may materially change the coding, format and clinical context of 
patient data. In addition, data may be omitted in some cases. CLINICAL DECISIONS
SHOULD BE BASED ON THE PRIMARY CLINICAL RECORDS. [employer] provides 
no warranty or guarantee of the accuracy or completeness of information in this 
document. Her/She

## 2024-02-20 ENCOUNTER — OUTPATIENT (OUTPATIENT)
Dept: OUTPATIENT SERVICES | Facility: HOSPITAL | Age: 74
LOS: 1 days | End: 2024-02-20
Payer: MEDICARE

## 2024-02-20 ENCOUNTER — APPOINTMENT (OUTPATIENT)
Dept: ORTHOPEDIC SURGERY | Facility: CLINIC | Age: 74
End: 2024-02-20
Payer: MEDICARE

## 2024-02-20 ENCOUNTER — RESULT REVIEW (OUTPATIENT)
Age: 74
End: 2024-02-20

## 2024-02-20 VITALS
WEIGHT: 125 LBS | HEIGHT: 61 IN | OXYGEN SATURATION: 100 % | HEART RATE: 76 BPM | DIASTOLIC BLOOD PRESSURE: 75 MMHG | SYSTOLIC BLOOD PRESSURE: 138 MMHG | BODY MASS INDEX: 23.6 KG/M2

## 2024-02-20 PROCEDURE — 73562 X-RAY EXAM OF KNEE 3: CPT | Mod: 26,LT

## 2024-02-20 PROCEDURE — 73562 X-RAY EXAM OF KNEE 3: CPT

## 2024-02-20 PROCEDURE — 99024 POSTOP FOLLOW-UP VISIT: CPT

## 2024-02-22 NOTE — HISTORY OF PRESENT ILLNESS
[Xray (Date:___)] : [unfilled] Xray -  [de-identified] : 73-year-old Cantonese-speaking woman, presenting around about six weeks status post, left total knee arthroplasty, overall doing very well. She's been participating in home physical therapy about two times per week. And today she also does her own exercise program on a daily basis, denies any having any pain. Since she's been walking often, she's been using the cane as needed.  [de-identified] : second post op visit for left knee joint.  [de-identified] : Gait: mild left caution and antalgia with cane.  Left knee:  - Focal soft tissue swelling: none - Ecchymosis: none - Erythema: none - Effusion: mild - Wounds: well-healed anterior surgical incision, benign appearing  - Alignment: normal - Tenderness: some tenderness along the medial joint line  - ROM: improved to 0 to 125 - Collateral laxity: none - Cruciate laxity: none - Quad strength: 5/5 [de-identified] : Imaging findings today, left knee x-rays were repeated today, February 20th, 2024, at [facility]. These demonstrate stable appearance of her left total knee arthroplasty as compared to previous imaging without evidence of mechanical complication, patella sits at normal height and tracks centrally with mildly increased lateral tilt. [de-identified] : 73-year-old female now about six weeks status post, left total knee arthroplasty, overall doing very well.  [de-identified] : Cont PT/HEP Wean cane We will have patient follow up again in another two months with repeat left knee x-rays.

## 2024-02-22 NOTE — ADDENDUM
[FreeTextEntry1] :   Documented by Gracie Galindo acting as a scribe under Dr. Sanderson. 02/20/2024

## 2024-02-22 NOTE — END OF VISIT
[FreeTextEntry3] :  Documented by Gracie Galindo acting as a scribe for Dr. Gurjit Sanderson. 02/20/2024   All medical record entries made by the Scribe were at my, Dr. Gurjit Sanderson, direction and personally dictated by me on 02/20/2024. I have reviewed the chart and agree that the record accurately reflects my personal performance of the history, physical exam, assessment and plan. I have also personally directed, reviewed, and agreed with the chart.

## 2024-03-12 ENCOUNTER — NON-APPOINTMENT (OUTPATIENT)
Age: 74
End: 2024-03-12

## 2024-03-12 DIAGNOSIS — Z86.69 PERSONAL HISTORY OF OTHER DISEASES OF THE NERVOUS SYSTEM AND SENSE ORGANS: ICD-10-CM

## 2024-03-12 DIAGNOSIS — Z85.41 PERSONAL HISTORY OF MALIGNANT NEOPLASM OF CERVIX UTERI: ICD-10-CM

## 2024-03-20 PROBLEM — R91.1 LUNG NODULE: Status: ACTIVE | Noted: 2024-03-12

## 2024-03-22 ENCOUNTER — APPOINTMENT (OUTPATIENT)
Dept: THORACIC SURGERY | Facility: CLINIC | Age: 74
End: 2024-03-22
Payer: MEDICARE

## 2024-03-22 VITALS
BODY MASS INDEX: 24.35 KG/M2 | HEART RATE: 75 BPM | TEMPERATURE: 97.1 F | DIASTOLIC BLOOD PRESSURE: 66 MMHG | HEIGHT: 61 IN | SYSTOLIC BLOOD PRESSURE: 133 MMHG | WEIGHT: 129 LBS | OXYGEN SATURATION: 95 %

## 2024-03-22 DIAGNOSIS — R91.1 SOLITARY PULMONARY NODULE: ICD-10-CM

## 2024-03-22 PROCEDURE — 99203 OFFICE O/P NEW LOW 30 MIN: CPT

## 2024-03-28 NOTE — CONSULT LETTER
[FreeTextEntry3] : Ryan Dey MD Professor, Cardiovascular & Thoracic Surgery [facility] [employer] Director of the [facility]  Director of Thoracic Surgery, [address] [place] [address] Phone: [phone] Fax: [phone]

## 2024-03-28 NOTE — ASSESSMENT
[FreeTextEntry1] : MELANIE VILLAFANA is a 73-year-old F, never smoker, with PMHx of cervical cancer, glaucoma, who is referred by Dr. HARSHA HOWELL for an initial evaluation of pulmonary nodule. Patient was found to have LLL 1.3 cm nodule in 10/2014, which didn't show FDG uptake in PET scan in 2014. The repeated CT scan in 2018, 2019, and 2021 showed 1.7 cm nodule, slightly increased in size since 2014. She presents today with an updated ct scan to better asses the nodule.    I have independently reviewed the medical records and imaging at the time of this office consultation, and discussed the following interpretations with the patient:  CT chest shows a 1.4 cm x 1.3 left lower lobe nodule, unchanged from 2014. These results were discussed with the patient. Due to the stability of the nodule over 10 years there is no need for any additional surveillance scans at this time, especially considering that she was a never smoker. Patient advised to follow up as needed.   Plan: -RTC PRN    I, Dr. Ryan Dey MD, personally performed the evaluation and management (E/M) services for this established patient who presents today with (a) new problem(s)/exacerbation of (an) existing condition(s).  That E/M includes conducting the clinically appropriate interval history &/or exam, assessing all new/exacerbated conditions, and establishing a new plan of care.  Today, my RAMON, Zulma Beach NP, was here to observe &/or participate in the visit & follow plan of care established by me.

## 2024-03-28 NOTE — SOCIAL HISTORY
[TextEntry] : COVID history   TB history   Denies major psychiatric history.   Occupation:   Patient lives with family/alone, with home aid

## 2024-03-28 NOTE — REASON FOR VISIT
[[employer] ] : provided by [employer]   [Time Spent: ____ minutes] : Total time spent using  services: [unfilled] minutes. The patient's primary language is not English thus required  services. [Interpreters_IDNumber] : [id number] [Interpreters_FullName] : Reji

## 2024-03-28 NOTE — HISTORY OF PRESENT ILLNESS
[FreeTextEntry1] : MELANIE VILLAFANA is a 73-year-old F, never smoker, with PMHx of cervical cancer, glaucoma, who is referred by Dr. HARSHA HOWELL for an initial evaluation of pulmonary nodule. Patient was found to have LLL 1.3 cm nodule in 10/2014, which didn't show FDG uptake in PET scan in 2014. The repeated CT scan in 2018, 2019, and 2021 showed 1.7 cm nodule, slightly increased in size since 2014. She presents today with an updated ct scan to better asses the nodule.   Results are as follows;  CT chest on 03/07/2024: -1.4 cm x 1.3 left lower lobe nodule, unchanged from 2014.  -Persistently ectatic ascending aorta to 4.2 cm.  -Cholelithiasis. Gallbladder adenomyomatosis.   Patient denies cough, hemoptysis, shortness of breath, weight change, nausea, vomiting, diarrhea, chest pain, fever, or any recent illnesses or hospitalizations

## 2024-03-28 NOTE — PHYSICAL EXAM
[General Appearance - Alert] : alert [General Appearance - In No Acute Distress] : in no acute distress [Sclera] : the sclera and conjunctiva were normal [PERRL With Normal Accommodation] : pupils were equal in size, round, and reactive to light [Extraocular Movements] : extraocular movements were intact [Outer Ear] : the ears and nose were normal in appearance [Neck Appearance] : the appearance of the neck was normal [Oropharynx] : the oropharynx was normal [Neck Cervical Mass (___cm)] : no neck mass was observed [Jugular Venous Distention Increased] : there was no jugular-venous distention [Thyroid Diffuse Enlargement] : the thyroid was not enlarged [Thyroid Nodule] : there were no palpable thyroid nodules [Auscultation Breath Sounds / Voice Sounds] : lungs were clear to auscultation bilaterally [Heart Rate And Rhythm] : heart rate was normal and rhythm regular [Heart Sounds] : normal S1 and S2 [Heart Sounds Gallop] : no gallops [Murmurs] : no murmurs [Examination Of The Chest] : the chest was normal in appearance [Heart Sounds Pericardial Friction Rub] : no pericardial rub [Chest Visual Inspection Thoracic Asymmetry] : no chest asymmetry [Bowel Sounds] : normal bowel sounds [Diminished Respiratory Excursion] : normal chest expansion [Abdomen Soft] : soft [Abdomen Tenderness] : non-tender [Abdomen Mass (___ Cm)] : no abdominal mass palpated [Cervical Lymph Nodes Enlarged Posterior Bilaterally] : posterior cervical [Cervical Lymph Nodes Enlarged Anterior Bilaterally] : anterior cervical [Supraclavicular Lymph Nodes Enlarged Bilaterally] : supraclavicular [Axillary Lymph Nodes Enlarged Bilaterally] : axillary [Femoral Lymph Nodes Enlarged Bilaterally] : femoral [Inguinal Lymph Nodes Enlarged Bilaterally] : inguinal [No CVA Tenderness] : no ~M costovertebral angle tenderness [No Spinal Tenderness] : no spinal tenderness [Abnormal Walk] : normal gait [Musculoskeletal - Swelling] : no joint swelling seen [Nail Clubbing] : no clubbing  or cyanosis of the fingernails [Motor Tone] : muscle strength and tone were normal [Skin Color & Pigmentation] : normal skin color and pigmentation [Skin Turgor] : normal skin turgor [] : no rash [Deep Tendon Reflexes (DTR)] : deep tendon reflexes were 2+ and symmetric [Sensation] : the sensory exam was normal to light touch and pinprick [No Focal Deficits] : no focal deficits [Impaired Insight] : insight and judgment were intact [Oriented To Time, Place, And Person] : oriented to person, place, and time [Affect] : the affect was normal

## 2024-03-28 NOTE — DATA REVIEWED
[FreeTextEntry1] : CT chest completed on 12/22/2021 1. Similar appearance of a 1.7 cm left lower lobe nodule which has been present since 2014 although slightly increased in size from the 2014 study. 2. Left lower lobe 4 mm ground-glass nodule, similar. 3. Mild ectasia of the ascending thoracic aorta, similar

## 2024-05-07 ENCOUNTER — RESULT REVIEW (OUTPATIENT)
Age: 74
End: 2024-05-07

## 2024-05-07 ENCOUNTER — OUTPATIENT (OUTPATIENT)
Dept: OUTPATIENT SERVICES | Facility: HOSPITAL | Age: 74
LOS: 1 days | End: 2024-05-07
Payer: MEDICARE

## 2024-05-07 ENCOUNTER — APPOINTMENT (OUTPATIENT)
Dept: ORTHOPEDIC SURGERY | Facility: CLINIC | Age: 74
End: 2024-05-07
Payer: MEDICARE

## 2024-05-07 VITALS
HEART RATE: 69 BPM | OXYGEN SATURATION: 99 % | HEIGHT: 61 IN | BODY MASS INDEX: 24.35 KG/M2 | SYSTOLIC BLOOD PRESSURE: 131 MMHG | WEIGHT: 129 LBS | DIASTOLIC BLOOD PRESSURE: 81 MMHG

## 2024-05-07 DIAGNOSIS — Z47.1 AFTERCARE FOLLOWING JOINT REPLACEMENT SURGERY: ICD-10-CM

## 2024-05-07 DIAGNOSIS — Z96.652 AFTERCARE FOLLOWING JOINT REPLACEMENT SURGERY: ICD-10-CM

## 2024-05-07 PROCEDURE — 99214 OFFICE O/P EST MOD 30 MIN: CPT

## 2024-05-07 PROCEDURE — 73564 X-RAY EXAM KNEE 4 OR MORE: CPT

## 2024-05-07 PROCEDURE — 73564 X-RAY EXAM KNEE 4 OR MORE: CPT | Mod: 26,LT

## 2024-05-07 NOTE — HISTORY OF PRESENT ILLNESS
[de-identified] : L TKA 1/8/24 05/07/2024: 73 year old female now 4 months s/p L TKA, Cantonese-speaking, acoompanied by daughter. She reports her reports left knee is doing well with no pain. She has some ongoing right knee pain in the setting of known osteoarthritis.  9/15/2023: 72 year old, Cantonese speaking female is accompanied by her home attendant for evaluation of left greater than right knee pain. Patient has been having knee pain for about 5-6 years now. Localized primarily to the interior and lateral joint line, worse while walking. She notes a limited walking distance limitation with the use of a cane. She rates her knee pain at about 8/10. She has been taking Celebrex 100mg as needed for pain and notes mild relief of her symptoms with it. she states that she most recently had a left knee cortisone injection on August 16, which did help alleviate her symptoms and reduce pain. She states that the pain continues today.  She discontinued physical therapy about a month ago as she did not feel much benefit from participation. She denies any significant medical history or significant surgical history. She denies allergies and lives with her family in an apartment. She states that she would like to discuss left total knee arthroplasty today.

## 2024-05-07 NOTE — PHYSICAL EXAM
[de-identified] : General appearance: well nourished and hydrated, pleasant, alert and oriented x 3, cooperative.   HEENT: normocephalic, EOM intact, wearing mask, external auditory canal clear.   Cardiovascular: no lower leg edema, no varicosities, dorsalis pedis pulses palpable and symmetric.   Lymphatics: no palpable lymphadenopathy, no lymphedema.   Neurologic: sensation is normal, no muscle weakness in upper or lower extremities, patella tendon reflexes present and symmetric.   Dermatologic: skin moist, warm, no rash.   Spine: cervical spine with normal lordosis and painless range of motion, thoracic spine with normal kyphosis and painless range of motion, lumbosacral spine with normal lordosis and painless range of motion.  No tenderness to palpation along midline spine and paraspinal musculature.  Sacroiliac joints nontender bilaterally. Negative SLR and crossed SLR tests bilaterally. Gait: stable nonantalgic gait pattern without use of assistive device.    Left knee:  - Focal soft tissue swelling: none - Ecchymosis: none - Erythema: none - Effusion: none - Wounds: well-healed midline incision, benign-appearing - Alignment: normal - Tenderness: none - ROM: 0-130 - Collateral laxity: none - Cruciate laxity: none - Popliteal angle (degrees): 30 - Quad strength: 5/5  Right knee: - Focal soft tissue swelling: none - Ecchymosis: none - Erythema: none - Effusion: small, no palpable Baker's cyst - Wounds: none - Alignment: valgus - Tenderness: minimal medial jointline tenderness to palpation, no pain but mild crepitus with patellar compression test  - ROM: 3-135 - Collateral laxity: increased pseudolaxity to valgus - Cruciate laxity: none - Popliteal angle (degrees): 35 - Quad strength: 5/5 [de-identified] : Left knee XRs were interpreted by me and reviewed with the patient.  Location of imaging: [facility]  Date of exam: 05/07/2024  Left knee --  - Stable appearance of a left total knee arthroplasty as compared to prior imaging without evidence of mechanical complication. Patellar height: normal Patellar tracking: slight lateral subluxation

## 2024-05-07 NOTE — REASON FOR VISIT
[Follow-Up Visit] : a follow-up visit for [Other: ____] : [unfilled] [[employer] ] : provided by [employer]   [Interpreters_IDNumber] : [id number] [Interpreters_FullName] : Catalina [TWNoteComboBox1] : Hiram

## 2024-05-07 NOTE — END OF VISIT
[FreeTextEntry3] : All medical record entries made by the Scribe were at my, Dr. Gurjit Sanderson, direction and personally dictated by me on 05/07/2024. I have reviewed the chart and agree that the record accurately reflects my personal performance of the history, physical exam, assessment and plan. I have also personally directed, reviewed, and agreed with the chart.

## 2024-05-07 NOTE — ADDENDUM
[FreeTextEntry1] : I, Geovany Olmstead, documented this note as a scribe on behalf of Dr. Gurjit Sanderson on 05/07/2024.

## 2024-05-07 NOTE — DISCUSSION/SUMMARY
[de-identified] : Imp: 73 year old female now approx. 4 months s/p L TKA , doing very well, with mildly symptomatic right knee osteoarthritis. - Overall, she is recovering well. - The right knee does not have a surgical degree of osteoarthritis and recommend continuing with HEP. - She will follow up in January 2025 with repeat bilateral XRs, sooner as needed for new or worsening pain. - If the right knee pain and arthritis continue to worsen over time, we may consider right TKA. negative

## 2024-09-29 NOTE — H&P ADULT - NSHPREVIEWOFSYSTEMS_GEN_ALL_CORE
CONSTITUTIONAL: No fevers or chills  EYES AND ENT: No visual changes or no throat pain; difficulty swallowing  NECK: No pain or stiffness  RESPIRATORY: No hemoptysis or shortness of breath  CARDIOVASCULAR: No chest pain or palpitations  GASTROINTESTINAL: No melena or hematochezia  GENITOURINARY: No dysuria or hematuria  NEUROLOGICAL: +As stated in HPI above  SKIN: No itching, burning, rashes, or lesions MSK:+ left knee pain

## 2025-02-05 ENCOUNTER — RESULT REVIEW (OUTPATIENT)
Age: 75
End: 2025-02-05

## 2025-02-05 ENCOUNTER — OUTPATIENT (OUTPATIENT)
Dept: OUTPATIENT SERVICES | Facility: HOSPITAL | Age: 75
LOS: 1 days | End: 2025-02-05
Payer: MEDICARE

## 2025-02-05 ENCOUNTER — APPOINTMENT (OUTPATIENT)
Dept: ORTHOPEDIC SURGERY | Facility: CLINIC | Age: 75
End: 2025-02-05
Payer: MEDICARE

## 2025-02-05 VITALS
HEART RATE: 66 BPM | DIASTOLIC BLOOD PRESSURE: 74 MMHG | BODY MASS INDEX: 23.6 KG/M2 | HEIGHT: 61 IN | OXYGEN SATURATION: 98 % | SYSTOLIC BLOOD PRESSURE: 122 MMHG | WEIGHT: 125 LBS

## 2025-02-05 DIAGNOSIS — M17.0 BILATERAL PRIMARY OSTEOARTHRITIS OF KNEE: ICD-10-CM

## 2025-02-05 DIAGNOSIS — M17.11 UNILATERAL PRIMARY OSTEOARTHRITIS, RIGHT KNEE: ICD-10-CM

## 2025-02-05 DIAGNOSIS — Z98.890 OTHER SPECIFIED POSTPROCEDURAL STATES: Chronic | ICD-10-CM

## 2025-02-05 DIAGNOSIS — Z96.652 AFTERCARE FOLLOWING JOINT REPLACEMENT SURGERY: ICD-10-CM

## 2025-02-05 DIAGNOSIS — Z47.1 AFTERCARE FOLLOWING JOINT REPLACEMENT SURGERY: ICD-10-CM

## 2025-02-05 PROCEDURE — 73564 X-RAY EXAM KNEE 4 OR MORE: CPT

## 2025-02-05 PROCEDURE — 77073 BONE LENGTH STUDIES: CPT

## 2025-02-05 PROCEDURE — 77073 BONE LENGTH STUDIES: CPT | Mod: 26

## 2025-02-05 PROCEDURE — 73564 X-RAY EXAM KNEE 4 OR MORE: CPT | Mod: 26,50,XE

## 2025-02-05 PROCEDURE — 99215 OFFICE O/P EST HI 40 MIN: CPT

## 2025-02-14 DIAGNOSIS — Z96.652 PRESENCE OF LEFT ARTIFICIAL KNEE JOINT: ICD-10-CM

## 2025-02-14 DIAGNOSIS — M17.11 UNILATERAL PRIMARY OSTEOARTHRITIS, RIGHT KNEE: ICD-10-CM

## 2025-02-14 DIAGNOSIS — Z47.1 AFTERCARE FOLLOWING JOINT REPLACEMENT SURGERY: ICD-10-CM

## 2025-03-06 ENCOUNTER — APPOINTMENT (OUTPATIENT)
Dept: INTERNAL MEDICINE | Facility: CLINIC | Age: 75
End: 2025-03-06
Payer: MEDICARE

## 2025-03-06 ENCOUNTER — NON-APPOINTMENT (OUTPATIENT)
Age: 75
End: 2025-03-06

## 2025-03-06 ENCOUNTER — LABORATORY RESULT (OUTPATIENT)
Age: 75
End: 2025-03-06

## 2025-03-06 VITALS
OXYGEN SATURATION: 97 % | SYSTOLIC BLOOD PRESSURE: 136 MMHG | TEMPERATURE: 98.2 F | HEART RATE: 70 BPM | BODY MASS INDEX: 23.6 KG/M2 | WEIGHT: 125 LBS | HEIGHT: 61 IN | DIASTOLIC BLOOD PRESSURE: 74 MMHG

## 2025-03-06 DIAGNOSIS — Z01.818 ENCOUNTER FOR OTHER PREPROCEDURAL EXAMINATION: ICD-10-CM

## 2025-03-06 PROCEDURE — G0403: CPT

## 2025-03-06 PROCEDURE — 36415 COLL VENOUS BLD VENIPUNCTURE: CPT

## 2025-03-06 PROCEDURE — 99204 OFFICE O/P NEW MOD 45 MIN: CPT

## 2025-03-10 LAB
ALBUMIN SERPL ELPH-MCNC: 4.2 G/DL
ALP BLD-CCNC: 55 U/L
ALT SERPL-CCNC: 12 U/L
ANION GAP SERPL CALC-SCNC: 11 MMOL/L
APPEARANCE: CLEAR
APTT BLD: 33.6 SEC
AST SERPL-CCNC: 16 U/L
BILIRUB SERPL-MCNC: 0.5 MG/DL
BILIRUBIN URINE: NEGATIVE
BLOOD URINE: NEGATIVE
BUN SERPL-MCNC: 12 MG/DL
CALCIUM SERPL-MCNC: 9.3 MG/DL
CHLORIDE SERPL-SCNC: 106 MMOL/L
CO2 SERPL-SCNC: 24 MMOL/L
COLOR: YELLOW
CREAT SERPL-MCNC: 0.66 MG/DL
EGFRCR SERPLBLD CKD-EPI 2021: 93 ML/MIN/1.73M2
ESTIMATED AVERAGE GLUCOSE: 123 MG/DL
GLUCOSE QUALITATIVE U: NEGATIVE MG/DL
GLUCOSE SERPL-MCNC: 95 MG/DL
HBA1C MFR BLD HPLC: 5.9 %
HCT VFR BLD CALC: 43.7 %
HGB BLD-MCNC: 14.5 G/DL
KETONES URINE: NEGATIVE MG/DL
LEUKOCYTE ESTERASE URINE: NEGATIVE
MCHC RBC-ENTMCNC: 31.9 PG
MCHC RBC-ENTMCNC: 33.2 G/DL
MCV RBC AUTO: 96.3 FL
MRSA SPEC QL CULT: NOT DETECTED
NITRITE URINE: NEGATIVE
PH URINE: 7
PLATELET # BLD AUTO: 278 K/UL
POTASSIUM SERPL-SCNC: 4.2 MMOL/L
PROT SERPL-MCNC: 6.6 G/DL
PROTEIN URINE: NEGATIVE MG/DL
RBC # BLD: 4.54 M/UL
RBC # FLD: 12.7 %
SODIUM SERPL-SCNC: 141 MMOL/L
SPECIFIC GRAVITY URINE: 1.01
STAPH AUREUS (SA): NOT DETECTED
UROBILINOGEN URINE: 0.2 MG/DL
WBC # FLD AUTO: 5.68 K/UL

## 2025-03-14 LAB
ALBUMIN MFR SERPL ELPH: 58.5 %
ALBUMIN SERPL-MCNC: 3.9 G/DL
ALBUMIN/GLOB SERPL: 1.4 RATIO
ALPHA1 GLOB MFR SERPL ELPH: 3.9 %
ALPHA1 GLOB SERPL ELPH-MCNC: 0.3 G/DL
ALPHA2 GLOB MFR SERPL ELPH: 10.5 %
ALPHA2 GLOB SERPL ELPH-MCNC: 0.7 G/DL
B-GLOBULIN MFR SERPL ELPH: 11.6 %
B-GLOBULIN SERPL ELPH-MCNC: 0.8 G/DL
GAMMA GLOB FLD ELPH-MCNC: 1 G/DL
GAMMA GLOB MFR SERPL ELPH: 15.5 %
INTERPRETATION SERPL IEP-IMP: NORMAL
PROT SERPL-MCNC: 6.6 G/DL
PROT SERPL-MCNC: 6.6 G/DL

## 2025-03-20 ENCOUNTER — APPOINTMENT (OUTPATIENT)
Dept: ORTHOPEDIC SURGERY | Facility: HOSPITAL | Age: 75
End: 2025-03-20

## 2025-03-31 VITALS
WEIGHT: 125 LBS | HEIGHT: 62 IN | OXYGEN SATURATION: 97 % | RESPIRATION RATE: 16 BRPM | TEMPERATURE: 98 F | SYSTOLIC BLOOD PRESSURE: 104 MMHG | DIASTOLIC BLOOD PRESSURE: 63 MMHG | HEART RATE: 57 BPM

## 2025-03-31 NOTE — H&P ADULT - NSICDXPASTMEDICALHX_GEN_ALL_CORE_FT
PAST MEDICAL HISTORY:  Osteoarthritis of left knee      PAST MEDICAL HISTORY:  HLD (hyperlipidemia)     Osteoarthritis of left knee     PVD (peripheral vascular disease)

## 2025-03-31 NOTE — H&P ADULT - PT PULSE
Forms/Letter Request    Type of form/letter: [facility] order #'s [id number], [id number]    Do we have the form/letter: Yes:     Who is the form from? Home care    Where did/will the form come from? form was faxed in    When is form/letter needed by: asap    How would you like the form/letter returned: Fax : [phone]       1+ b/l

## 2025-03-31 NOTE — H&P ADULT - NSHPLABSRESULTS_GEN_ALL_CORE
14.3   5.80  )-----------( 273      ( 02 Apr 2025 12:54 )             42.6       04-02    137  |  100  |  14  ----------------------------<  93  3.6   |  24  |  0.66    Ca    8.9      02 Apr 2025 12:54  Mg     2.4     04-02    TPro  7.2  /  Alb  4.1  /  TBili  0.4  /  DBili  x   /  AST  18  /  ALT  17  /  AlkPhos  52  04-02      PT/INR - ( 02 Apr 2025 12:54 )   PT: 10.9 sec;   INR: 0.95          PTT - ( 02 Apr 2025 12:54 )  PTT:33.8 sec    CARDIAC MARKERS ( 02 Apr 2025 12:54 )  x     / x     / x     / x     / 6.8 ng/mL        Urinalysis Basic - ( 02 Apr 2025 12:54 )    Color: x / Appearance: x / SG: x / pH: x  Gluc: 93 mg/dL / Ketone: x  / Bili: x / Urobili: x   Blood: x / Protein: x / Nitrite: x   Leuk Esterase: x / RBC: x / WBC x   Sq Epi: x / Non Sq Epi: x / Bacteria: x        EKG: obtained from outpatient records-- NSR without ischemic changes.

## 2025-03-31 NOTE — H&P ADULT - HISTORY OF PRESENT ILLNESS
Cardiologist: Dr. Elias Michaels  Escort:  Pharmacy: [facility]    75 yo ___ Speaking F with a PMH of HLD, PVD, OA s/p L total knee replacement 2024 who presented to outpatient cardiologist Dr. Michaels with complaints of a substernal non radiating intermittent chest pressure on exertion of < 2 blocks, relieved with rest, worsening over the past few weeks. Additionally endorses dizziness and b/l severe calf/foot heaviness. Denies SOB, diaphoresis, palpitations, orthopnea/PND, BRBPR, hematuria, melena, LE swelling. NST 3/7/25: moderate perfusion abnormality of severe intensity in the anterior region which was reversible with rest, post stress EF 71%, normal wall motion. TTE 2/25/25: EF 70%, mild MR, trace TR. In light of patient's risk factors, CCS angina class III sx and abnormal NST, patient is referred for cardiac catheterization with possible intervention if clinically indicated.  Cardiologist: Dr. Elias Michaels  Escort: daughter + HHA   Pharmacy: [facility]    75 yo Cantonese Speaking F with a PMH of HLD, PVD, OA s/p L total knee replacement 2024 who presented to outpatient cardiologist Dr. Michaels with complaints of a substernal non radiating intermittent chest pressure on exertion of < 2 blocks, relieved with rest, worsening over the past few weeks. Additionally endorses dizziness and b/l severe calf/foot heaviness. Denies SOB, diaphoresis, palpitations, orthopnea/PND, BRBPR, hematuria, melena, LE swelling. NST 3/7/25: moderate perfusion abnormality of severe intensity in the anterior region which was reversible with rest, post stress EF 71%, normal wall motion. TTE 2/25/25: EF 70%, mild MR, trace TR. In light of patient's risk factors, CCS angina class III sx and abnormal NST, patient is referred for cardiac catheterization with possible intervention if clinically indicated.

## 2025-03-31 NOTE — H&P ADULT - ASSESSMENT
75 yo Cantonese Speaking F with a PMH of HLD, PVD, OA s/p L total knee replacement 2024 who in light of patient's risk factors, CCS angina class III sx and abnormal NST, patient is referred for cardiac catheterization with possible intervention if clinically indicated.                		  ASA II	Mallampati class: II     Anginal Class: __    -No Known Allergies    -H/H = 14.3/42.6  -Pt denies BRBPR, hematuria, hematochezia, melena. Pt endorses compliance w/ home ASA 81mg, stating last dose was 4/2/15. Pt loaded w/ Plavix 600 mg x1  -BUN/Cr =   -EF 70% by echo. Euvolemic on exam. IV NS @ 250cc bolus followed by 75 cc/hr x 2 hrs started pre procedure    Sedation Plan:   Moderate  Patient Is Suitable Candidate For Sedation?     Yes    Risks & benefits of procedure and alternative therapy have been explained to the patient by the Interventional Cardiology Fellow including but not limited to: allergic reaction, bleeding with possible need for blood transfusion, infection, renal and vascular compromise, limb damage, arrhythmia, stroke, vessel dissection/perforation, myocardial infarction, and emergent CABG. Informed consent obtained at bedside and included in chart.     73 yo Cantonese Speaking F with a PMH of HLD, PVD, OA s/p L total knee replacement 2024 who in light of patient's risk factors, CCS angina class III sx and abnormal NST, patient is referred for cardiac catheterization with possible intervention if clinically indicated.                		  ASA II	Mallampati class: II     Anginal Class: II    -No Known Allergies    -H/H = 14.3/42.6  -Pt denies BRBPR, hematuria, hematochezia, melena. Pt endorses compliance w/ home ASA 81mg, stating last dose was 4/2/15. Pt loaded w/ Plavix 600 mg x1  -BUN/Cr = 14/0.66   -EF 70% by echo. Euvolemic on exam. IV NS @ 250cc bolus followed by 75 cc/hr x 2 hrs started pre procedure    Sedation Plan:   Moderate  Patient Is Suitable Candidate For Sedation?     Yes    Risks & benefits of procedure and alternative therapy have been explained to the patient by the Interventional Cardiology Fellow including but not limited to: allergic reaction, bleeding with possible need for blood transfusion, infection, renal and vascular compromise, limb damage, arrhythmia, stroke, vessel dissection/perforation, myocardial infarction, and emergent CABG. Informed consent obtained at bedside and included in chart.

## 2025-04-02 ENCOUNTER — OUTPATIENT (OUTPATIENT)
Dept: OUTPATIENT SERVICES | Facility: HOSPITAL | Age: 75
LOS: 1 days | Discharge: HOME | End: 2025-04-02
Payer: MEDICARE

## 2025-04-02 DIAGNOSIS — Z98.890 OTHER SPECIFIED POSTPROCEDURAL STATES: Chronic | ICD-10-CM

## 2025-04-02 LAB
A1C WITH ESTIMATED AVERAGE GLUCOSE RESULT: 5.7 % — HIGH (ref 4–5.6)
ALBUMIN SERPL ELPH-MCNC: 4.1 G/DL — SIGNIFICANT CHANGE UP (ref 3.3–5)
ALP SERPL-CCNC: 52 U/L — SIGNIFICANT CHANGE UP (ref 40–120)
ALT FLD-CCNC: 17 U/L — SIGNIFICANT CHANGE UP (ref 10–45)
ANION GAP SERPL CALC-SCNC: 13 MMOL/L — SIGNIFICANT CHANGE UP (ref 5–17)
APTT BLD: 33.8 SEC — SIGNIFICANT CHANGE UP (ref 24.5–35.6)
AST SERPL-CCNC: 18 U/L — SIGNIFICANT CHANGE UP (ref 10–40)
BASOPHILS # BLD AUTO: 0.08 K/UL — SIGNIFICANT CHANGE UP (ref 0–0.2)
BASOPHILS NFR BLD AUTO: 1.4 % — SIGNIFICANT CHANGE UP (ref 0–2)
BILIRUB SERPL-MCNC: 0.4 MG/DL — SIGNIFICANT CHANGE UP (ref 0.2–1.2)
BUN SERPL-MCNC: 14 MG/DL — SIGNIFICANT CHANGE UP (ref 7–23)
CALCIUM SERPL-MCNC: 8.9 MG/DL — SIGNIFICANT CHANGE UP (ref 8.4–10.5)
CHLORIDE SERPL-SCNC: 100 MMOL/L — SIGNIFICANT CHANGE UP (ref 96–108)
CHOLEST SERPL-MCNC: 193 MG/DL — SIGNIFICANT CHANGE UP
CK MB CFR SERPL CALC: 6.8 NG/ML — HIGH (ref 0–6.7)
CK SERPL-CCNC: 199 U/L — HIGH (ref 25–170)
CO2 SERPL-SCNC: 24 MMOL/L — SIGNIFICANT CHANGE UP (ref 22–31)
CREAT SERPL-MCNC: 0.66 MG/DL — SIGNIFICANT CHANGE UP (ref 0.5–1.3)
EGFR: 93 ML/MIN/1.73M2 — SIGNIFICANT CHANGE UP
EGFR: 93 ML/MIN/1.73M2 — SIGNIFICANT CHANGE UP
EOSINOPHIL # BLD AUTO: 0.23 K/UL — SIGNIFICANT CHANGE UP (ref 0–0.5)
EOSINOPHIL NFR BLD AUTO: 4 % — SIGNIFICANT CHANGE UP (ref 0–6)
ESTIMATED AVERAGE GLUCOSE: 117 MG/DL — HIGH (ref 68–114)
GLUCOSE SERPL-MCNC: 93 MG/DL — SIGNIFICANT CHANGE UP (ref 70–99)
HCT VFR BLD CALC: 42.6 % — SIGNIFICANT CHANGE UP (ref 34.5–45)
HDLC SERPL-MCNC: 60 MG/DL — SIGNIFICANT CHANGE UP
HGB BLD-MCNC: 14.3 G/DL — SIGNIFICANT CHANGE UP (ref 11.5–15.5)
IMM GRANULOCYTES NFR BLD AUTO: 0.3 % — SIGNIFICANT CHANGE UP (ref 0–0.9)
INR BLD: 0.95 — SIGNIFICANT CHANGE UP (ref 0.85–1.16)
LIPID PNL WITH DIRECT LDL SERPL: 118 MG/DL — HIGH
LYMPHOCYTES # BLD AUTO: 1.6 K/UL — SIGNIFICANT CHANGE UP (ref 1–3.3)
LYMPHOCYTES # BLD AUTO: 27.6 % — SIGNIFICANT CHANGE UP (ref 13–44)
MAGNESIUM SERPL-MCNC: 2.4 MG/DL — SIGNIFICANT CHANGE UP (ref 1.6–2.6)
MCHC RBC-ENTMCNC: 32.1 PG — SIGNIFICANT CHANGE UP (ref 27–34)
MCHC RBC-ENTMCNC: 33.6 G/DL — SIGNIFICANT CHANGE UP (ref 32–36)
MCV RBC AUTO: 95.5 FL — SIGNIFICANT CHANGE UP (ref 80–100)
MONOCYTES # BLD AUTO: 0.47 K/UL — SIGNIFICANT CHANGE UP (ref 0–0.9)
MONOCYTES NFR BLD AUTO: 8.1 % — SIGNIFICANT CHANGE UP (ref 2–14)
NEUTROPHILS # BLD AUTO: 3.4 K/UL — SIGNIFICANT CHANGE UP (ref 1.8–7.4)
NEUTROPHILS NFR BLD AUTO: 58.6 % — SIGNIFICANT CHANGE UP (ref 43–77)
NON HDL CHOLESTEROL: 133 MG/DL — HIGH
NRBC BLD AUTO-RTO: 0 /100 WBCS — SIGNIFICANT CHANGE UP (ref 0–0)
PLATELET # BLD AUTO: 273 K/UL — SIGNIFICANT CHANGE UP (ref 150–400)
POTASSIUM SERPL-MCNC: 3.6 MMOL/L — SIGNIFICANT CHANGE UP (ref 3.5–5.3)
POTASSIUM SERPL-SCNC: 3.6 MMOL/L — SIGNIFICANT CHANGE UP (ref 3.5–5.3)
PROT SERPL-MCNC: 7.2 G/DL — SIGNIFICANT CHANGE UP (ref 6–8.3)
PROTHROM AB SERPL-ACNC: 10.9 SEC — SIGNIFICANT CHANGE UP (ref 9.9–13.4)
RBC # BLD: 4.46 M/UL — SIGNIFICANT CHANGE UP (ref 3.8–5.2)
RBC # FLD: 12.2 % — SIGNIFICANT CHANGE UP (ref 10.3–14.5)
SODIUM SERPL-SCNC: 137 MMOL/L — SIGNIFICANT CHANGE UP (ref 135–145)
TRIGL SERPL-MCNC: 85 MG/DL — SIGNIFICANT CHANGE UP
WBC # BLD: 5.8 K/UL — SIGNIFICANT CHANGE UP (ref 3.8–10.5)
WBC # FLD AUTO: 5.8 K/UL — SIGNIFICANT CHANGE UP (ref 3.8–10.5)

## 2025-04-02 PROCEDURE — 82550 ASSAY OF CK (CPK): CPT

## 2025-04-02 PROCEDURE — C1887: CPT

## 2025-04-02 PROCEDURE — 85610 PROTHROMBIN TIME: CPT

## 2025-04-02 PROCEDURE — 83735 ASSAY OF MAGNESIUM: CPT

## 2025-04-02 PROCEDURE — 83036 HEMOGLOBIN GLYCOSYLATED A1C: CPT

## 2025-04-02 PROCEDURE — C1769: CPT

## 2025-04-02 PROCEDURE — 85025 COMPLETE CBC W/AUTO DIFF WBC: CPT

## 2025-04-02 PROCEDURE — 93458 L HRT ARTERY/VENTRICLE ANGIO: CPT | Mod: 26

## 2025-04-02 PROCEDURE — 82553 CREATINE MB FRACTION: CPT

## 2025-04-02 PROCEDURE — 85730 THROMBOPLASTIN TIME PARTIAL: CPT

## 2025-04-02 PROCEDURE — C1894: CPT

## 2025-04-02 PROCEDURE — 93458 L HRT ARTERY/VENTRICLE ANGIO: CPT

## 2025-04-02 PROCEDURE — 80061 LIPID PANEL: CPT

## 2025-04-02 PROCEDURE — 36415 COLL VENOUS BLD VENIPUNCTURE: CPT

## 2025-04-02 PROCEDURE — 80053 COMPREHEN METABOLIC PANEL: CPT

## 2025-04-02 RX ORDER — CLOPIDOGREL BISULFATE 75 MG/1
600 TABLET, FILM COATED ORAL ONCE
Refills: 0 | Status: COMPLETED | OUTPATIENT
Start: 2025-04-02 | End: 2025-04-02

## 2025-04-02 RX ORDER — ASPIRIN 325 MG
1 TABLET ORAL
Refills: 0 | DISCHARGE

## 2025-04-02 RX ORDER — DENOSUMAB 60 MG/ML
60 INJECTION SUBCUTANEOUS
Refills: 0 | DISCHARGE

## 2025-04-02 RX ORDER — ASPIRIN 325 MG
81 TABLET ORAL ONCE
Refills: 0 | Status: DISCONTINUED | OUTPATIENT
Start: 2025-04-02 | End: 2025-04-02

## 2025-04-02 RX ORDER — METOPROLOL SUCCINATE 50 MG/1
1 TABLET, EXTENDED RELEASE ORAL
Refills: 0 | DISCHARGE

## 2025-04-02 RX ORDER — AMLODIPINE BESYLATE 10 MG/1
1 TABLET ORAL
Refills: 0 | DISCHARGE

## 2025-04-02 RX ADMIN — Medication 20 MILLIEQUIVALENT(S): at 15:54

## 2025-04-02 RX ADMIN — CLOPIDOGREL BISULFATE 600 MILLIGRAM(S): 75 TABLET, FILM COATED ORAL at 13:49

## 2025-04-02 RX ADMIN — Medication 75 MILLILITER(S): at 13:49

## 2025-04-02 RX ADMIN — Medication 40 MILLIEQUIVALENT(S): at 15:56

## 2025-04-02 RX ADMIN — Medication 112 MILLILITER(S): at 16:56

## 2025-04-02 RX ADMIN — Medication 500 MILLILITER(S): at 13:50

## 2025-04-02 NOTE — PROGRESS NOTE ADULT - SUBJECTIVE AND OBJECTIVE BOX
Interventional Cardiology PA SDA Discharge Note    Patient without complaints. Ambulated and voided without difficulties    Afebrile, VSS    Ext:    Right Radial :  no hematoma,  no bleeding, dressing; C/D/I      Pulses:    intact RAD/DP/PT?to baseline     A/P:     75 yo Cantonese Speaking F with a PMH of HLD, PVD, OA s/p L total knee replacement 2024 who in light of patient's risk factors, CCS angina class III sx and abnormal NST, patient is referred for cardiac catheterization with possible intervention if clinically indicated.      S/p Cardiac Catheterization (4/2/25): L normal, LAD minor irregularities, LCx minor irregulairites, RCA large dominant minor irregularities, no significant CAD. Right radial access TR band. LVED 12 mmHg. [Interventionalist: Dr. Munoz].      1.	Stable for discharge as per attending Dr. Munoz after bed rest, pt voids, wrist stable and 30 minutes of ambulation.  2.	Follow-up with PMD/Cardiologist Michaels in 1-2 weeks  3.	Discharged forms signed and copies in chart

## 2025-04-04 ENCOUNTER — APPOINTMENT (OUTPATIENT)
Dept: ORTHOPEDIC SURGERY | Facility: CLINIC | Age: 75
End: 2025-04-04

## 2025-04-15 PROBLEM — E78.5 HYPERLIPIDEMIA, UNSPECIFIED: Chronic | Status: ACTIVE | Noted: 2025-04-02

## 2025-04-15 PROBLEM — I73.9 PERIPHERAL VASCULAR DISEASE, UNSPECIFIED: Chronic | Status: ACTIVE | Noted: 2025-04-02

## 2025-04-24 ENCOUNTER — APPOINTMENT (OUTPATIENT)
Dept: INTERNAL MEDICINE | Facility: CLINIC | Age: 75
End: 2025-04-24
Payer: MEDICARE

## 2025-04-24 VITALS
HEART RATE: 68 BPM | TEMPERATURE: 97.7 F | BODY MASS INDEX: 23.6 KG/M2 | OXYGEN SATURATION: 97 % | SYSTOLIC BLOOD PRESSURE: 148 MMHG | DIASTOLIC BLOOD PRESSURE: 80 MMHG | HEIGHT: 61 IN | WEIGHT: 125 LBS

## 2025-04-24 DIAGNOSIS — Z01.818 ENCOUNTER FOR OTHER PREPROCEDURAL EXAMINATION: ICD-10-CM

## 2025-04-24 PROCEDURE — 99214 OFFICE O/P EST MOD 30 MIN: CPT

## 2025-04-29 LAB
APPEARANCE: CLEAR
BILIRUBIN URINE: NEGATIVE
BLOOD URINE: NEGATIVE
COLOR: YELLOW
GLUCOSE QUALITATIVE U: NEGATIVE MG/DL
KETONES URINE: NEGATIVE MG/DL
LEUKOCYTE ESTERASE URINE: NEGATIVE
MRSA SPEC QL CULT: NOT DETECTED
NITRITE URINE: NEGATIVE
PH URINE: 6
PROTEIN URINE: NEGATIVE MG/DL
SPECIFIC GRAVITY URINE: 1.01
STAPH AUREUS (SA): NOT DETECTED
UROBILINOGEN URINE: 0.2 MG/DL

## 2025-05-02 VITALS
TEMPERATURE: 98 F | OXYGEN SATURATION: 97 % | HEIGHT: 61 IN | DIASTOLIC BLOOD PRESSURE: 72 MMHG | HEART RATE: 70 BPM | SYSTOLIC BLOOD PRESSURE: 115 MMHG | RESPIRATION RATE: 16 BRPM | WEIGHT: 123.46 LBS

## 2025-05-02 DIAGNOSIS — Z96.652 AFTERCARE FOLLOWING JOINT REPLACEMENT SURGERY: ICD-10-CM

## 2025-05-02 DIAGNOSIS — Z47.1 AFTERCARE FOLLOWING JOINT REPLACEMENT SURGERY: ICD-10-CM

## 2025-05-02 DIAGNOSIS — Z96.653 AFTERCARE FOLLOWING JOINT REPLACEMENT SURGERY: ICD-10-CM

## 2025-05-02 DIAGNOSIS — M17.11 UNILATERAL PRIMARY OSTEOARTHRITIS, RIGHT KNEE: ICD-10-CM

## 2025-05-02 RX ORDER — ASPIRIN 81 MG/1
81 TABLET, DELAYED RELEASE ORAL
Qty: 60 | Refills: 0 | Status: ACTIVE | COMMUNITY
Start: 2025-05-02 | End: 1900-01-01

## 2025-05-02 RX ORDER — POVIDONE-IODINE 7.5 %
1 SOLUTION, NON-ORAL TOPICAL ONCE
Refills: 0 | Status: DISCONTINUED | OUTPATIENT
Start: 2025-05-05 | End: 2025-05-06

## 2025-05-02 RX ORDER — PANTOPRAZOLE 40 MG/1
40 TABLET, DELAYED RELEASE ORAL DAILY
Qty: 30 | Refills: 2 | Status: ACTIVE | COMMUNITY
Start: 2025-05-02 | End: 1900-01-01

## 2025-05-02 NOTE — ASU PATIENT PROFILE, ADULT - NSICDXPASTSURGICALHX_GEN_ALL_CORE_FT
PAST SURGICAL HISTORY:  H/O myomectomy      PAST SURGICAL HISTORY:  H/O eye surgery LEFT CATARACT- LEFT EYE BLURRY    H/O foot surgery LEFT    H/O myomectomy     H/O: hysterectomy      PAST SURGICAL HISTORY:  H/O eye surgery LEFT CATARACT- LEFT EYE BLURRY    H/O myomectomy     H/O: hysterectomy     History of left knee replacement

## 2025-05-02 NOTE — H&P ADULT - NSHPPHYSICALEXAM_GEN_ALL_CORE
MSK: + decreased ROM 2/2 pain, right knee     Remainder of exam per medical clearance note Gen: Alert and oriented, NAD  MSK: + decreased ROM 2/2 pain, right knee   No evidence of deformity or open wound  2+ DP pulses palpable bilaterally, skin wwp, cap refill brisk  SILT to bilateral distal lower extremities  EHL/FHL/TA/GS 5/5 bilaterally     Remainder of exam per medical clearance note

## 2025-05-02 NOTE — H&P ADULT - NSHPLABSRESULTS_GEN_ALL_CORE
Preop CBC, BMP, PTT, UA - WNL per medical clearance   H/H 14.5/43.7  Cr .66  Preop EKG - sinus rhythm - WNL per medical clearance   Cardiac Cath 4/2/25 WNL  Nasal swab negative  3M DOS

## 2025-05-02 NOTE — H&P ADULT - PROBLEM SELECTOR PLAN 1
Admit to Orthopaedic Service.  Presents today for elective right TKR   Pt medically stable and cleared for procedure today

## 2025-05-02 NOTE — ASU PATIENT PROFILE, ADULT - FALL HARM RISK - RISK INTERVENTIONS

## 2025-05-02 NOTE — H&P ADULT - HISTORY OF PRESENT ILLNESS
74F with right knee pain x     Has the patient used any narcotic more than 90 days prior to the date of surgery? YES or NO     Present for elective right total knee replacement  Patient Cantonese speaking -  used for encounter: [id number] with right knee pain x many years without inciting accident or injury. No numbness/tingling to bilateral lower extremities. She occasionally takes Tylenol at home for pain. Pain persists despite conservative measures. Ambulates with the assistance of a cane at baseline.   Denies history of blood clots/seizures. Denies CP/SOB/N/V.     Has the patient used any narcotic more than 90 days prior to the date of surgery? NO     Present for elective right total knee replacement

## 2025-05-02 NOTE — ASU PATIENT PROFILE, ADULT - NSICDXPASTMEDICALHX_GEN_ALL_CORE_FT
PAST MEDICAL HISTORY:  H/O osteoporosis     H/O: glaucoma     HLD (hyperlipidemia)     HTN (hypertension)     Lung nodule     Osteoarthritis of left knee     PVD (peripheral vascular disease)

## 2025-05-05 ENCOUNTER — RESULT REVIEW (OUTPATIENT)
Age: 75
End: 2025-05-05

## 2025-05-05 ENCOUNTER — APPOINTMENT (OUTPATIENT)
Dept: ORTHOPEDIC SURGERY | Facility: HOSPITAL | Age: 75
End: 2025-05-05

## 2025-05-05 ENCOUNTER — TRANSCRIPTION ENCOUNTER (OUTPATIENT)
Age: 75
End: 2025-05-05

## 2025-05-05 ENCOUNTER — INPATIENT (INPATIENT)
Facility: HOSPITAL | Age: 75
LOS: 0 days | Discharge: HOME HEALTH | DRG: 470 | End: 2025-05-06
Attending: ORTHOPAEDIC SURGERY | Admitting: ORTHOPAEDIC SURGERY
Payer: MEDICARE

## 2025-05-05 DIAGNOSIS — I10 ESSENTIAL (PRIMARY) HYPERTENSION: ICD-10-CM

## 2025-05-05 DIAGNOSIS — Z98.890 OTHER SPECIFIED POSTPROCEDURAL STATES: Chronic | ICD-10-CM

## 2025-05-05 DIAGNOSIS — Z96.652 PRESENCE OF LEFT ARTIFICIAL KNEE JOINT: Chronic | ICD-10-CM

## 2025-05-05 DIAGNOSIS — Z90.710 ACQUIRED ABSENCE OF BOTH CERVIX AND UTERUS: Chronic | ICD-10-CM

## 2025-05-05 DIAGNOSIS — M17.11 UNILATERAL PRIMARY OSTEOARTHRITIS, RIGHT KNEE: ICD-10-CM

## 2025-05-05 PROCEDURE — 27447 TOTAL KNEE ARTHROPLASTY: CPT | Mod: RT

## 2025-05-05 PROCEDURE — 20985 CPTR-ASST DIR MS PX: CPT

## 2025-05-05 PROCEDURE — 73560 X-RAY EXAM OF KNEE 1 OR 2: CPT | Mod: 26,RT

## 2025-05-05 DEVICE — IMPLANTABLE DEVICE: Type: IMPLANTABLE DEVICE | Site: RIGHT | Status: FUNCTIONAL

## 2025-05-05 DEVICE — ZIMMER/NEXGEN SMOOTH PIN 3.2X75MM: Type: IMPLANTABLE DEVICE | Site: RIGHT | Status: FUNCTIONAL

## 2025-05-05 DEVICE — ZIMMER/NEXGEN HEX HEAD SCREW 3.5MM: Type: IMPLANTABLE DEVICE | Site: RIGHT | Status: FUNCTIONAL

## 2025-05-05 DEVICE — STEM EXT PERSONA 14MM PLUS 30M: Type: IMPLANTABLE DEVICE | Site: RIGHT | Status: FUNCTIONAL

## 2025-05-05 DEVICE — FEM PERSONA PS CCR STD SZ 4: Type: IMPLANTABLE DEVICE | Site: RIGHT | Status: FUNCTIONAL

## 2025-05-05 DEVICE — STEM TIBIA PERSONA SZ  5/CR: Type: IMPLANTABLE DEVICE | Site: RIGHT | Status: FUNCTIONAL

## 2025-05-05 DEVICE — CEMENT PALACOS R: Type: IMPLANTABLE DEVICE | Site: RIGHT | Status: FUNCTIONAL

## 2025-05-05 DEVICE — ZIMMER FEMALE HEX SCREW MAGNETIC 2.5MM X 25MM: Type: IMPLANTABLE DEVICE | Site: RIGHT | Status: FUNCTIONAL

## 2025-05-05 RX ORDER — MAGNESIUM HYDROXIDE 400 MG/5ML
30 SUSPENSION ORAL DAILY
Refills: 0 | Status: DISCONTINUED | OUTPATIENT
Start: 2025-05-05 | End: 2025-05-06

## 2025-05-05 RX ORDER — OXYCODONE HYDROCHLORIDE 30 MG/1
10 TABLET ORAL
Refills: 0 | Status: DISCONTINUED | OUTPATIENT
Start: 2025-05-05 | End: 2025-05-06

## 2025-05-05 RX ORDER — HYDROMORPHONE/SOD CHLOR,ISO/PF 2 MG/10 ML
0.5 SYRINGE (ML) INJECTION
Refills: 0 | Status: DISCONTINUED | OUTPATIENT
Start: 2025-05-05 | End: 2025-05-06

## 2025-05-05 RX ORDER — SODIUM CHLORIDE 9 G/1000ML
1000 INJECTION, SOLUTION INTRAVENOUS
Refills: 0 | Status: DISCONTINUED | OUTPATIENT
Start: 2025-05-05 | End: 2025-05-06

## 2025-05-05 RX ORDER — OXYCODONE HYDROCHLORIDE 30 MG/1
5 TABLET ORAL
Refills: 0 | Status: DISCONTINUED | OUTPATIENT
Start: 2025-05-05 | End: 2025-05-06

## 2025-05-05 RX ORDER — ASPIRIN 325 MG
81 TABLET ORAL
Refills: 0 | Status: DISCONTINUED | OUTPATIENT
Start: 2025-05-06 | End: 2025-05-06

## 2025-05-05 RX ORDER — ACETAMINOPHEN 500 MG/5ML
1000 LIQUID (ML) ORAL ONCE
Refills: 0 | Status: COMPLETED | OUTPATIENT
Start: 2025-05-05 | End: 2025-05-05

## 2025-05-05 RX ORDER — ONDANSETRON HCL/PF 4 MG/2 ML
4 VIAL (ML) INJECTION EVERY 6 HOURS
Refills: 0 | Status: DISCONTINUED | OUTPATIENT
Start: 2025-05-05 | End: 2025-05-06

## 2025-05-05 RX ORDER — ACETAMINOPHEN 500 MG/5ML
650 LIQUID (ML) ORAL EVERY 6 HOURS
Refills: 0 | Status: DISCONTINUED | OUTPATIENT
Start: 2025-05-05 | End: 2025-05-06

## 2025-05-05 RX ORDER — CEFAZOLIN SODIUM IN 0.9 % NACL 3 G/100 ML
2000 INTRAVENOUS SOLUTION, PIGGYBACK (ML) INTRAVENOUS EVERY 8 HOURS
Refills: 0 | Status: COMPLETED | OUTPATIENT
Start: 2025-05-05 | End: 2025-05-06

## 2025-05-05 RX ORDER — POLYETHYLENE GLYCOL 3350 17 G/17G
17 POWDER, FOR SOLUTION ORAL AT BEDTIME
Refills: 0 | Status: DISCONTINUED | OUTPATIENT
Start: 2025-05-05 | End: 2025-05-06

## 2025-05-05 RX ORDER — APREPITANT 40 MG/1
40 CAPSULE ORAL ONCE
Refills: 0 | Status: COMPLETED | OUTPATIENT
Start: 2025-05-05 | End: 2025-05-05

## 2025-05-05 RX ORDER — B1/B2/B3/B5/B6/B12/VIT C/FOLIC 500-0.5 MG
1 TABLET ORAL DAILY
Refills: 0 | Status: DISCONTINUED | OUTPATIENT
Start: 2025-05-05 | End: 2025-05-06

## 2025-05-05 RX ORDER — CELECOXIB 50 MG/1
200 CAPSULE ORAL EVERY 12 HOURS
Refills: 0 | Status: DISCONTINUED | OUTPATIENT
Start: 2025-05-06 | End: 2025-05-06

## 2025-05-05 RX ORDER — SENNA 187 MG
2 TABLET ORAL AT BEDTIME
Refills: 0 | Status: DISCONTINUED | OUTPATIENT
Start: 2025-05-05 | End: 2025-05-06

## 2025-05-05 RX ORDER — KETOROLAC TROMETHAMINE 30 MG/ML
15 INJECTION, SOLUTION INTRAMUSCULAR; INTRAVENOUS EVERY 6 HOURS
Refills: 0 | Status: DISCONTINUED | OUTPATIENT
Start: 2025-05-05 | End: 2025-05-06

## 2025-05-05 RX ORDER — CELECOXIB 50 MG/1
200 CAPSULE ORAL ONCE
Refills: 0 | Status: COMPLETED | OUTPATIENT
Start: 2025-05-05 | End: 2025-05-05

## 2025-05-05 RX ORDER — HYDROMORPHONE/SOD CHLOR,ISO/PF 2 MG/10 ML
0.5 SYRINGE (ML) INJECTION ONCE
Refills: 0 | Status: DISCONTINUED | OUTPATIENT
Start: 2025-05-05 | End: 2025-05-06

## 2025-05-05 RX ADMIN — Medication 100 MILLIGRAM(S): at 18:12

## 2025-05-05 RX ADMIN — POLYETHYLENE GLYCOL 3350 17 GRAM(S): 17 POWDER, FOR SOLUTION ORAL at 21:38

## 2025-05-05 RX ADMIN — CELECOXIB 200 MILLIGRAM(S): 50 CAPSULE ORAL at 08:14

## 2025-05-05 RX ADMIN — SODIUM CHLORIDE 100 MILLILITER(S): 9 INJECTION, SOLUTION INTRAVENOUS at 12:55

## 2025-05-05 RX ADMIN — Medication 650 MILLIGRAM(S): at 18:12

## 2025-05-05 RX ADMIN — KETOROLAC TROMETHAMINE 15 MILLIGRAM(S): 30 INJECTION, SOLUTION INTRAMUSCULAR; INTRAVENOUS at 18:12

## 2025-05-05 RX ADMIN — APREPITANT 40 MILLIGRAM(S): 40 CAPSULE ORAL at 08:13

## 2025-05-05 RX ADMIN — Medication 2 TABLET(S): at 21:38

## 2025-05-05 RX ADMIN — Medication 650 MILLIGRAM(S): at 23:43

## 2025-05-05 RX ADMIN — Medication 1000 MILLIGRAM(S): at 08:13

## 2025-05-05 RX ADMIN — KETOROLAC TROMETHAMINE 15 MILLIGRAM(S): 30 INJECTION, SOLUTION INTRAMUSCULAR; INTRAVENOUS at 23:43

## 2025-05-05 NOTE — DISCHARGE NOTE PROVIDER - NSDCFUADDINST_GEN_ALL_CORE_FT
Please follow Dr. Sanderson's post-operative instruction sheet as your primary source of information for post-operative instructions. It will be provided to you at discharge.    ACTIVITY:   - Weight bear as tolerated with assistive device. No strenuous activity, heavy lifting, driving or returning to work until cleared by MD.   - Apply a cold compress to the surgical site several times daily to reduce pain and swelling. For icing, twenty-minute sessions followed by an hour off is recommended. You should ice as frequently as possible. Ice should NEVER be placed directly on the skin. Wearing compression stockings during the first week after surgery can help reduce swelling in your knee, calf and foot, but is not required.      (KNEE REPLACEMENTS)   DO place a pillow under your heel when elevating the leg, to encourage full extension of the knee. Do NOT place a pillow behind your knee for comfort, as this can lead to permanent difficulty straightening your leg. It is normal to develop some swelling in the leg, ankle, and foot because of gravity.    DRESSING/SHOWERING:   (SYLKE - mesh dressing)  - The first dressing over your incision is called Sylke. It is a mesh-like dressing that you may shower with on the evening of post op day 1, pat dry afterwards. Dressing is water-resistant. Do not soak in bathtubs. Do not need to cover with another dressing. Do not remove mesh dressing - it will be taken care of at your follow up appointment. If the dressing falls off before your post-op appointment, please call the office for further guidance. Do not apply ointments, creams, or oils to incision area.    (AQUACEL – brown gel dressing)   - This dressing has been placed on top of the Sylke dressing to reinforce the dressing. You may shower, your dressing is water-resistant. Do not soak in bathtubs. Remove dressing after postop day 7, then leave incision open to air, but leave the Sylke dressing below it in place. You may do this yourself (simply peel it off), or you may ask for assistance from your visiting nurse. Keep your incision clean and dry. Do not pick at your incision. Do not apply creams, ointments or oils to your incision until cleared by your surgeon. Do not soak your incision in sitting water (ie tubs, pools, lakes, etc.) until cleared by your surgeon. Do not scrub the incision – instead, allow soap and water to flow over the incision and then pat it dry with a clean towel.     MEDICATION/ANTICOAGULATION:   -You have been prescribed Aspirin 81 mg twice a day as a preventative to help prevent postoperative blood clots. Please take this medication as prescribed.    - You have been prescribed medications for pain:     - Tylenol for mild to moderate pain. Do not exceed 3,000mg daily.     - For more severe pain, take Tylenol with the addition of narcotic pain medication. Take this medication as prescribed. This medication may cause drowsiness or dizziness. Do not operate machinery. This medication may cause constipation.   - For any additional medications, follow instructions on the bottle.    -Try to have regular bowel movements. Take stool softener or laxative if necessary. You may wish to take Miralax daily until you have regular bowel movements.    - If you have been prescribed Aspirin or an anti-inflammatory, please take omeprazole (or similar) once a day, before breakfast, until no longer taking Aspirin or anti-inflammatory. This will help protect your stomach.   - If you have a pain management physician, please follow-up with them postoperatively.    - If you experience any negative side effects of your medications, please call your surgeon's office to discuss.      FOLLOW-UP:   - Call to schedule an appt with Dr. Sanderson for follow up.   - Please follow-up with your primary care physician or any other specialist you see postoperatively, if needed.    - Contact your doctor or go to the emergency room if you experience: fever greater than 101.5, chills, chest pain, difficulty breathing, redness or excessive drainage around the incision, other concerns.     Please follow Dr. Sanderson's post-operative instruction sheet as your primary source of information for post-operative instructions. It will be provided to you at discharge.

## 2025-05-05 NOTE — DISCHARGE NOTE NURSING/CASE MANAGEMENT/SOCIAL WORK - PATIENT PORTAL LINK FT
You can access the FollowMyHealth Patient Portal offered by [facility] by registering at the following website: [email or web address]. By joining [facility]’s FollowMyHealth portal, you will also be able to view your health information using other applications (apps) compatible with our system.

## 2025-05-05 NOTE — DISCHARGE NOTE PROVIDER - NSDCMRMEDTOKEN_GEN_ALL_CORE_FT
acetaminophen 325 mg oral tablet: orally every 8 hours Take 3 tabs every 8h for 4-6 weeks  aspirin 81 mg oral tablet, chewable: 1 tab(s) orally 2 times a day until 4 weeks after surgery  celecoxib 200 mg oral capsule: 1 cap(s) orally every 12 hours for 4-6 weeks  oxyCODONE 5 mg oral tablet: 1 tab(s) orally every 6 hours as needed for Moderate Pain (4 - 6) Take 1-2 tabs every 6h as needed for moderate to severe pain  pantoprazole 40 mg oral delayed release tablet: 1 tab(s) orally once a day (before a meal)   acetaminophen 325 mg oral tablet: orally every 8 hours Take 3 tabs every 8h for 4-6 weeks  amLODIPine 2.5 mg oral tablet: 1 tab(s) orally once a day  aspirin 81 mg oral tablet, chewable: 1 tab(s) orally 2 times a day until 4 weeks after surgery  celecoxib 200 mg oral capsule: 1 cap(s) orally every 12 hours for 4-6 weeks  metoprolol succinate 25 mg oral tablet, extended release: 1 tab(s) orally once a day  oxyCODONE 5 mg oral tablet: 1 tab(s) orally every 6 hours as needed for Moderate Pain (4 - 6) Take 1-2 tabs every 6h as needed for moderate to severe pain  pantoprazole 40 mg oral delayed release tablet: 1 tab(s) orally once a day (before a meal)   acetaminophen 500 mg oral tablet: 2 tab(s) orally every 8 hours  aspirin 81 mg oral tablet, chewable: 1 tab(s) orally 2 times a day until 4 weeks after surgery  celecoxib 200 mg oral capsule: 1 cap(s) orally every 12 hours for 4-6 weeks  oxyCODONE 5 mg oral tablet: 1 tab(s) orally every 6 hours as needed for Moderate Pain (4 - 6) Take 1-2 tabs every 6h as needed for moderate to severe pain  pantoprazole 40 mg oral delayed release tablet: 1 tab(s) orally once a day (before a meal)

## 2025-05-05 NOTE — PHYSICAL THERAPY INITIAL EVALUATION ADULT - ADDITIONAL COMMENTS
Pt lives with spouse in an apt, +elevator. Prior to admission, pt ambulated independently with cane. Pt has a rolling walker. Pt has a bath tub, grab bar, shower chair. Pt has an aide 6 hours x 6 days/week.

## 2025-05-05 NOTE — PHYSICAL THERAPY INITIAL EVALUATION ADULT - MANUAL MUSCLE TESTING RESULTS, REHAB EVAL
b/l UEs >3+/5 t/o, b/l LEs ~4+/5 t/o except R ankle DF~3-/5(Pt stated that this is her chronic condition)

## 2025-05-05 NOTE — PHYSICAL THERAPY INITIAL EVALUATION ADULT - TRANSFER SKILLS, REHAB EVAL
Writer has received call from tele tech about pt has 6 beats of V tach on tele monitoring. Pt asymptomatic lying in bed. Cardiac NP on call notified and said AM labs drawn so no need to release K and Mag. Will continue to monitor.    independent

## 2025-05-05 NOTE — DISCHARGE NOTE NURSING/CASE MANAGEMENT/SOCIAL WORK - FINANCIAL ASSISTANCE
[facility] provides services at a reduced cost to those who are determined to be eligible through [facility]’s financial assistance program. Information regarding [facility]’s financial assistance program can be found by going to [email or web address] or by calling [phone].

## 2025-05-05 NOTE — PHYSICAL THERAPY INITIAL EVALUATION ADULT - GENERAL OBSERVATIONS, REHAB EVAL
Pt received semi supine in bed with +hep-lock, +SCDs, NAD, daughter present. Pt left as found, NAD, call bell in reach, daughter present, RN Rosa made aware.

## 2025-05-05 NOTE — DISCHARGE NOTE PROVIDER - NSDCFUSCHEDAPPT_GEN_ALL_CORE_FT
Gurjit Sanderson  [facility] Physician [facility]  ORTHOSURG [address]  Scheduled Appointment: 05/20/2025

## 2025-05-05 NOTE — DISCHARGE NOTE PROVIDER - NSDCCPTREATMENT_GEN_ALL_CORE_FT
PRINCIPAL PROCEDURE  Procedure: Total knee replacement  Findings and Treatment: Osteoarthritis of right knee

## 2025-05-05 NOTE — DISCHARGE NOTE PROVIDER - HOSPITAL COURSE
Admitted 5/5/25  Surgery right total knee replacement  Pain control  Perioperative Antibiotics  DVT prophylaxis  Optimized Physical Therapy and out of bed to chair  Pain Regimen given as needed  Medicine Comanagement    You were admitted to the orthopedic service, optimized by medicine for discharge. Labs and vitals were monitored daily. You worked with physical therapy after surgery daily.

## 2025-05-05 NOTE — DISCHARGE NOTE PROVIDER - CARE PROVIDER_API CALL
Gurjit Sanderson  Joint Reconstruction  [address]  [place] [address]  Phone: [phone]  Fax: [phone]  Follow Up Time: 2 weeks

## 2025-05-05 NOTE — PHYSICAL THERAPY INITIAL EVALUATION ADULT - GAIT DEVIATIONS NOTED, PT EVAL
fairly steady gait, no lose of balance, decreased heel strike and knee flexion on RLE./decreased cintia/increased time in double stance/decreased step length

## 2025-05-06 VITALS
TEMPERATURE: 98 F | SYSTOLIC BLOOD PRESSURE: 116 MMHG | RESPIRATION RATE: 16 BRPM | HEART RATE: 56 BPM | DIASTOLIC BLOOD PRESSURE: 61 MMHG | OXYGEN SATURATION: 97 %

## 2025-05-06 LAB
ANION GAP SERPL CALC-SCNC: 17 MMOL/L — SIGNIFICANT CHANGE UP (ref 5–17)
BUN SERPL-MCNC: 15 MG/DL — SIGNIFICANT CHANGE UP (ref 7–23)
CALCIUM SERPL-MCNC: 8.6 MG/DL — SIGNIFICANT CHANGE UP (ref 8.4–10.5)
CHLORIDE SERPL-SCNC: 107 MMOL/L — SIGNIFICANT CHANGE UP (ref 96–108)
CO2 SERPL-SCNC: 13 MMOL/L — LOW (ref 22–31)
CREAT SERPL-MCNC: 0.62 MG/DL — SIGNIFICANT CHANGE UP (ref 0.5–1.3)
EGFR: 94 ML/MIN/1.73M2 — SIGNIFICANT CHANGE UP
EGFR: 94 ML/MIN/1.73M2 — SIGNIFICANT CHANGE UP
GLUCOSE SERPL-MCNC: 129 MG/DL — HIGH (ref 70–99)
HCT VFR BLD CALC: 47 % — HIGH (ref 34.5–45)
HGB BLD-MCNC: 14.4 G/DL — SIGNIFICANT CHANGE UP (ref 11.5–15.5)
MCHC RBC-ENTMCNC: 30.6 G/DL — LOW (ref 32–36)
MCHC RBC-ENTMCNC: 32.1 PG — SIGNIFICANT CHANGE UP (ref 27–34)
MCV RBC AUTO: 104.9 FL — HIGH (ref 80–100)
NRBC BLD AUTO-RTO: 0 /100 WBCS — SIGNIFICANT CHANGE UP (ref 0–0)
PLATELET # BLD AUTO: 207 K/UL — SIGNIFICANT CHANGE UP (ref 150–400)
POTASSIUM SERPL-MCNC: 4.6 MMOL/L — SIGNIFICANT CHANGE UP (ref 3.5–5.3)
POTASSIUM SERPL-SCNC: 4.6 MMOL/L — SIGNIFICANT CHANGE UP (ref 3.5–5.3)
RBC # BLD: 4.48 M/UL — SIGNIFICANT CHANGE UP (ref 3.8–5.2)
RBC # FLD: 12.3 % — SIGNIFICANT CHANGE UP (ref 10.3–14.5)
SODIUM SERPL-SCNC: 137 MMOL/L — SIGNIFICANT CHANGE UP (ref 135–145)
WBC # BLD: 15.48 K/UL — HIGH (ref 3.8–10.5)
WBC # FLD AUTO: 15.48 K/UL — HIGH (ref 3.8–10.5)

## 2025-05-06 PROCEDURE — 99222 1ST HOSP IP/OBS MODERATE 55: CPT

## 2025-05-06 PROCEDURE — 97162 PT EVAL MOD COMPLEX 30 MIN: CPT

## 2025-05-06 PROCEDURE — C1776: CPT

## 2025-05-06 PROCEDURE — C1713: CPT

## 2025-05-06 PROCEDURE — 80048 BASIC METABOLIC PNL TOTAL CA: CPT

## 2025-05-06 PROCEDURE — 73560 X-RAY EXAM OF KNEE 1 OR 2: CPT

## 2025-05-06 PROCEDURE — 97116 GAIT TRAINING THERAPY: CPT

## 2025-05-06 PROCEDURE — 85027 COMPLETE CBC AUTOMATED: CPT

## 2025-05-06 RX ORDER — METOPROLOL SUCCINATE 50 MG/1
1 TABLET, EXTENDED RELEASE ORAL
Qty: 0 | Refills: 0 | DISCHARGE

## 2025-05-06 RX ORDER — ACETAMINOPHEN 500 MG/5ML
0 LIQUID (ML) ORAL
Qty: 0 | Refills: 0 | DISCHARGE
Start: 2025-05-06

## 2025-05-06 RX ORDER — ACETAMINOPHEN 500 MG/5ML
2 LIQUID (ML) ORAL
Qty: 0 | Refills: 0 | DISCHARGE

## 2025-05-06 RX ORDER — ASPIRIN 325 MG
1 TABLET ORAL
Qty: 0 | Refills: 0 | DISCHARGE
Start: 2025-05-06

## 2025-05-06 RX ORDER — OXYCODONE HYDROCHLORIDE 30 MG/1
1 TABLET ORAL
Qty: 0 | Refills: 0 | DISCHARGE
Start: 2025-05-06

## 2025-05-06 RX ORDER — CELECOXIB 50 MG/1
1 CAPSULE ORAL
Qty: 0 | Refills: 0 | DISCHARGE
Start: 2025-05-06

## 2025-05-06 RX ORDER — CELECOXIB 50 MG/1
200 CAPSULE ORAL EVERY 12 HOURS
Refills: 0 | Status: DISCONTINUED | OUTPATIENT
Start: 2025-05-06 | End: 2025-05-06

## 2025-05-06 RX ORDER — AMLODIPINE BESYLATE 10 MG/1
1 TABLET ORAL
Qty: 0 | Refills: 0 | DISCHARGE

## 2025-05-06 RX ORDER — METOPROLOL SUCCINATE 50 MG/1
1 TABLET, EXTENDED RELEASE ORAL
Qty: 1 | Refills: 0
Start: 2025-05-06

## 2025-05-06 RX ADMIN — Medication 1 TABLET(S): at 11:13

## 2025-05-06 RX ADMIN — Medication 100 MILLIGRAM(S): at 02:27

## 2025-05-06 RX ADMIN — CELECOXIB 200 MILLIGRAM(S): 50 CAPSULE ORAL at 05:57

## 2025-05-06 RX ADMIN — Medication 81 MILLIGRAM(S): at 05:55

## 2025-05-06 RX ADMIN — KETOROLAC TROMETHAMINE 15 MILLIGRAM(S): 30 INJECTION, SOLUTION INTRAMUSCULAR; INTRAVENOUS at 11:13

## 2025-05-06 RX ADMIN — Medication 40 MILLIGRAM(S): at 05:55

## 2025-05-06 RX ADMIN — KETOROLAC TROMETHAMINE 15 MILLIGRAM(S): 30 INJECTION, SOLUTION INTRAMUSCULAR; INTRAVENOUS at 06:58

## 2025-05-06 RX ADMIN — Medication 650 MILLIGRAM(S): at 05:55

## 2025-05-06 RX ADMIN — Medication 650 MILLIGRAM(S): at 11:13

## 2025-05-06 NOTE — CONSULT NOTE ADULT - ASSESSMENT
74F Cantonese PMHx of HLD, PVD, Osteoporosis on Prolia q 6 months, DJD s/p L TKR ( 2/2024), hs abn stress test and underwent LHC ( 4/2) w/ normal coronaries. Pt now underwent R TKR ( 5/5) POD#1. no complaints. PT eval rec: home PT. d/c home today as d/w PA and Cardiologist Dr. Elias Michaels- no need to continue Toprol Xl 25mg and Amlodipine 2.5mg daily.     Medically cleared for d/c home today as d/w Ortho PA.   Spoke w. daughter Lvy at bedside and Cardiologist Dr. Elias Michaels  f/u PMD Dr. Prasad Sprague and Ortho Dr. Gurjit Sanderson

## 2025-05-06 NOTE — CONSULT NOTE ADULT - SUBJECTIVE AND OBJECTIVE BOX
HPI:  74F [place] PMHx of HLD, PVD, Osteoporosis on Prolia q 6 months, DJD s/p L TKR ( 2/2024), hs abn stress test and underwent LHC ( 4/2) w/ normal coronaries. Pt now underwent R TKR ( 5/5) POD#1. no complaints. PT eval rec: home PT. d/c home today as d/w PA and Cardiologist Dr. Elias Michaels- no need to continue Toprol Xl 25mg and Amlodipine 2.5mg daily.     Has the patient used any narcotic more than 90 days prior to the date of surgery? NO     Present for elective right total knee replacement  (02 May 2025 10:56)      PAST MEDICAL & SURGICAL HISTORY:  Osteoarthritis of left knee      PVD (peripheral vascular disease)      HLD (hyperlipidemia)      HTN (hypertension)      Lung nodule      H/O osteoporosis      H/O: glaucoma      H/O myomectomy      H/O: hysterectomy      H/O eye surgery  LEFT CATARACT- LEFT EYE BLURRY      History of left knee replacement        Home Medications:  acetaminophen 500 mg oral tablet: 2 tab(s) orally every 8 hours (06 May 2025 11:56)  aspirin 81 mg oral tablet, chewable: 1 tab(s) orally 2 times a day until 4 weeks after surgery (06 May 2025 11:30)  celecoxib 200 mg oral capsule: 1 cap(s) orally every 12 hours for 4-6 weeks (06 May 2025 11:30)  oxyCODONE 5 mg oral tablet: 1 tab(s) orally every 6 hours as needed for Moderate Pain (4 - 6) Take 1-2 tabs every 6h as needed for moderate to severe pain (06 May 2025 11:30)  pantoprazole 40 mg oral delayed release tablet: 1 tab(s) orally once a day (before a meal) (06 May 2025 11:30)    Allergies    No Known Allergies    Intolerances      FAMILY HISTORY:    Social History:  No smoking history, recreational drug use, etoh use (02 May 2025 10:56)      REVIEW OF SYSTEMS:  CONSTITUTIONAL: No fever, weight loss  EYES: No eye pain, or discharge  ENMT:  No tinnitus, vertigo  NECK: No pain or stiffness  RESPIRATORY: No cough, No dyspnea  CARDIOVASCULAR: No chest pain, or leg swelling  GASTROINTESTINAL: No abdominal pain. No diarrhea ;No melena or hematochezia.  GENITOURINARY: No dysuria, frequency, or hematuria  NEUROLOGICAL: No numbness, or tremors  SKIN: No itching, burning, rashes, or lesions   ENDOCRINE: No heat or cold intolerance;  MUSCULOSKELETAL: No joint pain or swelling;   PSYCHIATRIC: No mood swings, or difficulty sleeping  HEME/LYMPH: No easy bruising, or bleeding gums  ALLERGY AND IMMUNOLOGIC: No hives or eczema    Diet, Regular (05-05-25 @ 07:06) [Active]      CURRENT MEDICATIONS:   acetaminophen     Tablet .. 650 milliGRAM(s) Oral every 6 hours  aspirin  chewable 81 milliGRAM(s) Oral two times a day  celecoxib 200 milliGRAM(s) Oral every 12 hours  chlorhexidine 2% Cloths 1 Application(s) Topical every 12 hours  HYDROmorphone  Injectable 0.5 milliGRAM(s) IV Push once  HYDROmorphone  Injectable 0.5 milliGRAM(s) IV Push every 3 hours PRN  lactated ringers. 1000 milliLiter(s) IV Continuous <Continuous>  magnesium hydroxide Suspension 30 milliLiter(s) Oral daily PRN  multivitamin 1 Tablet(s) Oral daily  ondansetron Injectable 4 milliGRAM(s) IV Push every 6 hours PRN  oxyCODONE    IR 5 milliGRAM(s) Oral every 3 hours PRN  oxyCODONE    IR 10 milliGRAM(s) Oral every 3 hours PRN  pantoprazole    Tablet 40 milliGRAM(s) Oral before breakfast  polyethylene glycol 3350 17 Gram(s) Oral at bedtime  povidone iodine 10% Nasal Swab 1 Application(s) Both Nostrils once  senna 2 Tablet(s) Oral at bedtime  Tranexamic acid 1g injection 1 Dose(s) 1 Dose(s) Local Injection once      VITAL SIGNS, INS/OUTS (last 24 hours):  Vital Signs Last 24 Hrs  T(C): 36.5 (06 May 2025 08:58), Max: 36.5 (06 May 2025 00:16)  T(F): 97.7 (06 May 2025 08:58), Max: 97.7 (06 May 2025 00:16)  HR: 56 (06 May 2025 08:58) (56 - 61)  BP: 116/61 (06 May 2025 08:58) (105/68 - 125/74)  BP(mean): --  RR: 16 (06 May 2025 08:58) (16 - 18)  SpO2: 97% (06 May 2025 08:58) (95% - 99%)    Parameters below as of 06 May 2025 08:58  Patient On (Oxygen Delivery Method): room air      I&O's Summary    05 May 2025 07:01  -  06 May 2025 07:00  --------------------------------------------------------  IN: 1230 mL / OUT: 200 mL / NET: 1030 mL        PHYSICAL EXAM:  Gen: Reclining in bed at time of exam, appears stated age  HEENT: NCAT, MMM, clear OP  Neck: supple, trachea at midline  CV: RRR, +S1/S2  Pulm: adequate respiratory effort, no increase in work of breathing  Abd: soft, NTND  Skin: warm and dry,  Ext: WWP, no LE edema  Neuro: AOx3, no gross focal neurological deficits  Psych: affect and behavior appropriate, pleasant at time of interview    BASIC LABS:                        14.4   15.48 )-----------( 207      ( 06 May 2025 05:30 )             47.0     05-06    137  |  107  |  15  ----------------------------<  129[H]  4.6   |  13[L]  |  0.62    Ca    8.6      06 May 2025 05:30        Urinalysis Basic - ( 06 May 2025 05:30 )    Color: x / Appearance: x / SG: x / pH: x  Gluc: 129 mg/dL / Ketone: x  / Bili: x / Urobili: x   Blood: x / Protein: x / Nitrite: x   Leuk Esterase: x / RBC: x / WBC x   Sq Epi: x / Non Sq Epi: x / Bacteria: x      CAPILLARY BLOOD GLUCOSE          OTHER LABS:        MICRODATA:      IMAGING:    EKG:    #Diet - Diet, Regular (05-05-25 @ 07:06) [Active]        #DVT PPx -

## 2025-05-06 NOTE — PROGRESS NOTE ADULT - SUBJECTIVE AND OBJECTIVE BOX
Ortho Post Op Check    Procedure: right total knee replacement  Surgeon: Dr. Sanderson    Pt comfortable without complaints, pain controlled  Denies CP, SOB, N/V, numbness/tingling     Vital Signs Last 24 Hrs  T(C): 36.4 (05-05-25 @ 14:38), Max: 36.4 (05-05-25 @ 11:58)  T(F): 97.5 (05-05-25 @ 14:38), Max: 97.6 (05-05-25 @ 11:58)  HR: 57 (05-05-25 @ 14:38) (56 - 70)  BP: 125/74 (05-05-25 @ 14:38) (92/50 - 125/74)  BP(mean): 84 (05-05-25 @ 13:58) (66 - 84)  RR: 17 (05-05-25 @ 14:38) (14 - 18)  SpO2: 95% (05-05-25 @ 14:38) (95% - 98%)  I&O's Summary    05 May 2025 07:01  -  05 May 2025 15:13  --------------------------------------------------------  IN: 320 mL / OUT: 50 mL / NET: 270 mL        General: Pt Alert and oriented, NAD  DSG C/D/I - Aquacel, Sylke  Pulses: 2+ DP b/l  Sensation: SILT L3-S1 b/l  Motor: EHL/FHL/TA/GS 5/5 b/l            A/P: 74yFemale POD#0 s/p right total knee replacement  - Stable  - Pain Control  - DVT ppx: SCDs, Aspirin  - Post op abx: Ancef  - PT, WBS: WBAT  - Dispo: pending PT eval    Ortho Pager [phone]
Ortho Progress Note     Procedure: right total knee replacement  Surgeon: Dr. Sanderson    Pt comfortable without complaints, pain controlled  Denies CP, SOB, N/V, numbness/tingling     Vital Signs Last 24 Hrs  T(C): 36.4 (05-05-25 @ 14:38), Max: 36.4 (05-05-25 @ 11:58)  T(F): 97.5 (05-05-25 @ 14:38), Max: 97.6 (05-05-25 @ 11:58)  HR: 57 (05-05-25 @ 14:38) (56 - 70)  BP: 125/74 (05-05-25 @ 14:38) (92/50 - 125/74)  BP(mean): 84 (05-05-25 @ 13:58) (66 - 84)  RR: 17 (05-05-25 @ 14:38) (14 - 18)  SpO2: 95% (05-05-25 @ 14:38) (95% - 98%)  I&O's Summary    05 May 2025 07:01  -  05 May 2025 15:13  --------------------------------------------------------  IN: 320 mL / OUT: 50 mL / NET: 270 mL        General: Pt Alert and oriented, NAD  DSG C/D/I - Aquacel, Sylke  Pulses: 2+ DP b/l  Sensation: SILT L3-S1 b/l  Motor: EHL/FHL/TA/GS 5/5 b/l            A/P: 74yFemale POD#1 s/p right total knee replacement  - Stable  - Pain Control  - DVT ppx: SCDs, Aspirin  - Post op abx: Ancef  - PT, WBS: WBAT  - Dispo: HPT     Ortho Pager [phone]
POST OPERATIVE DAY #: 1  STATUS POST:     RIGHT TKR              SUBJECTIVE: Patient seen and examined with daughter at bedside. Pt. states she does not much pain at all. Eager to go home today, walked with PT. Denies any sob/cp/n/v/numbness or tingling in b/l les.     OBJECTIVE:     Vital Signs Last 24 Hrs  T(C): 36.5 (06 May 2025 08:58), Max: 36.5 (06 May 2025 00:16)  T(F): 97.7 (06 May 2025 08:58), Max: 97.7 (06 May 2025 00:16)  HR: 56 (06 May 2025 08:58) (56 - 61)  BP: 116/61 (06 May 2025 08:58) (105/68 - 116/75)  BP(mean): --  RR: 16 (06 May 2025 08:58) (16 - 18)  SpO2: 97% (06 May 2025 08:58) (97% - 99%)    Parameters below as of 06 May 2025 08:58  Patient On (Oxygen Delivery Method): room air        General: NAD   Affected extremity: RIGHT LE   Dressing: clean/dry/intact  aquacel in place   Sensation: intact to light touch to patient's baseline  Motor exam: EHL/GS 5/5, TA 4/5 (had preop)  Pulses 2+             I&O's Detail    05 May 2025 07:01  -  06 May 2025 07:00  --------------------------------------------------------  IN:    IV PiggyBack: 50 mL    Lactated Ringers: 700 mL    Oral Fluid: 480 mL  Total IN: 1230 mL    OUT:    Voided (mL): 200 mL  Total OUT: 200 mL    Total NET: 1030 mL          LABS:                        14.4   15.48 )-----------( 207      ( 06 May 2025 05:30 )             47.0     05-06    137  |  107  |  15  ----------------------------<  129[H]  4.6   |  13[L]  |  0.62    Ca    8.6      06 May 2025 05:30        Urinalysis Basic - ( 06 May 2025 05:30 )    Color: x / Appearance: x / SG: x / pH: x  Gluc: 129 mg/dL / Ketone: x  / Bili: x / Urobili: x   Blood: x / Protein: x / Nitrite: x   Leuk Esterase: x / RBC: x / WBC x   Sq Epi: x / Non Sq Epi: x / Bacteria: x        MEDICATIONS:    acetaminophen     Tablet .. 650 milliGRAM(s) Oral every 6 hours  celecoxib 200 milliGRAM(s) Oral every 12 hours  HYDROmorphone  Injectable 0.5 milliGRAM(s) IV Push once  HYDROmorphone  Injectable 0.5 milliGRAM(s) IV Push every 3 hours PRN  ondansetron Injectable 4 milliGRAM(s) IV Push every 6 hours PRN  oxyCODONE    IR 5 milliGRAM(s) Oral every 3 hours PRN  oxyCODONE    IR 10 milliGRAM(s) Oral every 3 hours PRN    aspirin  chewable 81 milliGRAM(s) Oral two times a day        ASSESSMENT AND PLAN: 73yo Female s/p RIGHT TKR     - Analgesic pain control  - DVT prophylaxis: ASA          SCDs       - Weight Bearing Status:  Weight bearing as tolerated        - Disposition: Home today, d/w Dr. Dawson medically stable for MD.

## 2025-05-10 DIAGNOSIS — Z90.710 ACQUIRED ABSENCE OF BOTH CERVIX AND UTERUS: ICD-10-CM

## 2025-05-10 DIAGNOSIS — Z79.82 LONG TERM (CURRENT) USE OF ASPIRIN: ICD-10-CM

## 2025-05-10 DIAGNOSIS — Z96.652 PRESENCE OF LEFT ARTIFICIAL KNEE JOINT: ICD-10-CM

## 2025-05-10 DIAGNOSIS — M17.11 UNILATERAL PRIMARY OSTEOARTHRITIS, RIGHT KNEE: ICD-10-CM

## 2025-05-10 DIAGNOSIS — E78.5 HYPERLIPIDEMIA, UNSPECIFIED: ICD-10-CM

## 2025-05-10 DIAGNOSIS — M81.0 AGE-RELATED OSTEOPOROSIS WITHOUT CURRENT PATHOLOGICAL FRACTURE: ICD-10-CM

## 2025-05-10 DIAGNOSIS — I10 ESSENTIAL (PRIMARY) HYPERTENSION: ICD-10-CM

## 2025-05-20 ENCOUNTER — APPOINTMENT (OUTPATIENT)
Dept: ORTHOPEDIC SURGERY | Facility: CLINIC | Age: 75
End: 2025-05-20
Payer: MEDICARE

## 2025-05-20 VITALS
DIASTOLIC BLOOD PRESSURE: 78 MMHG | OXYGEN SATURATION: 95 % | HEIGHT: 61 IN | SYSTOLIC BLOOD PRESSURE: 132 MMHG | WEIGHT: 125 LBS | BODY MASS INDEX: 23.6 KG/M2 | TEMPERATURE: 97.8 F | HEART RATE: 64 BPM

## 2025-05-20 DIAGNOSIS — Z47.1 AFTERCARE FOLLOWING JOINT REPLACEMENT SURGERY: ICD-10-CM

## 2025-05-20 DIAGNOSIS — Z96.653 AFTERCARE FOLLOWING JOINT REPLACEMENT SURGERY: ICD-10-CM

## 2025-05-20 PROBLEM — I10 ESSENTIAL (PRIMARY) HYPERTENSION: Chronic | Status: ACTIVE | Noted: 2025-05-02

## 2025-05-20 PROBLEM — R91.1 SOLITARY PULMONARY NODULE: Chronic | Status: ACTIVE | Noted: 2025-05-02

## 2025-05-20 PROCEDURE — 99024 POSTOP FOLLOW-UP VISIT: CPT

## 2025-06-17 ENCOUNTER — OUTPATIENT (OUTPATIENT)
Dept: OUTPATIENT SERVICES | Facility: HOSPITAL | Age: 75
LOS: 1 days | End: 2025-06-17
Payer: MEDICARE

## 2025-06-17 ENCOUNTER — APPOINTMENT (OUTPATIENT)
Dept: ORTHOPEDIC SURGERY | Facility: CLINIC | Age: 75
End: 2025-06-17
Payer: MEDICARE

## 2025-06-17 ENCOUNTER — RESULT REVIEW (OUTPATIENT)
Age: 75
End: 2025-06-17

## 2025-06-17 VITALS
BODY MASS INDEX: 23.6 KG/M2 | HEART RATE: 77 BPM | OXYGEN SATURATION: 98 % | WEIGHT: 125 LBS | SYSTOLIC BLOOD PRESSURE: 94 MMHG | DIASTOLIC BLOOD PRESSURE: 67 MMHG | TEMPERATURE: 97.9 F | HEIGHT: 61 IN

## 2025-06-17 DIAGNOSIS — Z96.652 PRESENCE OF LEFT ARTIFICIAL KNEE JOINT: Chronic | ICD-10-CM

## 2025-06-17 DIAGNOSIS — Z98.890 OTHER SPECIFIED POSTPROCEDURAL STATES: Chronic | ICD-10-CM

## 2025-06-17 DIAGNOSIS — Z90.710 ACQUIRED ABSENCE OF BOTH CERVIX AND UTERUS: Chronic | ICD-10-CM

## 2025-06-17 PROBLEM — Z87.39 PERSONAL HISTORY OF OTHER DISEASES OF THE MUSCULOSKELETAL SYSTEM AND CONNECTIVE TISSUE: Chronic | Status: ACTIVE | Noted: 2025-05-02

## 2025-06-17 PROBLEM — Z86.69 PERSONAL HISTORY OF OTHER DISEASES OF THE NERVOUS SYSTEM AND SENSE ORGANS: Chronic | Status: ACTIVE | Noted: 2025-05-02

## 2025-06-17 PROCEDURE — 73562 X-RAY EXAM OF KNEE 3: CPT | Mod: 26,RT

## 2025-06-17 PROCEDURE — 73562 X-RAY EXAM OF KNEE 3: CPT

## 2025-06-17 PROCEDURE — 99024 POSTOP FOLLOW-UP VISIT: CPT

## 2025-09-03 ENCOUNTER — OUTPATIENT (OUTPATIENT)
Dept: OUTPATIENT SERVICES | Facility: HOSPITAL | Age: 75
LOS: 1 days | End: 2025-09-03
Payer: MEDICARE

## 2025-09-03 ENCOUNTER — APPOINTMENT (OUTPATIENT)
Dept: ORTHOPEDIC SURGERY | Facility: CLINIC | Age: 75
End: 2025-09-03
Payer: MEDICARE

## 2025-09-03 ENCOUNTER — RESULT REVIEW (OUTPATIENT)
Age: 75
End: 2025-09-03

## 2025-09-03 DIAGNOSIS — Z96.652 PRESENCE OF LEFT ARTIFICIAL KNEE JOINT: Chronic | ICD-10-CM

## 2025-09-03 DIAGNOSIS — Z98.890 OTHER SPECIFIED POSTPROCEDURAL STATES: Chronic | ICD-10-CM

## 2025-09-03 DIAGNOSIS — Z47.1 AFTERCARE FOLLOWING JOINT REPLACEMENT SURGERY: ICD-10-CM

## 2025-09-03 DIAGNOSIS — M54.16 RADICULOPATHY, LUMBAR REGION: ICD-10-CM

## 2025-09-03 DIAGNOSIS — M76.31 ILIOTIBIAL BAND SYNDROME, RIGHT LEG: ICD-10-CM

## 2025-09-03 DIAGNOSIS — Z90.710 ACQUIRED ABSENCE OF BOTH CERVIX AND UTERUS: Chronic | ICD-10-CM

## 2025-09-03 DIAGNOSIS — Z96.653 AFTERCARE FOLLOWING JOINT REPLACEMENT SURGERY: ICD-10-CM

## 2025-09-03 PROCEDURE — 99214 OFFICE O/P EST MOD 30 MIN: CPT

## 2025-09-03 PROCEDURE — 73564 X-RAY EXAM KNEE 4 OR MORE: CPT

## 2025-09-03 PROCEDURE — 73564 X-RAY EXAM KNEE 4 OR MORE: CPT | Mod: 26,RT

## 2025-09-03 RX ORDER — METHYLPREDNISOLONE 4 MG/1
4 TABLET ORAL
Qty: 1 | Refills: 0 | Status: ACTIVE | COMMUNITY
Start: 2025-09-03 | End: 1900-01-01

## 2025-09-03 RX ORDER — PREGABALIN 50 MG/1
50 CAPSULE ORAL TWICE DAILY
Qty: 60 | Refills: 2 | Status: ACTIVE | COMMUNITY
Start: 2025-09-03 | End: 1900-01-01

## (undated) DEVICE — ORTHOALIGN PLUS UNIT

## (undated) DEVICE — PACK TOTAL KNEE

## (undated) DEVICE — WOUND IRR SURGIPHOR

## (undated) DEVICE — SAW BLADE STRYKER SAGITTAL DUAL CUT TEETH 35X64X.64MM

## (undated) DEVICE — SPECIMEN CONTAINER 4OZ

## (undated) DEVICE — PREP DURAPREP 26CC

## (undated) DEVICE — SUT STRATAFIX SYMMETRIC PDS 1 45CM OS-6

## (undated) DEVICE — VENODYNE/SCD SLEEVE CALF MEDIUM

## (undated) DEVICE — DRSG AQUACEL 3.5 X 10"

## (undated) DEVICE — ROSA NAVITRACKER KIT KNEE/SPINE

## (undated) DEVICE — SUT STRATAFIX SPIRAL MONOCRYL PLUS 3-0 30CM PS-1 UNDYED

## (undated) DEVICE — GLV 7.5 PROTEXIS ORTHO (CREAM)

## (undated) DEVICE — SUT VICRYL 2-0 27" CT-1 UNDYED

## (undated) DEVICE — PREP CHLORAPREP HI-LITE ORANGE 26ML

## (undated) DEVICE — DRAPE 1/2 SHEET 40X57"

## (undated) DEVICE — ELCTR STRYKER NEPTUNE SMOKE EVACUATION PENCIL (GREEN)

## (undated) DEVICE — SUT STRATAFIX SPIRAL PDO 1 30CM OS-6

## (undated) DEVICE — DRSG COBAN 6"

## (undated) DEVICE — SUT VICRYL 0 36" CT-1 UNDYED

## (undated) DEVICE — GLV 8 PROTEXIS (WHITE)

## (undated) DEVICE — WOUND IRR IRRISEPT W 0.5 CHG

## (undated) DEVICE — ROSA DRAPE ROBOTIC UNIT

## (undated) DEVICE — WARMING BLANKET UPPER ADULT

## (undated) DEVICE — SUT ETHILON 3-0 18" PS-1

## (undated) DEVICE — DRAPE 3/4 SHEET 52X76"

## (undated) DEVICE — DRSG DERMABOND PRINEO 22CM

## (undated) DEVICE — POSITIONER FOAM EGG CRATE ULNAR 2PCS (PINK)

## (undated) DEVICE — SUT STRATAFIX SPIRAL PDS PLUS 1 30X30CM OS-6 VIOLET

## (undated) DEVICE — SAW BLADE STRYKER SAGITTAL 81.5X12.5X1.19MM

## (undated) DEVICE — ADHESIVE SKIN CLOSURE FIBROIN 2.5X32CM 10/BX

## (undated) DEVICE — SUT STRATAFIX SPIRAL PDO 0 24CM CP-2

## (undated) DEVICE — SAW BLADE STRYKER SAGITTAL 25X86.5X1.32MM

## (undated) DEVICE — GLV 7 PROTEXIS ORTHO (CREAM)

## (undated) DEVICE — POLISHER OR CAUTERY TIP

## (undated) DEVICE — SUT STRATAFIX SPIRAL PDS PLUS 0 23X23CM CP-2 VIOLET

## (undated) DEVICE — POSITIONER STRYKER LEG

## (undated) DEVICE — NDL HYPO SAFE 22G X 1.5" (BLACK)

## (undated) DEVICE — ELCTR AQUAMANTYS BIPOLAR SEALER 6.0

## (undated) DEVICE — STRYKER 4-PORT MANIFOLD W/SPECIMEN COLLECTION

## (undated) DEVICE — DRAPE STERI-DRAPE INCISE 32X33"

## (undated) DEVICE — NEPTUNE 4-PORT MANIFOLD W/ SPECIMEN COLLECTION